# Patient Record
Sex: FEMALE | Race: BLACK OR AFRICAN AMERICAN | NOT HISPANIC OR LATINO | Employment: OTHER | URBAN - METROPOLITAN AREA
[De-identification: names, ages, dates, MRNs, and addresses within clinical notes are randomized per-mention and may not be internally consistent; named-entity substitution may affect disease eponyms.]

---

## 2017-01-11 ENCOUNTER — TELEPHONE (OUTPATIENT)
Dept: FAMILY MEDICINE | Facility: CLINIC | Age: 67
End: 2017-01-11

## 2017-01-11 NOTE — TELEPHONE ENCOUNTER
----- Message from Mya Cochran sent at 1/6/2017  4:04 PM CST -----  Contact: [phone] or [phone] /self   Patient called in returning your call. Please advise.

## 2017-01-19 ENCOUNTER — TELEPHONE (OUTPATIENT)
Dept: FAMILY MEDICINE | Facility: CLINIC | Age: 67
End: 2017-01-19

## 2017-01-19 NOTE — TELEPHONE ENCOUNTER
----- Message from Celine Osorio sent at 1/16/2017  9:10 AM CST -----  Patient no. [phone]    Patient wants to cancel the orthopedic appointment with [facility].  She would like to go to [facility] main campus.   Please call.

## 2017-08-03 ENCOUNTER — TELEPHONE (OUTPATIENT)
Dept: FAMILY MEDICINE | Facility: CLINIC | Age: 67
End: 2017-08-03

## 2017-08-03 NOTE — TELEPHONE ENCOUNTER
Attempted to return call.  I don't see documentation as to anyone trying to contact patient.  Phone rang multiple times then asked for voicemail code.  Could not leave message.

## 2017-08-03 NOTE — TELEPHONE ENCOUNTER
----- Message from Quita Parker sent at 8/3/2017  9:39 AM CDT -----  Contact: [phone]/ self   Patient called in returning your call. Please advise

## 2017-08-04 ENCOUNTER — OFFICE VISIT (OUTPATIENT)
Dept: FAMILY MEDICINE | Facility: CLINIC | Age: 67
End: 2017-08-04
Payer: COMMERCIAL

## 2017-08-04 VITALS
HEIGHT: 66 IN | OXYGEN SATURATION: 96 % | DIASTOLIC BLOOD PRESSURE: 76 MMHG | BODY MASS INDEX: 28.34 KG/M2 | HEART RATE: 76 BPM | WEIGHT: 176.38 LBS | SYSTOLIC BLOOD PRESSURE: 114 MMHG

## 2017-08-04 DIAGNOSIS — S86.912A STRAIN OF LEFT KNEE AND LEG, INITIAL ENCOUNTER: ICD-10-CM

## 2017-08-04 DIAGNOSIS — F41.9 ANXIETY: ICD-10-CM

## 2017-08-04 DIAGNOSIS — R41.3 MEMORY PROBLEM: ICD-10-CM

## 2017-08-04 DIAGNOSIS — M25.462 KNEE EFFUSION, LEFT: Primary | ICD-10-CM

## 2017-08-04 DIAGNOSIS — G89.29 CHRONIC PAIN OF LEFT KNEE: ICD-10-CM

## 2017-08-04 DIAGNOSIS — N64.4 BREAST PAIN, LEFT: ICD-10-CM

## 2017-08-04 DIAGNOSIS — M25.562 CHRONIC PAIN OF LEFT KNEE: ICD-10-CM

## 2017-08-04 PROCEDURE — 20610 DRAIN/INJ JOINT/BURSA W/O US: CPT | Mod: S$GLB,,, | Performed by: FAMILY MEDICINE

## 2017-08-04 PROCEDURE — 3008F BODY MASS INDEX DOCD: CPT | Mod: S$GLB,,, | Performed by: FAMILY MEDICINE

## 2017-08-04 PROCEDURE — 1126F AMNT PAIN NOTED NONE PRSNT: CPT | Mod: S$GLB,,, | Performed by: FAMILY MEDICINE

## 2017-08-04 PROCEDURE — 1159F MED LIST DOCD IN RCRD: CPT | Mod: S$GLB,,, | Performed by: FAMILY MEDICINE

## 2017-08-04 PROCEDURE — 99999 PR PBB SHADOW E&M-EST. PATIENT-LVL III: CPT | Mod: PBBFAC,,, | Performed by: FAMILY MEDICINE

## 2017-08-04 PROCEDURE — 99215 OFFICE O/P EST HI 40 MIN: CPT | Mod: 25,S$GLB,, | Performed by: FAMILY MEDICINE

## 2017-08-04 RX ORDER — MELOXICAM 7.5 MG/1
7.5 TABLET ORAL DAILY PRN
Qty: 30 TABLET | Refills: 2 | Status: SHIPPED | OUTPATIENT
Start: 2017-08-04 | End: 2018-12-11 | Stop reason: SDUPTHER

## 2017-08-04 RX ORDER — TRIAMCINOLONE ACETONIDE 40 MG/ML
40 INJECTION, SUSPENSION INTRA-ARTICULAR; INTRAMUSCULAR
Status: COMPLETED | OUTPATIENT
Start: 2017-08-04 | End: 2017-08-04

## 2017-08-04 RX ORDER — ALPRAZOLAM 0.5 MG/1
0.5 TABLET ORAL
Qty: 30 TABLET | Refills: 2 | Status: SHIPPED | OUTPATIENT
Start: 2017-08-04 | End: 2018-12-11 | Stop reason: SDUPTHER

## 2017-08-04 RX ADMIN — TRIAMCINOLONE ACETONIDE 40 MG: 40 INJECTION, SUSPENSION INTRA-ARTICULAR; INTRAMUSCULAR at 10:08

## 2017-08-04 NOTE — PROGRESS NOTES
(Portions of this note were dictated using voice recognition software and may contain dictation related errors in spelling/grammar/syntax not found on text review)    CC:   Chief Complaint   Patient presents with    Follow-up     leg swelling    Medication Refill     xanax and meloxicam       HPI: 67 y.o. female     Last visit in December for annual exam.  Was also seen at that time for left knee pain, was given meloxicam to take as needed.  MRI of the knee was done after continued problems showing full-thickness cartilage loss of patellar patella and medial compartment with some mild associated osseous edema.  Additionally there was a  tear of the mid to the left posterior medial meniscus along with a large joint effusion.  At that visit, aspiration was attempted and was unsuccessful despite several attempts.  She did have corticosteroid infiltrated intra-articularly at that time.  Had called back with continued swelling and no improvement.  Was referred to orthopedics in case surgical management is needed.    She currently states that after the above injection, the swelling did actually improve along with pain.  She is doing well until just a couple months ago.  Pain is not really a problem so much as the swelling has gotten worse.  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  Standing up for long period of time usually worsens the issue.  She does want a letter for her school stating that she may need to take sitting breaks every so often for 30 minutes or so when she standing up for long period of time if her leg starts bothering her.    She had visit with orthopedics who recommended surgery.  She is hesitant to do this right now because of her work schedule.  She would like to try to see if she can get another intra-articular injection since it did seem to help a bit last time.  She also states that meloxicam has helped when she is taking this in the past but only takes as  needed.  Would like a refill of this    Also states that periodically she gets left-sided breast pain.  Has been addressed in the past and had a diagnostic mammogram and ultrasound 2013, normal.  She is up-to-date on her screening mammograms.  She denies any lump or mass in the breast.  States that the pain is similar to the pain she had prior and its only once in a while but seems like maybe a gotten a little bit more frequent    Furthermore concerned about memory problems and forgetfulness.  She had some labs done in November showing normal thyroid and vitamin D status.  She has had prior vitamin B12 normality on labs.  She does endorse a lot of stresses and also lack of sleep probably from stresses as well    Also takes as needed alprazolam for anxiety, once a refill      Past Medical History:   Diagnosis Date    Anxiety     Chronic constipation     DDD (degenerative disc disease), lumbar     Fibroid tumor     Hyperplastic colon polyp     Osteopenia     Pes planus     Vitamin D imbalance        Past Surgical History:   Procedure Laterality Date    BREAST BIOPSY      left breast    Breast reduction  2004    TUBAL LIGATION         Family History   Problem Relation Age of Onset    Breast cancer Maternal Aunt     Colon cancer Cousin     Diabetes Sister      x2    Diabetes Mother     No Known Problems Father     No Known Problems Brother     No Known Problems Maternal Uncle     No Known Problems Paternal Aunt     No Known Problems Paternal Uncle     No Known Problems Maternal Grandmother     No Known Problems Maternal Grandfather     No Known Problems Paternal Grandmother     No Known Problems Paternal Grandfather     Amblyopia Neg Hx     Blindness Neg Hx     Cancer Neg Hx     Cataracts Neg Hx     Glaucoma Neg Hx     Hypertension Neg Hx     Macular degeneration Neg Hx     Retinal detachment Neg Hx     Strabismus Neg Hx     Stroke Neg Hx     Thyroid disease Neg Hx        Social  History     Social History    Marital status:      Spouse name: N/A    Number of children: N/A    Years of education: N/A     Occupational History    Not on file.     Social History Main Topics    Smoking status: Never Smoker    Smokeless tobacco: Not on file    Alcohol use No    Drug use: No    Sexual activity: Yes     Partners: Male     Birth control/ protection: Post-menopausal     Other Topics Concern    Not on file     Social History Narrative    No narrative on file     SCREENINGS     Immunizations:  tdap 2015  Flu: Up-to-date    pvx.  She had a pneumococcal vaccine at an outside pharmacy, not sure if this was Pneumovax or Prevnar.  She will have to check her records and get back to us.  Zoster 10/3/13     Age/Gender Appropriate screenings:  mmg 11/22/16  dexa 10/19/15 nml  Colonoscopy 7/31/13 1 polyp rt 5 yrs.    Lab Results   Component Value Date    WBC 6.22 11/08/2016    HGB 14.1 11/08/2016    HCT 41.6 11/08/2016     11/08/2016    CHOL 196 11/08/2016    TRIG 91 11/08/2016    HDL 69 11/08/2016    ALT 16 11/08/2016    AST 20 11/08/2016     11/08/2016    K 4.3 11/08/2016     11/08/2016    CREATININE 0.8 11/08/2016    BUN 14 11/08/2016    CO2 30 (H) 11/08/2016    TSH 0.808 11/08/2016    HGBA1C 5.8 07/26/2013    LDLCALC 108.8 11/08/2016    GLU 91 11/08/2016       ROS:  GENERAL: No fever, chills, fatigability or weight loss.  SKIN: No rashes, no itching.  HEAD: No headaches.  EYES: No visual changes  EARS: No ear pain or changes in hearing.  NOSE: No congestion or rhinorrhea.  MOUTH & THROAT: No hoarseness, change in voice, or sore throat.  NODES: Denies swollen glands.  CHEST: Denies HARPER, cyanosis, wheezing, cough and sputum production.  CARDIOVASCULAR: Denies chest pain, PND, orthopnea.  ABDOMEN: No nausea, vomiting, or changes in bowel function.  URINARY: No flank pain, dysuria or hematuria.  PERIPHERAL VASCULAR: No claudication or cyanosis.  MUSCULOSKELETAL:  Above  NEUROLOGIC: Above.  THE Above    Vital signs reviewed  PE:   APPEARANCE: Well nourished, well developed, in no acute distress.    HEAD: Normocephalic, atraumatic.  EYES  Conjunctivae noninjected.  BREASTS: Left breast exam: no mass or tenderness to palpation.  No axillary lymphadenopathy.  No architectural distortion  MSK: Left knee exam shows moderate effusion.  No joint line tenderness.  Mild patellar tendon tenderness.  No quadriceps tendon tenderness.  Positive patellar grind test.  Negative Lachman's and Marquita's testing.  No varus valgus stress pain or laxity    IMPRESSION  1. Knee effusion, left    2. Strain of left knee and leg, initial encounter    3. Chronic pain of left knee    4. Anxiety    5. Memory problem    6. Breast pain, left            PLAN  Reviewed prior office note and imaging as above.  Discussed repeat trial of corticosteroid intra-articularly since she found that this helps in the past.  Patient agrees.  PROCEDURE NOTE:After adequate cleansing of the overlying skin with alcohol, 5 cc total solution apprised of 4 cc of 2 percent plain lidocaine combined with 1 cc of 40 mg Kenalog injected into the left knee, lateral to the patellar tendon, without difficulty.  If the symptoms do not improve or continue to worsen, I would recommend following back up with orthopedics discuss interventional treatment    Refill meloxicam 7.5 mg once a day as needed for pain    Refill alprazolam as needed for anxiety    Left breast pain: No signs of concern on exam.  Her recent mammogram was normal as above.  She had a diagnostic mammogram and ultrasound 2013 for similar pain.  She states that the pain is intermittent.  We discussed continued conservative monitoring and keeping up on her regular mammograms versus repeat diagnostic mammography.  Patient states that she will continue to surveilled her symptoms and notify if she feels that symptoms are worsening to the point that a diagnostic mammogram would  be needed    This is a 40 minute visit, greater than 50% of the time involved with counseling

## 2017-08-04 NOTE — LETTER
August 4, 2017    Huber Pena  [address] [facility]  [place] [address]             [address]  [place] [address]  Phone: [phone]  Fax: [phone] To Whom It May Concern:    Huber Pena is under my medical care and being evaluated for left knee pain and swelling. I ask that she be allowed to sit for 30 minutes periodically through the day if needed for pain or swelling of the knee that worsens during standing.     If you have any questions or concerns, please don't hesitate to call.    Sincerely,        Ney Clayton MD

## 2017-10-17 ENCOUNTER — TELEPHONE (OUTPATIENT)
Dept: FAMILY MEDICINE | Facility: CLINIC | Age: 67
End: 2017-10-17

## 2017-10-17 DIAGNOSIS — Z00.00 ROUTINE GENERAL MEDICAL EXAMINATION AT A HEALTH CARE FACILITY: Primary | ICD-10-CM

## 2017-10-17 DIAGNOSIS — E55.9 VITAMIN D INSUFFICIENCY: ICD-10-CM

## 2017-10-17 DIAGNOSIS — Z12.31 ENCOUNTER FOR SCREENING MAMMOGRAM FOR BREAST CANCER: ICD-10-CM

## 2017-10-17 DIAGNOSIS — M85.80 OSTEOPENIA, UNSPECIFIED LOCATION: ICD-10-CM

## 2017-10-19 ENCOUNTER — PATIENT MESSAGE (OUTPATIENT)
Dept: FAMILY MEDICINE | Facility: CLINIC | Age: 67
End: 2017-10-19

## 2017-10-19 NOTE — TELEPHONE ENCOUNTER
Orders Placed This Encounter   Procedures    Mammo Digital Screening Bilat with CAD    CBC auto differential    Comprehensive metabolic panel    TSH    Vitamin D    Lipid panel

## 2017-10-23 ENCOUNTER — TELEPHONE (OUTPATIENT)
Dept: FAMILY MEDICINE | Facility: CLINIC | Age: 67
End: 2017-10-23

## 2017-10-23 NOTE — TELEPHONE ENCOUNTER
----- Message from Myra Spring sent at 10/21/2017  9:24 AM CDT -----  Contact: self, [phone]  Patient called in returning your call. Patient requests that you let the phone ring more than once so that she get to her phone before you hang up. Please advise.

## 2017-10-25 ENCOUNTER — TELEPHONE (OUTPATIENT)
Dept: FAMILY MEDICINE | Facility: CLINIC | Age: 67
End: 2017-10-25

## 2017-10-25 NOTE — TELEPHONE ENCOUNTER
----- Message from Na Clark sent at 10/24/2017  4:10 PM CDT -----  Contact: [phone]  Patient is returning  Your call regarding her appt

## 2017-10-25 NOTE — TELEPHONE ENCOUNTER
----- Message from Alessandra Gale sent at 10/25/2017  3:47 PM CDT -----  Contact: Self/ [phone]  Patient is returning your call.  Please advise.

## 2017-11-01 ENCOUNTER — TELEPHONE (OUTPATIENT)
Dept: FAMILY MEDICINE | Facility: CLINIC | Age: 67
End: 2017-11-01

## 2017-11-01 NOTE — TELEPHONE ENCOUNTER
Patient called inquiring about her colonoscopy. Bone density, gyn and labs.  Advised her that her colonoscopy is every ten years.  Patient states that she was told every 5 by her GI.  Advised to verify with GI but did see where her last colonoscopy was done in July of 2013.  If five years, patient not due until next year for colonoscopy.  Bone density is not due till next year also.  Patient could not remember the obgyn she had seen previously.  Advised that the last obgyn she saw was Dr Maury Sparks and her last visit was in December on 2016.  Did scheduled labs for 11/29 at 7:00, patient advised that she needs to fast.  Patient verbalized understanding.

## 2017-11-01 NOTE — TELEPHONE ENCOUNTER
----- Message from Alessandra Gale sent at 11/1/2017  8:29 AM CDT -----  Contact: Self/ [phone] or [phone]  Patient would like to schedule several appointments with you. 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 Please advise.

## 2017-11-14 ENCOUNTER — HOSPITAL ENCOUNTER (EMERGENCY)
Facility: HOSPITAL | Age: 67
Discharge: HOME | End: 2017-11-14
Attending: EMERGENCY MEDICINE
Payer: COMMERCIAL

## 2017-11-14 VITALS
BODY MASS INDEX: 28.28 KG/M2 | OXYGEN SATURATION: 100 % | HEIGHT: 66 IN | RESPIRATION RATE: 20 BRPM | TEMPERATURE: 98 F | WEIGHT: 176 LBS | SYSTOLIC BLOOD PRESSURE: 142 MMHG | HEART RATE: 78 BPM | DIASTOLIC BLOOD PRESSURE: 78 MMHG

## 2017-11-14 DIAGNOSIS — R51.9 HEADACHE: Primary | ICD-10-CM

## 2017-11-14 DIAGNOSIS — R51.9 ACUTE NONINTRACTABLE HEADACHE, UNSPECIFIED HEADACHE TYPE: ICD-10-CM

## 2017-11-14 LAB
ALBUMIN SERPL BCP-MCNC: 3.5 G/DL
ALP SERPL-CCNC: 79 U/L
ALT SERPL W/O P-5'-P-CCNC: 18 U/L
ANION GAP SERPL CALC-SCNC: 5 MMOL/L
AST SERPL-CCNC: 22 U/L
BASOPHILS # BLD AUTO: 0.03 K/UL
BASOPHILS NFR BLD: 0.6 %
BILIRUB SERPL-MCNC: 0.5 MG/DL
BUN SERPL-MCNC: 14 MG/DL
CALCIUM SERPL-MCNC: 9.9 MG/DL
CHLORIDE SERPL-SCNC: 108 MMOL/L
CO2 SERPL-SCNC: 24 MMOL/L
CREAT SERPL-MCNC: 0.8 MG/DL
DIFFERENTIAL METHOD: NORMAL
EOSINOPHIL # BLD AUTO: 0.2 K/UL
EOSINOPHIL NFR BLD: 3.1 %
ERYTHROCYTE [DISTWIDTH] IN BLOOD BY AUTOMATED COUNT: 13.7 %
ERYTHROCYTE [SEDIMENTATION RATE] IN BLOOD BY WESTERGREN METHOD: 18 MM/HR
EST. GFR  (AFRICAN AMERICAN): >60 ML/MIN/1.73 M^2
EST. GFR  (NON AFRICAN AMERICAN): >60 ML/MIN/1.73 M^2
GLUCOSE SERPL-MCNC: 87 MG/DL
HCT VFR BLD AUTO: 42.5 %
HGB BLD-MCNC: 14.3 G/DL
LYMPHOCYTES # BLD AUTO: 1.5 K/UL
LYMPHOCYTES NFR BLD: 31 %
MCH RBC QN AUTO: 31 PG
MCHC RBC AUTO-ENTMCNC: 33.6 G/DL
MCV RBC AUTO: 92 FL
MONOCYTES # BLD AUTO: 0.4 K/UL
MONOCYTES NFR BLD: 7.8 %
NEUTROPHILS # BLD AUTO: 2.7 K/UL
NEUTROPHILS NFR BLD: 57.5 %
PLATELET # BLD AUTO: NORMAL K/UL
PLATELET BLD QL SMEAR: NORMAL
PMV BLD AUTO: 10.9 FL
POTASSIUM SERPL-SCNC: 4.8 MMOL/L
PROT SERPL-MCNC: 7.5 G/DL
RBC # BLD AUTO: 4.61 M/UL
SODIUM SERPL-SCNC: 137 MMOL/L
WBC # BLD AUTO: 4.77 K/UL

## 2017-11-14 PROCEDURE — 85652 RBC SED RATE AUTOMATED: CPT

## 2017-11-14 PROCEDURE — 99284 EMERGENCY DEPT VISIT MOD MDM: CPT | Mod: 25

## 2017-11-14 PROCEDURE — 85025 COMPLETE CBC W/AUTO DIFF WBC: CPT

## 2017-11-14 PROCEDURE — 80053 COMPREHEN METABOLIC PANEL: CPT

## 2017-11-14 PROCEDURE — 63600175 PHARM REV CODE 636 W HCPCS: Performed by: EMERGENCY MEDICINE

## 2017-11-14 PROCEDURE — 96374 THER/PROPH/DIAG INJ IV PUSH: CPT

## 2017-11-14 PROCEDURE — 93005 ELECTROCARDIOGRAM TRACING: CPT

## 2017-11-14 PROCEDURE — 93010 ELECTROCARDIOGRAM REPORT: CPT | Mod: ,,, | Performed by: INTERNAL MEDICINE

## 2017-11-14 RX ORDER — BUTALBITAL, ACETAMINOPHEN AND CAFFEINE 50; 325; 40 MG/1; MG/1; MG/1
1 TABLET ORAL EVERY 6 HOURS PRN
Qty: 30 TABLET | Refills: 0 | Status: SHIPPED | OUTPATIENT
Start: 2017-11-14 | End: 2017-12-14

## 2017-11-14 RX ORDER — PROCHLORPERAZINE EDISYLATE 5 MG/ML
10 INJECTION INTRAMUSCULAR; INTRAVENOUS
Status: COMPLETED | OUTPATIENT
Start: 2017-11-14 | End: 2017-11-14

## 2017-11-14 RX ADMIN — PROCHLORPERAZINE EDISYLATE 10 MG: 5 INJECTION INTRAMUSCULAR; INTRAVENOUS at 08:11

## 2017-11-14 NOTE — ED NOTES
"Attempted to obtain IV x 2, both unsuccessful. Right ac site infiltrated with blood draw, site d/c with cath tip intact. While attempting to obtain iv site right hand, upon insertion pt yelled out jerking hand away from RN. Causing RN to lose  of iv cath. Pt states "that's too much excruciating pain." Cath tip intact, pressure dressing applied.  at bedside states "this is too painful for her." MD notified. Joy Bliss RN in to attempt access.  "

## 2017-11-14 NOTE — ED PROVIDER NOTES
Encounter Date: 11/14/2017       History     Chief Complaint   Patient presents with    Headache     Pt c/o headache to top of head that began around 5am, pain is now to right side of head and face. Pt states that she took 2 Tylenol at 5am with little relief.      CHIEF COMPLAINT: Patient presents with: headache    HISTORY OF PRESENT ILLNESS: Huber Pena who is a 67 y.o. presents to the emergency department today with complaint of headache. She started to have headache around 5 am. It was a throbbing pain at the top of her head, it has moved around to the font of the forehead and temple on the right. She has no increased pain with biting down or clenching her teeth. She's had no aura, no fever, no vision changes. No neck pain, no neck stiffness. No gait change, no unilateral sensation change, no unilateral weakness.    REVIEW OF SYSTEMS:  Constitutional: No fever, no chills.  Eyes: No discharge. No pain.  HENT: No nasal drainage. No ear ache. No sore throat.   Cardiovascular: No chest pain, no palpitations.  Respiratory: No cough, no shortness of breath.  Gastrointestinal: No abdominal pain, no vomiting. No diarrhea  Genitourinary: No hematuria, dysuria, urgency.  Musculoskeletal: No back pain.  Skin: No rashes, no lesions.  Neurological: No focal weakness.    Otherwise remaining ROS negative     ALLERGIES REVIEWED  MEDICATIONS REVIEWED  PMH/PSH/SOC/FH REVIEWED     The history is provided by the patient.    Nursing/Ancillary staff note reviewed.                  Review of patient's allergies indicates:  No Known Allergies  Past Medical History:   Diagnosis Date    Anxiety     Chronic constipation     DDD (degenerative disc disease), lumbar     Fibroid tumor     Hyperplastic colon polyp     Osteopenia     Pes planus     Vitamin D imbalance      Past Surgical History:   Procedure Laterality Date    BREAST BIOPSY      left breast    Breast reduction  2004    TUBAL LIGATION       Family History   Problem  Relation Age of Onset    Breast cancer Maternal Aunt     Colon cancer Cousin     Diabetes Sister      x2    Diabetes Mother     No Known Problems Father     No Known Problems Brother     No Known Problems Maternal Uncle     No Known Problems Paternal Aunt     No Known Problems Paternal Uncle     No Known Problems Maternal Grandmother     No Known Problems Maternal Grandfather     No Known Problems Paternal Grandmother     No Known Problems Paternal Grandfather     Amblyopia Neg Hx     Blindness Neg Hx     Cancer Neg Hx     Cataracts Neg Hx     Glaucoma Neg Hx     Hypertension Neg Hx     Macular degeneration Neg Hx     Retinal detachment Neg Hx     Strabismus Neg Hx     Stroke Neg Hx     Thyroid disease Neg Hx      Social History   Substance Use Topics    Smoking status: Never Smoker    Smokeless tobacco: Never Used    Alcohol use No     Review of Systems   All other systems reviewed and are negative.      Physical Exam     Initial Vitals [11/14/17 0641]   BP Pulse Resp Temp SpO2   136/68 (!) 55 20 97.8 °F (36.6 °C) 97 %      MAP       90.67         Physical Exam    Nursing note and vitals reviewed.  Constitutional: She appears well-developed and well-nourished.   HENT:   Head: Normocephalic and atraumatic.   Nose: Nose normal.   Mouth/Throat: Oropharynx is clear and moist.   Eyes: Conjunctivae and EOM are normal. Pupils are equal, round, and reactive to light. No scleral icterus.   Neck: Normal range of motion. Neck supple. No JVD present.   Cardiovascular: Normal rate, regular rhythm, normal heart sounds and intact distal pulses. Exam reveals no gallop and no friction rub.    No murmur heard.  Pulmonary/Chest: Breath sounds normal. No stridor. No respiratory distress. She has no wheezes. She exhibits no tenderness.   Abdominal: Soft. Bowel sounds are normal. She exhibits no distension and no mass. There is no tenderness. There is no rebound and no guarding.   Musculoskeletal: Normal range  of motion. She exhibits no edema or tenderness.   Neurological: She is alert and oriented to person, place, and time. She has normal strength. No cranial nerve deficit.   Alert and oriented x 4. CN II-XII grossly intact.  Sensation intact to light touch.  No focal weakness. Strength intact 5/5 bilaterally in upper and lower extremities. Normal gait. No nystagmus, normal head impulse testing, normal test of skew. Normal finger to nose. Normal rapid hand movements.  Normal Romberg.  No upper or lower extremity drift.     Skin: Skin is warm and dry. No rash noted. No pallor.   Psychiatric: She has a normal mood and affect. Thought content normal.         ED Course   Procedures  Labs Reviewed   CBC W/ AUTO DIFFERENTIAL   COMPREHENSIVE METABOLIC PANEL          X-Rays: Other Radiology Reports: Reviewed by myself, read by radiology.        Imaging Results          CT Head Without Contrast (Final result)  Result time 11/14/17 07:30:16    Final result by Flaquito Lheman MD (11/14/17 07:30:16)                 Impression:        No CT evidence of acute intracranial abnormality. If the patient's headaches are sufficiently clinically suspicious, or associated with signs of elevated ICP or focal neurologic deficits, further evaluation with MRI could be performed.      Electronically signed by: FLAQUITO LEHMAN  Date:     11/14/17  Time:    07:30              Narrative:    Procedure comments: CT examination of the head was performed from the skull base through the vertex without the use of intravenous contrast using 5-mm axial images.    Comparison: CT head 12/11/2003    Findings:    There is no evidence of acute intracranial hemorrhage, hydrocephalus, midline shift, or mass effect.Gray-white matter differentiation is maintained. There is a stable appearing focal region of hypoattenuation within the right thalamus likely representing a prominent perivascular space, unchanged from prior examination. The basal cisterns are patent.  No extra-axial collections are appreciated.The visualized paranasal sinuses and mastoid air cells are clear.The calvarium is intact.                              Medical Decision Making:   Initial Assessment:   Pt presents with HA, will evalate with CT scan and labs including ESR, treat and reassess.   Differential Diagnosis:   Migraine headache, cluster headache, tension headache eye strain, and infectious causes such as meningitis, pharyngitis and sinusitis, other dangerous causes such as subarachnoid hemorrhage, tumor      Clinical Tests:   Lab Tests: Ordered and Reviewed  Radiological Study: Ordered and Reviewed                   ED Course    0728 Pt does not wish to have her labs drawn at this time, has questions concerning her medicare coverage she would like answered prior to obtaining labs.    0900 patient is feeling much improved.  Headache resolved.  I discussed with her the results of the CT scan, CBC and CMP.  We are awaiting ESR.     1000 Pt feeling much improved. Discussed workup with pt. Her ESR is normal this is unlikely to be temporal arteritis, CT scan neg for any acute abnormality, no fevers  Unlikely to be infectious meningitis or encephalitis.  Will treat as vascular headache and have follow up with PCP, pt is comfortable with this plan.     After taking into careful account the historical factors and physical exam findings of the patient's presentation today, in conjunction with the empirical and objective data obtained on ED workup, no acute emergent medical condition has been identified. The patient appears to be low risk for an emergent medical condition and I feel it is safe and appropriate at this time for the patient to be discharged to follow-up as detailed in their discharge instructions for reevaluation and possible continued outpatient workup and management. I have discussed the specifics of the workup with the patient and the patient has verbalized understanding of the details of the  workup, the diagnosis, the treatment plan, and the need for outpatient follow-up.  Although the patient has no emergent etiology today this does not preclude the development of an emergent condition so in addition, I have advised the patient that they can return to the ED and/or activate EMS at any time with worsening of their symptoms, change of their symptoms, or with any other medical complaint.  The patient remained comfortable and stable during their visit in the ED.  Discharge and follow-up instructions discussed with the patient who expressed understanding and willingness to comply with my recommendations.     This medical record was prepared using voice dictation software. There may be phonetic errors.          Clinical Impression:   The primary encounter diagnosis was Headache. A diagnosis of Acute nonintractable headache, unspecified headache type was also pertinent to this visit.                           Gato Soto MD  12/12/17 0126

## 2017-11-14 NOTE — ED NOTES
"In room to educate pt on care plan, pt request to speak with someone from registration to see what her insurance would cover, because "someone has to pay for this." Registration called and notified would be in with pt as soon as possible. Pt states does not want anything done until she knows her visit is covered by insurance. MD notified.  "

## 2017-11-14 NOTE — ED NOTES
"Pt returned from CT scan, when RN asked if she wanted blood drawn and IV med and EKG per MD order pt states " I need my insurance questions answered first" charge nurse and MD notified   "

## 2017-11-27 ENCOUNTER — HOSPITAL ENCOUNTER (OUTPATIENT)
Dept: RADIOLOGY | Facility: HOSPITAL | Age: 67
Discharge: HOME | End: 2017-11-27
Attending: FAMILY MEDICINE
Payer: COMMERCIAL

## 2017-11-27 DIAGNOSIS — Z12.31 ENCOUNTER FOR SCREENING MAMMOGRAM FOR BREAST CANCER: ICD-10-CM

## 2017-11-27 PROCEDURE — 77067 SCR MAMMO BI INCL CAD: CPT | Mod: 26,,, | Performed by: RADIOLOGY

## 2017-11-27 PROCEDURE — 77067 SCR MAMMO BI INCL CAD: CPT | Mod: TC

## 2017-11-27 PROCEDURE — 77063 BREAST TOMOSYNTHESIS BI: CPT | Mod: 26,,, | Performed by: RADIOLOGY

## 2017-11-29 ENCOUNTER — LAB VISIT (OUTPATIENT)
Dept: LAB | Facility: HOSPITAL | Age: 67
End: 2017-11-29
Attending: FAMILY MEDICINE
Payer: COMMERCIAL

## 2017-11-29 DIAGNOSIS — M85.80 OSTEOPENIA, UNSPECIFIED LOCATION: ICD-10-CM

## 2017-11-29 DIAGNOSIS — Z00.00 ROUTINE GENERAL MEDICAL EXAMINATION AT A HEALTH CARE FACILITY: ICD-10-CM

## 2017-11-29 DIAGNOSIS — E55.9 VITAMIN D INSUFFICIENCY: ICD-10-CM

## 2017-11-29 LAB
25(OH)D3+25(OH)D2 SERPL-MCNC: 31 NG/ML
ALBUMIN SERPL BCP-MCNC: 3.5 G/DL
ALP SERPL-CCNC: 76 U/L
ALT SERPL W/O P-5'-P-CCNC: 15 U/L
ANION GAP SERPL CALC-SCNC: 7 MMOL/L
AST SERPL-CCNC: 18 U/L
BASOPHILS # BLD AUTO: 0.02 K/UL
BASOPHILS NFR BLD: 0.4 %
BILIRUB SERPL-MCNC: 0.7 MG/DL
BUN SERPL-MCNC: 14 MG/DL
CALCIUM SERPL-MCNC: 9.2 MG/DL
CHLORIDE SERPL-SCNC: 108 MMOL/L
CHOLEST SERPL-MCNC: 202 MG/DL
CHOLEST/HDLC SERPL: 2.9 {RATIO}
CO2 SERPL-SCNC: 26 MMOL/L
CREAT SERPL-MCNC: 0.8 MG/DL
DIFFERENTIAL METHOD: ABNORMAL
EOSINOPHIL # BLD AUTO: 0.1 K/UL
EOSINOPHIL NFR BLD: 2.2 %
ERYTHROCYTE [DISTWIDTH] IN BLOOD BY AUTOMATED COUNT: 13.7 %
EST. GFR  (AFRICAN AMERICAN): >60 ML/MIN/1.73 M^2
EST. GFR  (NON AFRICAN AMERICAN): >60 ML/MIN/1.73 M^2
GLUCOSE SERPL-MCNC: 100 MG/DL
HCT VFR BLD AUTO: 40.6 %
HDLC SERPL-MCNC: 70 MG/DL
HDLC SERPL: 34.7 %
HGB BLD-MCNC: 13.7 G/DL
LDLC SERPL CALC-MCNC: 122 MG/DL
LYMPHOCYTES # BLD AUTO: 1.6 K/UL
LYMPHOCYTES NFR BLD: 31.8 %
MCH RBC QN AUTO: 31.1 PG
MCHC RBC AUTO-ENTMCNC: 33.7 G/DL
MCV RBC AUTO: 92 FL
MONOCYTES # BLD AUTO: 0.5 K/UL
MONOCYTES NFR BLD: 9.9 %
NEUTROPHILS # BLD AUTO: 2.8 K/UL
NEUTROPHILS NFR BLD: 55.5 %
NONHDLC SERPL-MCNC: 132 MG/DL
PLATELET # BLD AUTO: 222 K/UL
PMV BLD AUTO: 10.6 FL
POTASSIUM SERPL-SCNC: 4 MMOL/L
PROT SERPL-MCNC: 7.5 G/DL
RBC # BLD AUTO: 4.41 M/UL
SODIUM SERPL-SCNC: 141 MMOL/L
TRIGL SERPL-MCNC: 50 MG/DL
TSH SERPL DL<=0.005 MIU/L-ACNC: 0.71 UIU/ML
WBC # BLD AUTO: 5.06 K/UL

## 2017-11-29 PROCEDURE — 84443 ASSAY THYROID STIM HORMONE: CPT

## 2017-11-29 PROCEDURE — 85025 COMPLETE CBC W/AUTO DIFF WBC: CPT

## 2017-11-29 PROCEDURE — 36415 COLL VENOUS BLD VENIPUNCTURE: CPT

## 2017-11-29 PROCEDURE — 80061 LIPID PANEL: CPT

## 2017-11-29 PROCEDURE — 80053 COMPREHEN METABOLIC PANEL: CPT

## 2017-11-29 PROCEDURE — 82306 VITAMIN D 25 HYDROXY: CPT

## 2017-12-06 ENCOUNTER — OFFICE VISIT (OUTPATIENT)
Dept: FAMILY MEDICINE | Facility: CLINIC | Age: 67
End: 2017-12-06
Payer: COMMERCIAL

## 2017-12-06 VITALS
SYSTOLIC BLOOD PRESSURE: 124 MMHG | BODY MASS INDEX: 27.99 KG/M2 | HEIGHT: 66 IN | DIASTOLIC BLOOD PRESSURE: 72 MMHG | OXYGEN SATURATION: 97 % | HEART RATE: 78 BPM | WEIGHT: 174.19 LBS

## 2017-12-06 DIAGNOSIS — R41.3 MEMORY PROBLEM: ICD-10-CM

## 2017-12-06 DIAGNOSIS — Z00.00 ROUTINE GENERAL MEDICAL EXAMINATION AT A HEALTH CARE FACILITY: Primary | ICD-10-CM

## 2017-12-06 DIAGNOSIS — M85.80 OSTEOPENIA, UNSPECIFIED LOCATION: ICD-10-CM

## 2017-12-06 DIAGNOSIS — M89.9 BONE DISORDER: ICD-10-CM

## 2017-12-06 DIAGNOSIS — M25.562 CHRONIC PAIN OF LEFT KNEE: ICD-10-CM

## 2017-12-06 DIAGNOSIS — S83.242A OTHER TEAR OF MEDIAL MENISCUS OF LEFT KNEE AS CURRENT INJURY, INITIAL ENCOUNTER: ICD-10-CM

## 2017-12-06 DIAGNOSIS — F41.9 ANXIETY: ICD-10-CM

## 2017-12-06 DIAGNOSIS — G89.29 CHRONIC PAIN OF LEFT KNEE: ICD-10-CM

## 2017-12-06 PROCEDURE — 99397 PER PM REEVAL EST PAT 65+ YR: CPT | Mod: S$GLB,,, | Performed by: FAMILY MEDICINE

## 2017-12-06 PROCEDURE — 99999 PR PBB SHADOW E&M-EST. PATIENT-LVL III: CPT | Mod: PBBFAC,,, | Performed by: FAMILY MEDICINE

## 2017-12-06 RX ORDER — LATANOPROST 50 UG/ML
SOLUTION/ DROPS OPHTHALMIC
Refills: 4 | COMMUNITY
Start: 2017-11-09 | End: 2018-08-15 | Stop reason: CLARIF

## 2017-12-06 RX ORDER — DORZOLAMIDE HYDROCHLORIDE AND TIMOLOL MALEATE 20; 5 MG/ML; MG/ML
SOLUTION/ DROPS OPHTHALMIC
Refills: 5 | COMMUNITY
Start: 2017-11-11 | End: 2018-12-11

## 2017-12-06 NOTE — PROGRESS NOTES
(Portions of this note were dictated using voice recognition software and may contain dictation related errors in spelling/grammar/syntax not found on text review)    CC:   Chief Complaint   Patient presents with    Annual Exam    Results     Labs    Memory Loss    Knee Pain     Left Knee       HPI: 67 y.o. female here for annual exam.  Medical history below    Xanax occasionally if needed for anxiety    Osteopenia history although last bone density in 2015 was normal    Recent labs below, overall within normal limits    At last visit in August was having issues with knee pains.  Knee effusion.  MRI had shown full-thickness cartilage loss of patellar and medial compartment along with posterior medial meniscus tear.  Had visit with orthopedics recommended surgery but she was hesitant proceed at that time.  I had given her intra-articular steroid shot at that visit and recommended follow-up with orthopedist if this does not improve her symptoms.  She was provided meloxicam as needed for pain.  Rarely takes this.  She feels like her knee pain is getting worse however.  She has not visited back with orthopedics but plans to do so after this appointment    Also had reported some memory concerns and forgetfulness.  Normal labs have been done including vitamin D and B12 in the past.  Had endorsed a lot of stresses and lack of sleep probably for stresses well and we discussed that this might have a significant impact on overall memory and cognitive functioning.  She does still admit this is likely the case.  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  Denies significant memory lapses with very important anxiety but does feel that perhaps she is getting overwhelmed.  Her daughter and recommended that she keep a calendar and keep her tasks in this calendar to remind her what to do.        Past  Medical History:   Diagnosis Date    Anxiety     Chronic constipation     DDD (degenerative disc disease), lumbar     Fibroid tumor     Hyperplastic colon polyp     Osteopenia     Pes planus     Vitamin D imbalance        Past Surgical History:   Procedure Laterality Date    BREAST BIOPSY      left breast    BREAST RECONSTRUCTION      Breast reduction  2004    TUBAL LIGATION         Family History   Problem Relation Age of Onset    Breast cancer Maternal Aunt     Colon cancer Cousin     Diabetes Sister      x2    Diabetes Mother     No Known Problems Father     No Known Problems Brother     No Known Problems Maternal Uncle     No Known Problems Paternal Aunt     No Known Problems Paternal Uncle     No Known Problems Maternal Grandmother     No Known Problems Maternal Grandfather     No Known Problems Paternal Grandmother     No Known Problems Paternal Grandfather     Amblyopia Neg Hx     Blindness Neg Hx     Cancer Neg Hx     Cataracts Neg Hx     Glaucoma Neg Hx     Hypertension Neg Hx     Macular degeneration Neg Hx     Retinal detachment Neg Hx     Strabismus Neg Hx     Stroke Neg Hx     Thyroid disease Neg Hx        Social History     Social History    Marital status:      Spouse name: N/A    Number of children: N/A    Years of education: N/A     Occupational History    Not on file.     Social History Main Topics    Smoking status: Never Smoker    Smokeless tobacco: Never Used    Alcohol use No    Drug use: No    Sexual activity: Yes     Partners: Male     Birth control/ protection: Post-menopausal     Other Topics Concern    Not on file     Social History Narrative    No narrative on file       SCREENINGS     Immunizations:  tdap 2015  Flu:  utd   pvx.  She had a pneumococcal vaccine at an outside pharmacy, not sure if this was Pneumovax or Prevnar.  She will have to check her records and get back to us.  Zoster 10/3/13     Age/Gender Appropriate  screenings:  mmg 11/27/17  dexa 10/19/15 nml  Colonoscopy 7/31/13 1 polyp rt 5 yrs.       Lab Results   Component Value Date    WBC 5.06 11/29/2017    HGB 13.7 11/29/2017    HCT 40.6 11/29/2017     11/29/2017    CHOL 202 (H) 11/29/2017    TRIG 50 11/29/2017    HDL 70 11/29/2017    ALT 15 11/29/2017    AST 18 11/29/2017     11/29/2017    K 4.0 11/29/2017     11/29/2017    CREATININE 0.8 11/29/2017    CALCIUM 9.2 11/29/2017    BUN 14 11/29/2017    CO2 26 11/29/2017    TSH 0.708 11/29/2017    HGBA1C 5.8 07/26/2013    LDLCALC 122.0 11/29/2017     11/29/2017                   Vital signs reviewed  PE:   APPEARANCE: Well nourished, well developed, in no acute distress.    HEAD: Normocephalic, atraumatic.  EYES: PERRL. EOMI.   Conjunctivae noninjected.  EARS: TM's intact. Light reflex normal. No retraction or perforation.    NOSE: Mucosa pink. Airway clear.  MOUTH & THROAT: No tonsillar enlargement. No pharyngeal erythema or exudate.   NECK: Supple with no cervical lymphadenopathy.  No carotid bruits.  No thyromegaly  CHEST: Good inspiratory effort. Lungs clear to auscultation with no wheezes or crackles.  CARDIOVASCULAR: Normal S1, S2. No rubs, murmurs, or gallops.  ABDOMEN: Bowel sounds normal. Not distended. Soft. No tenderness or masses. No organomegaly.  EXTREMITIES: No edema, cyanosis, or clubbing.  NEURO: mini cog normal recall and clock face.     IMPRESSION  1. Routine general medical examination at a health care facility    2. Osteopenia, unspecified location    3. Bone disorder    4. Other tear of medial meniscus of left knee as current injury, initial encounter    5. Chronic pain of left knee    6. Memory problem    7. Anxiety        PLAN  Orders Placed This Encounter   Procedures    DXA Bone Density Spine And Hip     Reviewed recent labs as above, within normal range    Recommend follow-up with orthopedics given continued pain in the knee and pathology as noted above.  Was recommended  that she will likely need knee surgery.    Memory problem: Seems to be related to her acute stressors.  She is certainly advised to keep a calendar like her daughter mentioned and to keep her tasks written down.  She does admit that up until now, she is tried to just remember everything that she needs to do without having to write it down.  However, she admits being overwhelmed with multiple tasks and other family stressors as documented above.  No bizarre behavior or getting lost or wandering.  Mini cog test normal.  We will continue to monitor.  If symptoms worsen, can readdress    DEXA to be up dated

## 2017-12-11 NOTE — TELEPHONE ENCOUNTER
I ran into the patient yesterday while Be shopping.  She and I had a long talk.  I know she had tried to call and left a message some time ago, but she really just wanted to know how I was doing.

## 2017-12-18 ENCOUNTER — PATIENT MESSAGE (OUTPATIENT)
Dept: ADMINISTRATIVE | Facility: OTHER | Age: 67
End: 2017-12-18

## 2017-12-19 ENCOUNTER — OFFICE VISIT (OUTPATIENT)
Dept: INTERNAL MEDICINE | Facility: CLINIC | Age: 67
End: 2017-12-19
Payer: MEDICARE

## 2017-12-19 VITALS
HEIGHT: 66 IN | OXYGEN SATURATION: 98 % | DIASTOLIC BLOOD PRESSURE: 80 MMHG | WEIGHT: 174 LBS | BODY MASS INDEX: 27.97 KG/M2 | SYSTOLIC BLOOD PRESSURE: 126 MMHG | HEART RATE: 70 BPM | TEMPERATURE: 98 F

## 2017-12-19 DIAGNOSIS — Z20.828 EXPOSURE TO THE FLU: ICD-10-CM

## 2017-12-19 DIAGNOSIS — J06.9 UPPER RESPIRATORY TRACT INFECTION, UNSPECIFIED TYPE: Primary | ICD-10-CM

## 2017-12-19 PROCEDURE — 99213 OFFICE O/P EST LOW 20 MIN: CPT | Mod: S$GLB,,, | Performed by: INTERNAL MEDICINE

## 2017-12-19 PROCEDURE — 99999 PR PBB SHADOW E&M-EST. PATIENT-LVL III: CPT | Mod: PBBFAC,,, | Performed by: INTERNAL MEDICINE

## 2017-12-19 RX ORDER — METHYLPREDNISOLONE 4 MG/1
TABLET ORAL
Qty: 1 PACKAGE | Refills: 0 | Status: SHIPPED | OUTPATIENT
Start: 2017-12-19 | End: 2018-08-15

## 2017-12-19 RX ORDER — CODEINE PHOSPHATE AND GUAIFENESIN 10; 100 MG/5ML; MG/5ML
5 SOLUTION ORAL 3 TIMES DAILY PRN
Qty: 180 ML | Refills: 0 | Status: SHIPPED | OUTPATIENT
Start: 2017-12-19 | End: 2017-12-29

## 2017-12-19 RX ORDER — OSELTAMIVIR PHOSPHATE 75 MG/1
75 CAPSULE ORAL 2 TIMES DAILY
Qty: 10 CAPSULE | Refills: 0 | Status: SHIPPED | OUTPATIENT
Start: 2017-12-19 | End: 2017-12-24

## 2017-12-19 NOTE — PROGRESS NOTES
Subjective:       Patient ID: Huber Pena is a 67 y.o. female.    Chief Complaint: Sinusitis ( tested + influ A today); Sore Throat; and Cough    HPI  Pt with 1 day of HA, nonprod cough, coryza, but no F/C, SOB.   has documented influenza A.  Review of Systems    Objective:      Physical Exam   Constitutional: She appears well-developed. No distress.   HENT:   Head: Normocephalic.   Mouth/Throat: Oropharynx is clear and moist.   Sinuses nontender   Eyes: EOM are normal.   Neck: Normal range of motion. No tracheal deviation present.   Cardiovascular: Normal rate, regular rhythm and intact distal pulses.    Pulmonary/Chest: Effort normal and breath sounds normal. No respiratory distress.   Abdominal: She exhibits no distension.   Musculoskeletal: Normal range of motion. She exhibits no edema.   Lymphadenopathy:     She has no cervical adenopathy.   Neurological: She is alert. No cranial nerve deficit. She exhibits normal muscle tone. Coordination normal.   Skin: Skin is warm and dry. No rash noted. She is not diaphoretic. No erythema.   Psychiatric: She has a normal mood and affect. Her behavior is normal.   Vitals reviewed.      Assessment:       1. Upper respiratory tract infection, unspecified type    2. Exposure to the flu        Plan:       Huber was seen today for sinusitis, sore throat and cough.    Diagnoses and all orders for this visit:    Upper respiratory tract infection, unspecified type  -     methylPREDNISolone (MEDROL DOSEPACK) 4 mg tablet; use as directed  -     guaifenesin-codeine 100-10 mg/5 ml (CHERATUSSIN AC)  mg/5 mL syrup; Take 5 mLs by mouth 3 (three) times daily as needed for Cough.    Exposure to the flu  -     oseltamivir (TAMIFLU) 75 MG capsule; Take 1 capsule (75 mg total) by mouth 2 (two) times daily.      Return if symptoms worsen or fail to improve.

## 2017-12-29 ENCOUNTER — OFFICE VISIT (OUTPATIENT)
Dept: OBSTETRICS AND GYNECOLOGY | Facility: CLINIC | Age: 67
End: 2017-12-29
Payer: COMMERCIAL

## 2017-12-29 VITALS
SYSTOLIC BLOOD PRESSURE: 134 MMHG | DIASTOLIC BLOOD PRESSURE: 78 MMHG | WEIGHT: 173.06 LBS | BODY MASS INDEX: 27.93 KG/M2

## 2017-12-29 DIAGNOSIS — Z01.419 ENCOUNTER FOR GYNECOLOGICAL EXAMINATION WITHOUT ABNORMAL FINDING: Primary | ICD-10-CM

## 2017-12-29 PROCEDURE — 99999 PR PBB SHADOW E&M-EST. PATIENT-LVL III: CPT | Mod: PBBFAC,,, | Performed by: OBSTETRICS & GYNECOLOGY

## 2017-12-29 PROCEDURE — 99397 PER PM REEVAL EST PAT 65+ YR: CPT | Mod: S$GLB,,, | Performed by: OBSTETRICS & GYNECOLOGY

## 2017-12-30 NOTE — PROGRESS NOTES
GYNECOLOGY  :  Huber Pena is a 67 y.o.  Here today for routine gynecological examination  No complaints  At this time    Last PAP 1 year ago  Last mammogram  2016  normal       ROS  GENERAL: Denies significant weight gain or weight loss. Feeling well overall.  SKIN: Denies rash or lesions.  Normal skin turgor  HEAD: Denies head injury or headache.   NODES: Denies enlarged lymph nodes.   CHEST: Denies chest pain or shortness of breath.   CARDIOVASCULAR: Denies palpitations or left sided chest pain.   ABDOMEN: No abdominal pain,no  diarrhea,constipation  nausea, vomiting or rectal bleeding.   URINARY: normal  Frequency,no  Dysuria or burning on urination.   REPRODUCTIVE: No abnormal bleeding.No vaginal discharge.   BREASTS: The patient sometimes performs breast self-examination and denies pain, lumps, or nipple discharge.   HEMATOLOGIC: No easy bruisability or excessive bleeding.   MUSCULOSKELETAL: Denies joint pain or swelling.   NEUROLOGIC: Denies syncope or weakness.   PSYCHIATRIC: Denies depression, anxiety or mood swings.    Physical Exam   Constitutional: She is oriented to person, place, and time. She appears well-developed and well-nourished. No distress.   HENT:   Head: Normocephalic.   NECK: Neck symmetric without masses or thyromegaly.  Cardiovascular: Normal rate and regular rhythm.   Pulmonary/Chest: Effort normal and breath sounds normal. No respiratory distress. She has no wheezes.   Breasts: Symmetrical, no skin changes or visible lesions. No palpable masses, nipple discharge or adenopathy bilaterally.  Abdominal: Soft not distended. Bowel sounds are normal. She exhibits   no masses . No tenderness to palpation.   Genitourinary: Pelvic exam was performed with patient supine.   External genitalia normal no lesions.Normal hair distribution   Adequate perineal body,normal urethral meatus   Vagina moist and well rugated without lesions, no vaginal  Discharge.   Cervix pink and without  lesions. No bleeding. No significant cystocele or rectocele.  Bimanual exam difficult due to body habitus    Urethra and bladder normal  Extremities normal no cyanosis ,edema.     Procedures:  Pap smear    A/P Hbuer Pena 67 y.o.     Dx=  1-Routine gynecological examination   2-Menopause  3-Osteopenia    Plan:  Routine gyn   -s/p normal breast exam   -s/p normal pelvic exam:    Patient was counseled today on A.C.S. Pap guidelines and recommendations for yearly pelvic exams, mammograms and monthly self breast exams; to see her PCP for other health maintenance, nutrition and weight gain counseling, discussed lab values.  Discussed colonoscopy recommendations every 10 years starting at age 50   Calcium and vit D daily intake     F/u in 1 yr or JESSICAN    Dane Sparks M.D.   OB/GYN

## 2018-01-24 ENCOUNTER — TELEPHONE (OUTPATIENT)
Dept: FAMILY MEDICINE | Facility: CLINIC | Age: 68
End: 2018-01-24

## 2018-01-24 RX ORDER — OSELTAMIVIR PHOSPHATE 75 MG/1
75 CAPSULE ORAL 2 TIMES DAILY
Qty: 10 CAPSULE | Refills: 0 | Status: SHIPPED | OUTPATIENT
Start: 2018-01-24 | End: 2018-01-29

## 2018-01-24 NOTE — TELEPHONE ENCOUNTER
----- Message from Myra Spring sent at 1/23/2018  5:05 PM CST -----  Contact: self, [phone]  Patient states she has flu symptoms, 99.7, does not feel well, has headaches, chills. Requests medication prescribed.

## 2018-05-12 ENCOUNTER — OFFICE VISIT (OUTPATIENT)
Dept: FAMILY MEDICINE | Facility: CLINIC | Age: 68
End: 2018-05-12
Payer: COMMERCIAL

## 2018-05-12 VITALS
DIASTOLIC BLOOD PRESSURE: 60 MMHG | SYSTOLIC BLOOD PRESSURE: 112 MMHG | HEART RATE: 68 BPM | TEMPERATURE: 98 F | OXYGEN SATURATION: 98 % | HEIGHT: 66 IN

## 2018-05-12 DIAGNOSIS — R42 VERTIGO: ICD-10-CM

## 2018-05-12 DIAGNOSIS — J30.89 NON-SEASONAL ALLERGIC RHINITIS, UNSPECIFIED TRIGGER: ICD-10-CM

## 2018-05-12 DIAGNOSIS — H61.23 BILATERAL IMPACTED CERUMEN: Primary | ICD-10-CM

## 2018-05-12 PROCEDURE — 99214 OFFICE O/P EST MOD 30 MIN: CPT | Mod: S$GLB,,, | Performed by: FAMILY MEDICINE

## 2018-05-12 PROCEDURE — 99999 PR PBB SHADOW E&M-EST. PATIENT-LVL III: CPT | Mod: PBBFAC,,, | Performed by: FAMILY MEDICINE

## 2018-05-12 RX ORDER — MECLIZINE HYDROCHLORIDE 25 MG/1
25 TABLET ORAL 3 TIMES DAILY PRN
Qty: 30 TABLET | Refills: 2 | Status: SHIPPED | OUTPATIENT
Start: 2018-05-12 | End: 2022-08-09

## 2018-05-12 NOTE — PATIENT INSTRUCTIONS
Continue Zyrtec and Flonase every day with Meclezine as needed for vertigo. Continue bilateral home ear wax remove-- DEBROX wax removal with drops and bulb syring. Follow up if worsening.

## 2018-05-12 NOTE — PROGRESS NOTES
Office Visit    Patient Name: Huber Pena    : 1950  MRN: [id number]    Subjective:  Huber is a 67 y.o. female who presents today for:    Sinusitis    68 yo patient of Dr Clayton's here for evaluation of sinus symptoms since . No pertinent PMH.  Was seen by Dr Guillen in 2017 for URI symptoms and prescribed Medrol Dosepack, but has been feeling well until about 1 week ago when she started experiencing clogging and pressure in both ears with some whooshing  noises bilaterally. Feels like her equilibrium is off but room is not spinning. She has a history of vertigo and allergic rhinitis that have presented similarly to this and she has been taking some zyrtec and flonase off and on recently. +Post nasal drip but no severe sore throat, no cough, and no fevers, chills.  +eye redness and irritation.          Past Medical History  Past Medical History:   Diagnosis Date    Anxiety     Chronic constipation     DDD (degenerative disc disease), lumbar     Fibroid tumor     Hyperplastic colon polyp     Osteopenia     Pes planus        Past Surgical History  Past Surgical History:   Procedure Laterality Date    BREAST BIOPSY      left breast    BREAST RECONSTRUCTION      Breast reduction  2004    TUBAL LIGATION         Family History  Family History   Problem Relation Age of Onset    Breast cancer Maternal Aunt     Colon cancer Cousin     Diabetes Sister         x2    Diabetes Mother     No Known Problems Father     No Known Problems Brother     No Known Problems Maternal Uncle     No Known Problems Paternal Aunt     No Known Problems Paternal Uncle     No Known Problems Maternal Grandmother     No Known Problems Maternal Grandfather     No Known Problems Paternal Grandmother     No Known Problems Paternal Grandfather     Amblyopia Neg Hx     Blindness Neg Hx     Cancer Neg Hx     Cataracts Neg Hx     Glaucoma Neg Hx     Hypertension Neg Hx     Macular degeneration Neg Hx     Retinal  "detachment Neg Hx     Strabismus Neg Hx     Stroke Neg Hx     Thyroid disease Neg Hx        Social History  Social History     Social History    Marital status:      Spouse name: N/A    Number of children: N/A    Years of education: N/A     Occupational History    Not on file.     Social History Main Topics    Smoking status: Never Smoker    Smokeless tobacco: Never Used    Alcohol use No    Drug use: No    Sexual activity: Yes     Partners: Male     Birth control/ protection: Post-menopausal     Other Topics Concern    Not on file     Social History Narrative    No narrative on file       Current Medications  Medications reviewed and updated.     Allergies   Review of patient's allergies indicates:  No Known Allergies    Review of Systems (Pertinent positives)  Review of Systems   Constitutional: Negative for chills and fever.   HENT: Positive for ear pain and hearing loss.    Gastrointestinal: Negative for nausea and vomiting.   Neurological: Positive for dizziness.       /60 (BP Location: Right arm, Patient Position: Sitting, BP Method: Medium (Manual))   Pulse 68   Temp 97.8 °F (36.6 °C) (Oral)   Ht 5' 6" (1.676 m)   LMP  (LMP Unknown)   SpO2 98%     Physical Exam   Constitutional: She is oriented to person, place, and time. She appears well-developed and well-nourished. No distress.   HENT:   Head: Normocephalic and atraumatic.   Nose: Rhinorrhea present.   Mouth/Throat: No oropharyngeal exudate or posterior oropharyngeal erythema.   Bilateral cerumen impaction at least partially relieved bilaterally with curette.  Patient reports being able to hear out of both ears now.    Eyes: Conjunctivae are normal.   Cardiovascular: Normal rate and regular rhythm.    Pulmonary/Chest: Effort normal and breath sounds normal.   Musculoskeletal: She exhibits no edema.   Neurological: She is alert and oriented to person, place, and time.   Skin: Skin is warm and dry.   Psychiatric: She has a " normal mood and affect.   Vitals reviewed.        Assessment/Plan:  Huber Pena is a 67 y.o. female who presents today for :    Huber was seen today for sinusitis.    Diagnoses and all orders for this visit:    Bilateral impacted cerumen  Comments:  partially relieved with curette and advise to continue home ear wax removal kit. patient can now hear out of both ears    Non-seasonal allergic rhinitis, unspecified trigger  -     meclizine (ANTIVERT) 25 mg tablet; Take 1 tablet (25 mg total) by mouth 3 (three) times daily as needed for Dizziness or Nausea.    Vertigo  Comments:  no new concerning neurologic symptoms, and patient reports this presentation similar to previous. responds well to meclezine prn & allergy control  Orders:  -     meclizine (ANTIVERT) 25 mg tablet; Take 1 tablet (25 mg total) by mouth 3 (three) times daily as needed for Dizziness or Nausea.            ICD-10-CM ICD-9-CM    1. Bilateral impacted cerumen H61.23 380.4     partially relieved with curette and advise to continue home ear wax removal kit. patient can now hear out of both ears   2. Non-seasonal allergic rhinitis, unspecified trigger J30.89 477.8 meclizine (ANTIVERT) 25 mg tablet   3. Vertigo R42 780.4 meclizine (ANTIVERT) 25 mg tablet    no new concerning neurologic symptoms, and patient reports this presentation similar to previous. responds well to meclezine prn & allergy control       Patient Instructions   Continue Zyrtec and Flonase every day with Meclezine as needed for vertigo. Continue bilateral home ear wax remove-- DEBROX wax removal with drops and bulb syring. Follow up if worsening.         Follow-up for return as needed for new concerns.

## 2018-06-08 DIAGNOSIS — Z11.59 NEED FOR HEPATITIS C SCREENING TEST: ICD-10-CM

## 2018-06-26 ENCOUNTER — OFFICE VISIT (OUTPATIENT)
Dept: PODIATRY | Facility: CLINIC | Age: 68
End: 2018-06-26
Payer: COMMERCIAL

## 2018-06-26 ENCOUNTER — TELEPHONE (OUTPATIENT)
Dept: PODIATRY | Facility: CLINIC | Age: 68
End: 2018-06-26

## 2018-06-26 VITALS
HEART RATE: 67 BPM | SYSTOLIC BLOOD PRESSURE: 126 MMHG | WEIGHT: 178 LBS | DIASTOLIC BLOOD PRESSURE: 82 MMHG | BODY MASS INDEX: 28.61 KG/M2 | HEIGHT: 66 IN

## 2018-06-26 DIAGNOSIS — M25.50 ARTHRALGIA, UNSPECIFIED JOINT: ICD-10-CM

## 2018-06-26 DIAGNOSIS — M21.41 FLAT FOOT, ACQUIRED, RIGHT: ICD-10-CM

## 2018-06-26 DIAGNOSIS — M21.611 BUNION OF GREAT TOE OF RIGHT FOOT: Primary | ICD-10-CM

## 2018-06-26 PROCEDURE — 99203 OFFICE O/P NEW LOW 30 MIN: CPT | Mod: S$GLB,,, | Performed by: PODIATRIST

## 2018-06-26 PROCEDURE — 99999 PR PBB SHADOW E&M-EST. PATIENT-LVL III: CPT | Mod: PBBFAC,,, | Performed by: PODIATRIST

## 2018-06-26 NOTE — PROGRESS NOTES
Subjective:      Patient ID: Huber Pena is a 68 y.o. female.    Chief Complaint: Foot Problem (right ft./ PCP Dr. Clayton 12/6/17) and Foot Pain    Huber is a 68 y.o. female who presents to the podiatry clinic  with complaint of  right foot pain. Onset of the symptoms was several months ago. Precipitating event: none known. Current symptoms include: ability to bear weight, but with some pain, stiffness, swelling and worsening symptoms after a period of activity. Aggravating factors: any weight bearing. Symptoms have gradually worsened. Patient has had prior foot problems. Evaluation to date: none. Treatment to date: avoidance of offending activity.         Patient Active Problem List   Diagnosis    Osteopenia    Vitamin D insufficiency    Chronic constipation    Pes planus    Fibrocystic breast disease    Family history of diabetes mellitus    Family history of breast cancer    Disc disease, degenerative, lumbar or lumbosacral    Family history of colon cancer    Mastalgia in female    Anxiety       Current Outpatient Prescriptions on File Prior to Visit   Medication Sig Dispense Refill    alprazolam (XANAX) 0.5 MG tablet Take 1 tablet (0.5 mg total) by mouth as needed. 30 tablet 2    dorzolamide-timolol 2-0.5% (COSOPT) 22.3-6.8 mg/mL ophthalmic solution USE 1 DROP INTO BOTH EYES TWICE A DAY  5    fluticasone (FLONASE) 50 mcg/actuation nasal spray SPRAY 2 SPRAYS IN EACH NOSTRIL ONCE A DAY 1 Bottle 6    latanoprost 0.005 % ophthalmic solution INSTILL 1 DROP INTO BOTH EYES EVERY EVENING  4    meclizine (ANTIVERT) 25 mg tablet Take 1 tablet (25 mg total) by mouth 3 (three) times daily as needed for Dizziness or Nausea. 30 tablet 2    meloxicam (MOBIC) 7.5 MG tablet Take 1 tablet (7.5 mg total) by mouth daily as needed. 30 tablet 2    methylPREDNISolone (MEDROL DOSEPACK) 4 mg tablet use as directed 1 Package 0     No current facility-administered medications on file prior to visit.        Review of  patient's allergies indicates:  No Known Allergies    Past Surgical History:   Procedure Laterality Date    BREAST BIOPSY      left breast    BREAST RECONSTRUCTION      Breast reduction  2004    TUBAL LIGATION         Family History   Problem Relation Age of Onset    Breast cancer Maternal Aunt     Colon cancer Cousin     Diabetes Sister         x2    Diabetes Mother     No Known Problems Father     No Known Problems Brother     No Known Problems Maternal Uncle     No Known Problems Paternal Aunt     No Known Problems Paternal Uncle     No Known Problems Maternal Grandmother     No Known Problems Maternal Grandfather     No Known Problems Paternal Grandmother     No Known Problems Paternal Grandfather     Amblyopia Neg Hx     Blindness Neg Hx     Cancer Neg Hx     Cataracts Neg Hx     Glaucoma Neg Hx     Hypertension Neg Hx     Macular degeneration Neg Hx     Retinal detachment Neg Hx     Strabismus Neg Hx     Stroke Neg Hx     Thyroid disease Neg Hx        Social History     Social History    Marital status:      Spouse name: N/A    Number of children: N/A    Years of education: N/A     Occupational History    Not on file.     Social History Main Topics    Smoking status: Never Smoker    Smokeless tobacco: Never Used    Alcohol use No    Drug use: No    Sexual activity: Yes     Partners: Male     Birth control/ protection: Post-menopausal     Other Topics Concern    Not on file     Social History Narrative    No narrative on file               Review of Systems   Constitution: Negative for chills, decreased appetite and fever.   Cardiovascular: Negative for leg swelling.   Musculoskeletal: Positive for joint pain (r foot, l knee) and myalgias. Negative for arthritis and joint swelling.   Gastrointestinal: Negative for nausea and vomiting.   Neurological: Negative for loss of balance, numbness and paresthesias.           Objective:       Vitals:    06/26/18 0811   BP:  "126/82   Pulse: 67   Weight: 80.7 kg (178 lb)   Height: 5' 6" (1.676 m)   PainSc:   8   PainLoc: Foot        Physical Exam   Constitutional: She is oriented to person, place, and time. She appears well-developed and well-nourished.   Cardiovascular:   Pulses:       Dorsalis pedis pulses are 2+ on the right side, and 2+ on the left side.        Posterior tibial pulses are 2+ on the right side, and 2+ on the left side.   Musculoskeletal: She exhibits no edema or tenderness.        Right ankle: Normal.        Left ankle: Normal.        Right foot: There is no swelling, no crepitus and no deformity.        Left foot: There is no swelling, no crepitus and no deformity.   Adequate joint range of motion without pain, limitation, nor crepitation Bilateral feet and ankle joints. Muscle strength is 5/5 in all groups bilaterally.      Flexible pes planus foot type w/ medial arch collapse and mild gastroc equinus     1st MPJ exostosis w/ lateral deviation of hallux, non trackbound. Mild  pain w/ ROM to r hallux. No crepitus    TTP midtarsal jt R w/ mild associated swelling. No cystic lesions noted. No erythema observed   Lymphadenopathy:   No palpable lymph nodes   Neurological: She is alert and oriented to person, place, and time. She has normal strength.   Skin: Skin is warm, dry and intact. No rash noted. No erythema. Nails show no clubbing.   Psychiatric: She has a normal mood and affect. Her behavior is normal.             Assessment:       Encounter Diagnoses   Name Primary?    Bunion of great toe of right foot Yes    Flat foot, acquired, right     Arthralgia, unspecified joint          Plan:       Huber was seen today for foot problem and foot pain.    Diagnoses and all orders for this visit:    Bunion of great toe of right foot    Flat foot, acquired, right    Arthralgia, unspecified joint      I counseled the patient on her conditions, their implications and medical management.      Pain c/with bunion w/ midtarsal jt " pain   Patient instructed on adequate icing techniques. Patient should ice the affected area at least once per day x 10 minutes for 10 days . I advised the  patient that extra icing would also be beneficial to ensure adequate anti inflammatory effect   Patient already has rx mobic, will take prn pain   Discussed surgical and conservative management of bunion deformity. Conservatively we did discuss padding, and shoe modifications such as softer shoes with wide toe boxes. Surgically we briefly discussed pre and post operative expectations. The patient elects for sx management at this time     Plans to have bunion sx repaired following pending L knee replacement in August

## 2018-06-26 NOTE — TELEPHONE ENCOUNTER
Spoke to Mrs. Pena and she just wanted to check on the status of her paperwork that was left this morning, advise patient that it was fax to [facility] as she requested. Patient had a few questions about Q# 3 and Q#6 and also the top of the first page wasn't fill out completely that please give it back to Dr. LOVE for corrections and then fax it again no later than tomorrow morning..

## 2018-06-26 NOTE — TELEPHONE ENCOUNTER
----- Message from Brett Ricketts sent at 6/26/2018 11:43 AM CDT -----  Contact: pt   Pt would like a call back regarding paperwork she left at her appointment need to make sure that it has all the information before sending it to her employer pt insist on message being sent   Pt can be reached at  [phone]

## 2018-07-30 ENCOUNTER — HOSPITAL ENCOUNTER (OUTPATIENT)
Dept: RADIOLOGY | Facility: HOSPITAL | Age: 68
Discharge: HOME | End: 2018-07-30
Attending: ORTHOPAEDIC SURGERY
Payer: COMMERCIAL

## 2018-07-30 ENCOUNTER — CLINICAL SUPPORT (OUTPATIENT)
Dept: LAB | Facility: HOSPITAL | Age: 68
End: 2018-07-30
Attending: ORTHOPAEDIC SURGERY
Payer: COMMERCIAL

## 2018-07-30 DIAGNOSIS — Z01.818 PREOP EXAMINATION: ICD-10-CM

## 2018-07-30 DIAGNOSIS — Z01.818 PREOP EXAMINATION: Primary | ICD-10-CM

## 2018-07-30 PROCEDURE — 71046 X-RAY EXAM CHEST 2 VIEWS: CPT | Mod: 26,,, | Performed by: RADIOLOGY

## 2018-07-30 PROCEDURE — 93005 ELECTROCARDIOGRAM TRACING: CPT

## 2018-07-30 PROCEDURE — 93010 ELECTROCARDIOGRAM REPORT: CPT | Mod: ,,, | Performed by: INTERNAL MEDICINE

## 2018-07-30 PROCEDURE — 71046 X-RAY EXAM CHEST 2 VIEWS: CPT | Mod: TC,FY

## 2018-07-30 PROCEDURE — 93010 EKG 12-LEAD: ICD-10-PCS | Mod: ,,, | Performed by: INTERNAL MEDICINE

## 2018-08-08 ENCOUNTER — TELEPHONE (OUTPATIENT)
Dept: PAIN MEDICINE | Facility: CLINIC | Age: 68
End: 2018-08-08

## 2018-08-08 NOTE — TELEPHONE ENCOUNTER
----- Message from Mya Cochran sent at 8/8/2018  9:45 AM CDT -----  Contact: [phone]/ self   Patient requesting to speak with you regarding a letter she received about having a procedure with Tan Fofana but she has never seen him in clinic. Please advise.

## 2018-08-08 NOTE — TELEPHONE ENCOUNTER
Spoke with pt regarding a letter she received in the mail. Pt stated she received a letter from her insurance company stating she was scheduled to have a procedure with Tan. I explained to pt that Tan does the Iovera procedure for Dr. Lomax prior to having her knee replacement. Pt verbalized understanding.

## 2018-08-10 ENCOUNTER — TELEPHONE (OUTPATIENT)
Dept: FAMILY MEDICINE | Facility: CLINIC | Age: 68
End: 2018-08-10

## 2018-08-10 NOTE — TELEPHONE ENCOUNTER
----- Message from Amita Dimas sent at 8/10/2018  8:21 AM CDT -----  Contact: Self [phone]  Patient is requesting to talk to nurse in regards to some test results. Please advice

## 2018-08-10 NOTE — TELEPHONE ENCOUNTER
Patient called wanting results of labs.  Advised that these were ordered by Dr Lomax.  Patient would need to contact that office.  Patient also wanted to know when her next colonoscopy would be.  Per health maintenance, her next colonoscopy would be in 2023.  Patient stated that she was told by gastro at her last colonoscopy that she should come back in 5 years.  I could not find in the chart where that was documented.  Recommended she contact gastro to verify that this is the case.  Patient verbalized understanding.

## 2018-08-15 ENCOUNTER — HOSPITAL ENCOUNTER (OUTPATIENT)
Dept: PREADMISSION TESTING | Facility: HOSPITAL | Age: 68
Discharge: HOME | End: 2018-08-15
Attending: ORTHOPAEDIC SURGERY
Payer: COMMERCIAL

## 2018-08-15 ENCOUNTER — OFFICE VISIT (OUTPATIENT)
Dept: FAMILY MEDICINE | Facility: HOSPITAL | Age: 68
End: 2018-08-15
Attending: FAMILY MEDICINE
Payer: COMMERCIAL

## 2018-08-15 ENCOUNTER — TELEPHONE (OUTPATIENT)
Dept: GASTROENTEROLOGY | Facility: CLINIC | Age: 68
End: 2018-08-15

## 2018-08-15 ENCOUNTER — ANESTHESIA EVENT (OUTPATIENT)
Dept: SURGERY | Facility: HOSPITAL | Age: 68
DRG: 470 | End: 2018-08-15
Payer: COMMERCIAL

## 2018-08-15 VITALS
HEIGHT: 67 IN | DIASTOLIC BLOOD PRESSURE: 72 MMHG | SYSTOLIC BLOOD PRESSURE: 123 MMHG | BODY MASS INDEX: 28.13 KG/M2 | HEART RATE: 61 BPM | WEIGHT: 179.25 LBS

## 2018-08-15 DIAGNOSIS — Z01.818 PRE-OP EVALUATION: Primary | ICD-10-CM

## 2018-08-15 DIAGNOSIS — M85.80 OSTEOPENIA, UNSPECIFIED LOCATION: Primary | ICD-10-CM

## 2018-08-15 PROCEDURE — 99213 OFFICE O/P EST LOW 20 MIN: CPT | Performed by: FAMILY MEDICINE

## 2018-08-15 RX ORDER — CEFAZOLIN SODIUM 2 G/50ML
2 SOLUTION INTRAVENOUS ONCE
Status: CANCELLED | OUTPATIENT
Start: 2018-08-27

## 2018-08-15 RX ORDER — CELECOXIB 100 MG/1
400 CAPSULE ORAL
Status: CANCELLED | OUTPATIENT
Start: 2018-08-27 | End: 2018-08-27

## 2018-08-15 RX ORDER — PREGABALIN 75 MG/1
150 CAPSULE ORAL
Status: CANCELLED | OUTPATIENT
Start: 2018-08-27 | End: 2018-08-27

## 2018-08-15 RX ORDER — TRANEXAMIC ACID 650 MG/1
1950 TABLET ORAL ONCE
Status: CANCELLED | OUTPATIENT
Start: 2018-08-27

## 2018-08-15 RX ORDER — ACETAMINOPHEN 500 MG
1000 TABLET ORAL
Status: CANCELLED | OUTPATIENT
Start: 2018-08-27 | End: 2018-08-27

## 2018-08-15 NOTE — H&P (VIEW-ONLY)
Subjective:       Patient ID: Huber Pena is a 68 y.o. female.    Chief Complaint: Pre-op Exam    HPI Pt is 69 yo F with PMHx of Anxiety who presents for pre-op evaluation. She is scheduled for left knee replacement 2/2 Osteoarthritis with Dr. Lomax on 8/27/18. States knee injections helped for awhile but would like more long term solution to her chronic knee pain. Patient is a . Reports no recent sickness including PNA, cold. Also denies hx of CVA, MI, DVT. She had breast reduction in 2004 and tolerated anesthesia with no adverse effects. Denies family hx of adverse anesthesia effects. Reports that she lives active lifestyle and walks 4 miles each day with no SOB or chest pain. Did report concern over elevated procalcitonin (1.27) on pre-op labs.       Review of Systems   Constitutional: Negative for chills and fever.   HENT: Negative for congestion and sore throat.    Eyes: Negative for visual disturbance.   Respiratory: Negative for cough, shortness of breath and wheezing.    Cardiovascular: Negative for chest pain.   Gastrointestinal: Negative for abdominal pain, diarrhea, nausea and vomiting.   Endocrine: Negative for polydipsia and polyuria.   Musculoskeletal: Negative for arthralgias.   Skin: Negative for rash.   Neurological: Negative for dizziness and headaches.   Psychiatric/Behavioral: Negative for sleep disturbance. The patient is not nervous/anxious.        Objective:      Vitals:    08/15/18 0936   BP: 123/72   Pulse: 61     Physical Exam   Constitutional: She is oriented to person, place, and time. She appears well-developed and well-nourished. No distress.   HENT:   Head: Normocephalic and atraumatic.   Nose: Rhinorrhea present.   Mouth/Throat: No oropharyngeal exudate or posterior oropharyngeal erythema.   Eyes: Conjunctivae are normal.   Neck: Normal range of motion. Neck supple.   Cardiovascular: Normal rate, regular rhythm, normal heart sounds and intact distal pulses.    Pulmonary/Chest: Effort normal and breath sounds normal.   Musculoskeletal: She exhibits no edema.   Right foot bunion. Left knee with mild edema, crepitus appreciated   Neurological: She is alert and oriented to person, place, and time.   Skin: Skin is warm and dry.   Psychiatric: She has a normal mood and affect.   Vitals reviewed.      Assessment:       1. Pre-op evaluation        Plan:       Pre-op evaluation  -Has Left knee replacement 2/2 Osteoarthritis with Dr. Lomax on 8/27/18  -Pre op labs reviewed. Labs unremarkable except for elevated procal (1.27).   -Pt with no signs of infection which decreases sensitivity and accuracy of procalcitonin. Pt with normal CBC, CRP and ESR which lowers suspicion of any infectious etiology  -Pt has no hx of MI, CVA, HTN and has never smoked  -METS score is 5.78 indicative of high functional capacity  -She is low risk for intermediate risk surgery    Follow-up if symptoms worsen or fail to improve.     Donna Mahoney MD  8/15/2018  [facility] HO[facility]

## 2018-08-15 NOTE — PRE-PROCEDURE INSTRUCTIONS
, Terrence Srivastava,[phone]    Allergies, medical, surgical, family and psychosocial histories reviewed with patient. Periop plan of care reviewed. Preop instructions given, including medications to take and to hold. Time allotted for questions to be addressed.  Patient verbalized understanding.

## 2018-08-15 NOTE — PROGRESS NOTES
I assume primary medical responsibility for this patient, I have reviewed the case history, findings, diagnosis and treatment plan with the resident and agree that the care is reasonable and necessary. This service has been performed by a resident without the presence of a teaching physician under the primary care exception  Darshana Melara  8/15/2018

## 2018-08-15 NOTE — TELEPHONE ENCOUNTER
Patient would like to schedule her repeat 5 year colonoscopy in November. Pt has been placed on a recall.

## 2018-08-15 NOTE — H&P (VIEW-ONLY)
Subjective:       Patient ID: Huber Pena is a 68 y.o. female.    Chief Complaint: Pre-op Exam    HPI Pt is 67 yo F with PMHx of Anxiety who presents for pre-op evaluation. She is scheduled for left knee replacement 2/2 Osteoarthritis with Dr. Lomax on 8/27/18. States knee injections helped for awhile but would like more long term solution to her chronic knee pain. Patient is a . Reports no recent sickness including PNA, cold. Also denies hx of CVA, MI, DVT. She had breast reduction in 2004 and tolerated anesthesia with no adverse effects. Denies family hx of adverse anesthesia effects. Reports that she lives active lifestyle and walks 4 miles each day with no SOB or chest pain. Did report concern over elevated procalcitonin (1.27) on pre-op labs.       Review of Systems   Constitutional: Negative for chills and fever.   HENT: Negative for congestion and sore throat.    Eyes: Negative for visual disturbance.   Respiratory: Negative for cough, shortness of breath and wheezing.    Cardiovascular: Negative for chest pain.   Gastrointestinal: Negative for abdominal pain, diarrhea, nausea and vomiting.   Endocrine: Negative for polydipsia and polyuria.   Musculoskeletal: Negative for arthralgias.   Skin: Negative for rash.   Neurological: Negative for dizziness and headaches.   Psychiatric/Behavioral: Negative for sleep disturbance. The patient is not nervous/anxious.        Objective:      Vitals:    08/15/18 0936   BP: 123/72   Pulse: 61     Physical Exam   Constitutional: She is oriented to person, place, and time. She appears well-developed and well-nourished. No distress.   HENT:   Head: Normocephalic and atraumatic.   Nose: Rhinorrhea present.   Mouth/Throat: No oropharyngeal exudate or posterior oropharyngeal erythema.   Eyes: Conjunctivae are normal.   Neck: Normal range of motion. Neck supple.   Cardiovascular: Normal rate, regular rhythm, normal heart sounds and intact distal pulses.    Pulmonary/Chest: Effort normal and breath sounds normal.   Musculoskeletal: She exhibits no edema.   Right foot bunion. Left knee with mild edema, crepitus appreciated   Neurological: She is alert and oriented to person, place, and time.   Skin: Skin is warm and dry.   Psychiatric: She has a normal mood and affect.   Vitals reviewed.      Assessment:       1. Pre-op evaluation        Plan:       Pre-op evaluation  -Has Left knee replacement 2/2 Osteoarthritis with Dr. Lomax on 8/27/18  -Pre op labs reviewed. Labs unremarkable except for elevated procal (1.27).   -Pt with no signs of infection which decreases sensitivity and accuracy of procalcitonin. Pt with normal CBC, CRP and ESR which lowers suspicion of any infectious etiology  -Pt has no hx of MI, CVA, HTN and has never smoked  -METS score is 5.78 indicative of high functional capacity  -She is low risk for intermediate risk surgery    Follow-up if symptoms worsen or fail to improve.     Donna Mahoney MD  8/15/2018  [facility] HO-[facility]

## 2018-08-15 NOTE — ANESTHESIA PREPROCEDURE EVALUATION
08/15/2018  Pre-operative evaluation for Procedure(s) (LRB):  ARTHROPLASTY, KNEE, SIGHT ASSISTED (Left)    Huber Pena is a 68 y.o. female       Patient Active Problem List   Diagnosis    Osteopenia    Vitamin D insufficiency    Chronic constipation    Pes planus    Fibrocystic breast disease    Family history of diabetes mellitus    Family history of breast cancer    Disc disease, degenerative, lumbar or lumbosacral    Family history of colon cancer    Mastalgia in female    Anxiety       Review of patient's allergies indicates:  No Known Allergies     Current Outpatient Medications on File Prior to Encounter   Medication Sig Dispense Refill    alprazolam (XANAX) 0.5 MG tablet Take 1 tablet (0.5 mg total) by mouth as needed. 30 tablet 2    dorzolamide-timolol 2-0.5% (COSOPT) 22.3-6.8 mg/mL ophthalmic solution USE 1 DROP INTO BOTH EYES TWICE A DAY  5    fluticasone (FLONASE) 50 mcg/actuation nasal spray SPRAY 2 SPRAYS IN EACH NOSTRIL ONCE A DAY 1 Bottle 6    latanoprost 0.005 % ophthalmic solution INSTILL 1 DROP INTO BOTH EYES EVERY EVENING  4    meclizine (ANTIVERT) 25 mg tablet Take 1 tablet (25 mg total) by mouth 3 (three) times daily as needed for Dizziness or Nausea. 30 tablet 2    meloxicam (MOBIC) 7.5 MG tablet Take 1 tablet (7.5 mg total) by mouth daily as needed. 30 tablet 2     No current facility-administered medications on file prior to encounter.        Past Surgical History:   Procedure Laterality Date    BREAST BIOPSY      left breast    BREAST RECONSTRUCTION      Breast reduction  2004    TUBAL LIGATION         Social History     Socioeconomic History    Marital status:      Spouse name: Not on file    Number of children: Not on file    Years of education: Not on file    Highest education level: Not on file   Social Needs    Financial resource strain: Not  on file    Food insecurity - worry: Not on file    Food insecurity - inability: Not on file    Transportation needs - medical: Not on file    Transportation needs - non-medical: Not on file   Occupational History    Not on file   Tobacco Use    Smoking status: Never Smoker    Smokeless tobacco: Never Used   Substance and Sexual Activity    Alcohol use: No     Alcohol/week: 0.0 oz     Frequency: Never     Binge frequency: Never    Drug use: No    Sexual activity: Yes     Partners: Male     Birth control/protection: Post-menopausal   Other Topics Concern    Are you pregnant or think you may be? Not Asked    Breast-feeding Not Asked   Social History Narrative    Not on file         Vital Signs Range (Last 24H):  Wt Readings from Last 1 Encounters:   08/15/18 81.3 kg (179 lb 3.7 oz)     Temp Readings from Last 1 Encounters:   18 36.6 °C (97.8 °F) (Oral)     BP Readings from Last 1 Encounters:   08/15/18 123/72     Pulse Readings from Last 1 Encounters:   08/15/18 61     SpO2 Readings from Last 1 Encounters:   18 98%           CBC:   Lab Results   Component Value Date    WBC 6.10 2018    HGB 14.0 2018    HCT 41.9 2018    MCV 93 2018     2018       CMP:     Chemistry        Component Value Date/Time     2018 1009    K 3.8 2018 1009     2018 1009    CO2 26 2018 1009    BUN 11 2018 1009    CREATININE 0.8 2018 1009    GLU 90 2018 1009        Component Value Date/Time    CALCIUM 9.9 2018 1009    ALKPHOS 93 2018 1009    AST 22 2018 1009    ALT 18 2018 1009    BILITOT 0.6 2018 1009    ESTGFRAFRICA >60 2018 1009    EGFRNONAA >60 2018 1009          INR  Lab Results   Component Value Date    INR 1.0 2018       Diagnostic Studies:      EK18  Sinus bradycardia  Early repolarization  Otherwise normal ECG  When compared with ECG of 2017 07:41,  No  significant change was found  Confirmed by David HICKMAN    2D Echo: none on file          Anesthesia Evaluation    I have reviewed the Patient Summary Reports.     I have reviewed the Medications.     Review of Systems  Anesthesia Hx:  No problems with previous Anesthesia  History of prior surgery of interest to airway management or planning:   Social:  Non-Smoker    Hematology/Oncology:         -- Denies Anemia:   Cardiovascular:  Cardiovascular Normal Exercise tolerance: good  METS>4   Pulmonary:  Pulmonary Normal    Renal/:  Renal/ Normal     Hepatic/GI:  Hepatic/GI Normal    Neurological:  Neurology Normal Hx of vertigo   Endocrine:  Endocrine Normal        Physical Exam  General:  Well nourished    Airway/Jaw/Neck:  Airway Findings: Mouth Opening: Normal Tongue: Normal  General Airway Assessment: Adult  Mallampati: II  Improves to I with phonation.  TM Distance: 4 - 6 cm      Dental:  Dental Findings: In tact   Chest/Lungs:  Chest/Lungs Findings: Clear to auscultation     Heart/Vascular:  Heart Findings: Rate: Normal  Rhythm: Regular Rhythm  Heart murmur: negative       Mental Status:  Mental Status Findings:  Cooperative, Alert and Oriented         Anesthesia Plan  Type of Anesthesia, risks & benefits discussed:  Anesthesia Type:  general, MAC, regional, spinal  Patient's Preference:   Intra-op Monitoring Plan: standard ASA monitors  Intra-op Monitoring Plan Comments:   Post Op Pain Control Plan:   Post Op Pain Control Plan Comments:   Induction:   IV  Beta Blocker:         Informed Consent: Patient understands risks and agrees with Anesthesia plan.  Questions answered. Anesthesia consent signed with patient.  ASA Score: 2     Day of Surgery Review of History & Physical:  There are no significant changes.          Ready For Surgery From Anesthesia Perspective.

## 2018-08-15 NOTE — PROGRESS NOTES
Subjective:       Patient ID: Huber Pena is a 68 y.o. female.    Chief Complaint: Pre-op Exam    HPI Pt is 67 yo F with PMHx of Anxiety who presents for pre-op evaluation. She is scheduled for left knee replacement 2/2 Osteoarthritis with Dr. Lomax on 8/27/18. States knee injections helped for awhile but would like more long term solution to her chronic knee pain. Patient is a . Reports no recent sickness including PNA, cold. Also denies hx of CVA, MI, DVT. She had breast reduction in 2004 and tolerated anesthesia with no adverse effects. Denies family hx of adverse anesthesia effects. Reports that she lives active lifestyle and walks 4 miles each day with no SOB or chest pain. Did report concern over elevated procalcitonin (1.27) on pre-op labs.       Review of Systems   Constitutional: Negative for chills and fever.   HENT: Negative for congestion and sore throat.    Eyes: Negative for visual disturbance.   Respiratory: Negative for cough, shortness of breath and wheezing.    Cardiovascular: Negative for chest pain.   Gastrointestinal: Negative for abdominal pain, diarrhea, nausea and vomiting.   Endocrine: Negative for polydipsia and polyuria.   Musculoskeletal: Negative for arthralgias.   Skin: Negative for rash.   Neurological: Negative for dizziness and headaches.   Psychiatric/Behavioral: Negative for sleep disturbance. The patient is not nervous/anxious.        Objective:      Vitals:    08/15/18 0936   BP: 123/72   Pulse: 61     Physical Exam   Constitutional: She is oriented to person, place, and time. She appears well-developed and well-nourished. No distress.   HENT:   Head: Normocephalic and atraumatic.   Nose: Rhinorrhea present.   Mouth/Throat: No oropharyngeal exudate or posterior oropharyngeal erythema.   Eyes: Conjunctivae are normal.   Neck: Normal range of motion. Neck supple.   Cardiovascular: Normal rate, regular rhythm, normal heart sounds and intact distal pulses.    Pulmonary/Chest: Effort normal and breath sounds normal.   Musculoskeletal: She exhibits no edema.   Right foot bunion. Left knee with mild edema, crepitus appreciated   Neurological: She is alert and oriented to person, place, and time.   Skin: Skin is warm and dry.   Psychiatric: She has a normal mood and affect.   Vitals reviewed.      Assessment:       1. Pre-op evaluation        Plan:       Pre-op evaluation  -Has Left knee replacement 2/2 Osteoarthritis with Dr. Lomax on 8/27/18  -Pre op labs reviewed. Labs unremarkable except for elevated procal (1.27).   -Pt with no signs of infection which decreases sensitivity and accuracy of procalcitonin. Pt with normal CBC, CRP and ESR which lowers suspicion of any infectious etiology  -Pt has no hx of MI, CVA, HTN and has never smoked  -METS score is 5.78 indicative of high functional capacity  -She is low risk for intermediate risk surgery    Follow-up if symptoms worsen or fail to improve.     Donna Mahoney MD  8/15/2018  [facility] HO-[facility]

## 2018-08-15 NOTE — DISCHARGE INSTRUCTIONS
Your surgery is scheduled for 8/27    Please report to Outpatient Surgery Intake Office on the 2nd FLOOR at 0600 a.m.          INSTRUCTIONS IMPORTANT!!!  ¨ Do not eat or drink after 12 midnight-including water. OK to brush teeth, no   gum, candy or mints!                ____  Do not wear makeup, including mascara.  ____  No powder, lotions or creams to surgical area.  ____  Please remove all jewelry, including piercings and leave at home.  ____  No money or valuables needed. Please leave at home.  ____  Please bring any documents given by your doctor.  ____  If going home the same day, arrange for a ride home. You will not be able to             drive if Anesthesia was used.  ____  Wear loose fitting clothing. Allow for dressings, bandages.  ____  Stop Aspirin, Ibuprofen, Motrin and Aleve at least 3-5 days before surgery, unless otherwise instructed by your doctor, or the nurse.   You MAY use Tylenol/acetaminophen until day of surgery.  ____  Wash the surgical area with Hibiclens the night before surgery, and again the             morning of surgery.  Be sure to rinse hibiclens off completely (if instructed by   nurse).  ____  If you take diabetic medication, do not take am of surgery unless instructed by Doctor.  ____  Call MD for temperature above 101 degrees.  ____ Stop taking any Fish Oil supplement or any Vitamins that contain Vitamin E at least 5 days prior to surgery.  ____ Do Not wear your contact lenses the day of your procedure.  You may wear your glasses.        I have read or had read and explained to me, and understand the above information.  Additional comments or instructions:  For additional questions call [phone]      Pre-Op Bathing Instructions    Before surgery, you can play an important role in your own health.    Because skin is not sterile, we need to be sure that your skin is as free of germs as possible. By following the instructions below, you can reduce the number of germs on your skin  before surgery.    IMPORTANT: You will need to shower with a special soap called Hibiclens*, available at any pharmacy.  If you are allergic to Chlorhexidine (the antiseptic in Hibiclens), use an antibacterial soap such as Dial Soap for your preoperative shower.  You will shower with Hibiclens both the night before your surgery and the morning of your surgery.  Do not use Hibiclens on the head, face or genitals to avoid injury to those areas.    STEP #1: THE NIGHT BEFORE YOUR SURGERY     1. Do not shave the area of your body where your surgery will be performed.  2. Shower and wash your hair and body as usual with your normal soap and shampoo.  3. Rinse your hair and body thoroughly after you shower to remove all soap residue.  4. With your hand, apply one packet of Hibiclens soap to the surgical site.   5. Wash the site gently for five (5) minutes. Do not scrub your skin too hard.   6. Do not wash with your regular soap after Hibiclens is used.  7. Rinse your body thoroughly.  8. Pat yourself dry with a clean, soft towel.  9. Do not use lotion, cream, or powder.  10. Wear clean clothes.    STEP #2: THE MORNING OF YOUR SURGERY     1. Repeat Step #1.    * Not to be used by people allergic to Chlorhexidine.      Total Knee Replacement  During total knee replacement surgery, your damaged knee joint is replaced with an artificial joint, called a prosthesis. This surgery almost always reduces joint pain and improves your quality of life.     The parts of the prosthesis are secured to the bones of the knee. Together they form the new joint.   Before your surgery  You will most likely arrive at the hospital on the morning of the surgery. Be sure to follow all of your doctors instructions on preparing for surgery:  · Follow any directions you are given for taking medicines or for not eating or drinking before surgery.  · At the hospital, your temperature, pulse, breathing, and blood pressure will be checked.  · An IV  (intravenous) line will be started to provide fluids and medicines needed during surgery.  The surgical procedure  When the surgical team is ready, youольга be taken to the operating room. There youll be given anesthesia to help you sleep through surgery, or to make you numb from the waist down. Then an incision is made on the front or side of your knee. Any damaged bone is cleaned away, and the new joint components are put into place. The incision is closed with surgical staples or stitches.  After your surgery  After surgery, youll be sent to the recovery room. When you are fully awake, youll be moved to your room. The nurses will give you medicines to ease your pain. You may have a catheter (small tube) in your bladder. A continuous passive motion machine may be used on your knee to keep it from getting stiff. A sequential compression machine may be used to prevent blood clots by gently squeezing then releasing your leg. You may be given medicine to prevent blood clots. Soon, healthcare providers will help you get up and moving.  When to call your doctor  Once at home, call your doctor if you have any of the symptoms below:  · An increase in knee pain  · Pain or swelling in the calf or leg  · Unusual redness, heat, or drainage at the incision site  · Fever of 100.4°F (38°C) or higher  · Shaking chills  · Trouble breathing or chest pain (call 911)   Date Last Reviewed: 9/20/2015  © 8767-0958 [employer]. [address] [place] [address], [place] [id number]. All rights reserved. This information is not intended as a substitute for professional medical care. Always follow your healthcare professional's instructions.      Types of Anesthesia  Your anesthesiologist is a key member of your surgical team. He or she gives you anesthetics (medications to keep you comfortable and decrease your awareness of surgery) and monitors your condition to keep you safe during surgery. You will have 1 of 3 kinds of anesthesia  during your surgery.  Monitored anesthesia care (MAC)  · Often used for surgery that is short or not too invasive.  · Sedatives (medicines to relax you) are given through an IV (intravenous) line.  · The area around the surgical site is usually numbed with a local anesthetic.  · You may choose to remain awake or sleep lightly.  Regional anesthesia (sometimes called spinal epidural or tanya block)  · Often used for surgery on the arms, legs, and abdomen. It is also used during childbirth.  · A specific region of your body is numbed by injecting anesthetic near nerves, near your spine, or near the operative site.  · You may also be given sedatives through an IV line to relax you.  · With regional anesthesia, you may choose to remain awake or sleep lightly.  General anesthesia  · Often used for extensive surgery, such as on the heart, brain, or abdominal operation.  · Also used when the patient wants to be totally asleep.  · May be given as a gas that you breathe and as medicines that are injected through an IV line.  · Because you are asleep, you feel no pain and remember nothing of the surgery.  The risks and complications of anesthesia depend on your overall health. If you are healthy, the risks are low. The risks are higher for patients with heart or lung problems. Your anesthesiologist or nurse anesthetist will discuss the risks with you.   Date Last Reviewed: 12/1/2016 © 2000-2017 [employer]. [address] [place] [id number]. All rights reserved. This information is not intended as a substitute for professional medical care. Always follow your healthcare professional's instructions.

## 2018-08-16 DIAGNOSIS — Z01.818 PRE-OP TESTING: ICD-10-CM

## 2018-08-16 DIAGNOSIS — M25.562 LEFT KNEE PAIN: Primary | ICD-10-CM

## 2018-08-20 ENCOUNTER — TELEPHONE (OUTPATIENT)
Dept: FAMILY MEDICINE | Facility: CLINIC | Age: 68
End: 2018-08-20

## 2018-08-20 ENCOUNTER — OFFICE VISIT (OUTPATIENT)
Dept: FAMILY MEDICINE | Facility: CLINIC | Age: 68
End: 2018-08-20
Payer: COMMERCIAL

## 2018-08-20 VITALS
HEIGHT: 67 IN | SYSTOLIC BLOOD PRESSURE: 122 MMHG | BODY MASS INDEX: 28.13 KG/M2 | TEMPERATURE: 98 F | DIASTOLIC BLOOD PRESSURE: 78 MMHG | WEIGHT: 179.25 LBS | HEART RATE: 64 BPM | OXYGEN SATURATION: 98 %

## 2018-08-20 DIAGNOSIS — B35.6 TINEA INGUINALIS: Primary | ICD-10-CM

## 2018-08-20 DIAGNOSIS — R21 RASH: ICD-10-CM

## 2018-08-20 PROCEDURE — 99999 PR PBB SHADOW E&M-EST. PATIENT-LVL III: CPT | Mod: PBBFAC,,, | Performed by: FAMILY MEDICINE

## 2018-08-20 PROCEDURE — 99214 OFFICE O/P EST MOD 30 MIN: CPT | Mod: S$GLB,,, | Performed by: FAMILY MEDICINE

## 2018-08-20 RX ORDER — NYSTATIN AND TRIAMCINOLONE ACETONIDE 100000; 1 [USP'U]/G; MG/G
CREAM TOPICAL 4 TIMES DAILY
Qty: 60 G | Refills: 0 | Status: SHIPPED | OUTPATIENT
Start: 2018-08-20 | End: 2018-08-20 | Stop reason: ALTCHOICE

## 2018-08-20 RX ORDER — NYSTATIN 100000 U/G
CREAM TOPICAL 2 TIMES DAILY
Qty: 30 G | Refills: 0 | Status: SHIPPED | OUTPATIENT
Start: 2018-08-20 | End: 2018-12-11

## 2018-08-20 NOTE — TELEPHONE ENCOUNTER
----- Message from Jakub Herzog sent at 8/20/2018  3:59 PM CDT -----  Contact: [phone]  Patient advised the nystatin-triamcinolone (MYCOLOG II) cream is expensive and would like an alternate medication and would like the new Rx sent to [facility]. Please call.

## 2018-08-20 NOTE — TELEPHONE ENCOUNTER
----- Message from Jakub Herzog sent at 8/20/2018  8:59 AM CDT -----  Contact: [phone]  Patient would like to be seen sooner than the next available appointment for a rash by her vagina and advised she needs to be seen before her surgery on 08/27. Please advise.

## 2018-08-20 NOTE — TELEPHONE ENCOUNTER
I spoke with patient informed her that Rx was called into [facility].Patient voiced understanding.

## 2018-08-20 NOTE — PROGRESS NOTES
"Subjective:       Patient ID: Huber Pena is a 68 y.o. female.    Chief Complaint: Prakash Ngo is a 68 y.o. female who presents today for an urgent care appointment for a rash. She has a rash for 2-3 weeks and this is improving. She is having surgery on Monday. The rash is present on her right inguinal region. The rash appears to be intermittently itchy. She shaved the inguinal area today but does not regularly shave. No recent travel or camping. No new sexual partners. No new medications. She did change her soap around this time. She hasn't tried anything for this rash. She has never had a rash like this before. When she states rash, it is mostly just 'itchy" but no visible bumps or lesions currently.     She does endorse walking more and sweating more then usual.       Rash   This is a new problem. The current episode started 1 to 4 weeks ago. The problem has been gradually improving since onset. The affected locations include the genitalia. The rash is characterized by itchiness. She was exposed to a new detergent/soap. Pertinent negatives include no congestion, cough, diarrhea, eye pain, facial edema, fever, joint pain, nail changes, rhinorrhea, shortness of breath, sore throat or vomiting. Past treatments include nothing. The treatment provided no relief.     Review of Systems   Constitutional: Negative for chills and fever.   HENT: Negative for congestion, rhinorrhea and sore throat.    Eyes: Negative for pain.   Respiratory: Negative for cough and shortness of breath.    Gastrointestinal: Negative for constipation, diarrhea, nausea and vomiting.   Genitourinary: Negative for difficulty urinating, dyspareunia, hematuria, pelvic pain, urgency, vaginal discharge and vaginal pain.   Musculoskeletal: Negative for joint pain.   Skin: Positive for rash. Negative for color change, nail changes and wound.           Objective:     Vitals:    08/20/18 1318   BP: 122/78   Pulse: 64   Temp: 98 °F (36.7 °C)      "   Physical Exam   Constitutional: She is oriented to person, place, and time. She appears well-developed and well-nourished. No distress.   HENT:   Head: Normocephalic and atraumatic.   Nose: Nose normal.   Mouth/Throat: Oropharynx is clear and moist.   Eyes: Conjunctivae are normal.   Neck: Normal range of motion. Neck supple.   Cardiovascular: Normal rate and regular rhythm.   Pulmonary/Chest: Effort normal and breath sounds normal.   Musculoskeletal: She exhibits no edema.   Neurological: She is alert and oriented to person, place, and time.   Skin: Rash noted. She is not diaphoretic.        Skin appears to be thickened with surrounding erythema and a scaly appearance. There appear to be excoriations in the inguinal area (R).     There are no other visible lesions on legs, groin, back, abdomen,chest, or face. The oropharynx is clear.    Psychiatric: She has a normal mood and affect. Her behavior is normal. Judgment and thought content normal.   Nursing note and vitals reviewed.      Assessment:       1. Tinea inguinalis    2. Rash        Plan:       This appears to be tinea inguinalis. Due to the level of irritation and lichenification, I will start a combination Mycolog cream to the affected area. If this persists, she is to follow up with her PCP or message me to set up follow up with dermatology.     Apply cream to affected area for 10 days. Be careful to avoid the labia and other sensitive skin.     Tinea inguinalis  -     nystatin-triamcinolone (MYCOLOG II) cream; Apply topically 4 (four) times daily.  Dispense: 60 g; Refill: 0    Rash  -     nystatin-triamcinolone (MYCOLOG II) cream; Apply topically 4 (four) times daily.  Dispense: 60 g; Refill: 0      Warning signs discussed, patient to call with any further issues or worsening of symptoms.

## 2018-08-20 NOTE — TELEPHONE ENCOUNTER
----- Message from Jessica Mahoney MA sent at 8/20/2018  4:13 PM CDT -----  Contact: [phone]      ----- Message -----  From: Jakub Herzog  Sent: 8/20/2018   3:59 PM  To: Tomi Georges Staff    Patient advised the nystatin-triamcinolone (MYCOLOG II) cream is expensive and would like an alternate medication and would like the new Rx sent to [facility]. Please call.

## 2018-08-20 NOTE — PATIENT INSTRUCTIONS
Apply cream to affected area for 10 days. Be careful to avoid the labia and other sensitive skin.       Jock Itch (Tinea Cruris, General)  Jock itch (tinea cruris) is a red, itchy rash in the groin caused by a fungal infection. It occurs in skin folds where it is warm and moist. It commonly starts as a small, red, itchy patch that grows larger. The patch is usually in the shape of a round ring, 1 to 2 inches wide. It may cause the skin to flake. It may also spread to the scrotum or the skin that covers your testicles. This infection is treated with skin creams or oral medicine.  Home care  · If you were prescribed a cream, use it exactly as directed. You can buy some antifungal creams without a prescription.  · It may take a week before the fungus starts to go away. It can take about 2 to 3 weeks to completely clear. To stop the rash from coming back, keep using the medicine until the rash is all gone.  · Wash the area at least once a day with soap and water. Pat dry and apply medicine.   · Wear loose-fitting underwear to let your skin breathe. Change your underwear daily.  · Once the rash is gone, keep the area clean and dry to prevent reinfection. If recurrence is a problem, use a medicated antifungal powder daily. This is available over the counter.  Prevention  The following tips may help prevent jock itch:  · Don't share clothes, towels, or sports gear with others unless they have been washed.  · Change your underwear daily.  · Keep skin clean and dry, especially after showering or swimming.  · Lose weight.  · Do not wear tight underwear.  · Treat athlete's foot if it occurs.  Follow-up care  Follow up with your healthcare provider, or as advised. Call your provider if the rash is not starting to improve after 10 days of treatment, or if the rash continues to spread.  When to seek medical advice  Call your healthcare provider right away if any of these occur:  · Increasing pain in the rash area  · Redness that  spreads around the rash  · Fluid draining from the rash  · Rash returns soon after treatment  · Fever of 100.4°F (38°C) or higher, or as directed by your provider  Date Last Reviewed: 8/1/2016  © 0128-3698 [employer]. [address] [place] [address], [place] [address]. All rights reserved. This information is not intended as a substitute for professional medical care. Always follow your healthcare professional's instructions.

## 2018-08-23 ENCOUNTER — HOSPITAL ENCOUNTER (OUTPATIENT)
Facility: HOSPITAL | Age: 68
Discharge: HOME | End: 2018-08-23
Attending: PAIN MEDICINE | Admitting: PAIN MEDICINE
Payer: COMMERCIAL

## 2018-08-23 VITALS
HEART RATE: 80 BPM | DIASTOLIC BLOOD PRESSURE: 77 MMHG | RESPIRATION RATE: 18 BRPM | HEIGHT: 67 IN | WEIGHT: 179.25 LBS | OXYGEN SATURATION: 97 % | TEMPERATURE: 98 F | SYSTOLIC BLOOD PRESSURE: 144 MMHG | BODY MASS INDEX: 28.13 KG/M2

## 2018-08-23 DIAGNOSIS — Z01.818 PRE-OP TESTING: Primary | ICD-10-CM

## 2018-08-23 DIAGNOSIS — M17.12 PRIMARY OSTEOARTHRITIS OF LEFT KNEE: ICD-10-CM

## 2018-08-23 PROCEDURE — 64640 INJECTION TREATMENT OF NERVE: CPT | Mod: LT,,, | Performed by: NURSE PRACTITIONER

## 2018-08-23 PROCEDURE — 64640 INJECTION TREATMENT OF NERVE: CPT | Performed by: NURSE PRACTITIONER

## 2018-08-23 PROCEDURE — 27201689 HC IOVERA SMART TIP/CARTRIDGE: Performed by: NURSE PRACTITIONER

## 2018-08-23 RX ORDER — LIDOCAINE HYDROCHLORIDE 10 MG/ML
10 INJECTION INFILTRATION; PERINEURAL ONCE
Status: DISCONTINUED | OUTPATIENT
Start: 2018-08-23 | End: 2018-08-23 | Stop reason: HOSPADM

## 2018-08-23 NOTE — PLAN OF CARE
Tan Fofana, NP at bedside and spoke to pt, consent signed.   0910 - Time out done  0616 -  Local lidocaine injection done  0920 - Iovera injection begun  0945 - Iovera injections complete and pt tolerated well and bandaids applied per Mr. Fofana  0953 - Discharge instructions given and pt ambulated off unit independently, steady gait.

## 2018-08-23 NOTE — PLAN OF CARE
POC board updated and reviewed with pt. Pt verbalizes understanding. Safety precautions maintained. Call bell in reach.  Bed locked and in lowest position. Instructed pt to call for assistance. Pt verbalizes understanding.

## 2018-08-23 NOTE — INTERVAL H&P NOTE
The patient has been examined and the H&P has been reviewed:    I concur with the findings and no changes have occurred since H&P was written.    Anesthesia/Surgery risks, benefits and alternative options discussed and understood by patient/family.          Active Hospital Problems    Diagnosis  POA    Primary osteoarthritis of left knee [M17.12]  Yes      Resolved Hospital Problems   No resolved problems to display.

## 2018-08-23 NOTE — DISCHARGE INSTRUCTIONS
Iovera After Care at Home     Keep the treatment area clean and dry during healing.     For soreness relief, you may use ice or over-the-counter pain medication (at your  doctors discretion).     Return slowly to normal activities per your doctors discretion. Seek guidance around  safe levels of exertion or stress.     Unless specifically suggested by your doctor, avoid blood-thinning drugs, including  ibuprofen, aspirin or any blood thinners for 48 hours. Acetaminophen, however, may  be an option...ask your doctor!    Long-Term Recommendations:   Utilize opportunities for joint rehabilitation and strengthening.     Exercise appropriately.  Share your experience!  [email or web address]  For more information, please visit [email or web address]

## 2018-08-23 NOTE — OP NOTE
Procedure Note Iovera:    DATE OF PROCEDURE:  8/23/2018    PREOPERATIVE DIAGNOSIS: left knee osteoarthritis.     POSTOPERATIVE DIAGNOSIS: left knee osteoarthritis.     PROCEDURE: Iovera treatment of anterior femoral cutaneous nerve, and both branches of infrapatellar saphenous nerve using at least 3 different punctures to treat all 3 nerves. (cpt 64640 x3)    ATTENDING SURGEON: ZARINA Obrien  Interventional Pain Management    ASSISTANT: none    COMPLICATIONS: None.     IMPLANTS:  None    ESTIMATED BLOOD LOSS:  < 5cc    SPECIMENS REMOVED:  None    ANESTHESIA: Local lidocaine    INDICATIONS FOR PROCEDURE: This is a 68 y.o. female with longstanding knee pain. They have failed non operative management including injections.I discussed a new treatment therapy called Iovera, which is cryotherapy, to provide symptomatic relief along the sensory distribution of the infrapatellar tendon branch of the saphenous nerve  and AFCN. The patient elected to move forward with this  We did discuss the fact that this is a fairly novel procedure and there is very limited scientific data around this.  However, it FDA approved.  The patient was given patient information and literature to review prior to the procedure as well.  Based on this, the patient agreed to move forward with doing the procedure.      PROCEDURE:    The patient was placed supine on the exam table and the proximal medial aspect of the left tibia and anterior aspect of distal femur was prepped with sterile Betadine and alcohol.  A line was drawn extending approximately 5 cm medial to inferior pole of the patella distally to a point approximately 5 cm medial to the tibial tubercle.  A second line was drawn in a medial to lateral direction the width of the patella approximately 7 cm proximal to the patella. We then infiltrated the skin with lidocaine along both lines using a 25g needle. We then introduced the Iovera device along these lines and this device penetrated  the skin, creating cryotherapy to both branches of the infrapatellar saphenous nerve and a third treatment to the anterior femoral cutaneous nerve. 7 punctures of the skin were made to treat the 2 branches of the ISN and another 7 punctures were made to treat the AFCN. There were a total of 3 nerves treated with iovera. The patient tolerated the procedure well with no problems.

## 2018-08-23 NOTE — DISCHARGE SUMMARY
[facility]  Discharge Note  Short Stay    Admit Date: 8/23/2018    Discharge Date and Time: No discharge date for patient encounter.     Attending Physician: Edward George Jr., MD     Discharge Provider: ZARINA Obrien  Interventional Pain Management    Diagnoses:  Active Hospital Problems    Diagnosis  POA    Primary osteoarthritis of left knee [M17.12]  Yes      Resolved Hospital Problems   No resolved problems to display.       Discharged Condition: good    Hospital Course: Patient was admitted for an outpatient procedure and tolerated the procedure well with no complications.    Final Diagnoses: Same as principal problem.    Disposition: Home or Self Care    Follow up/Patient Instructions:    Medications:  Reconciled Home Medications:      Medication List      ASK your doctor about these medications    ALPRAZolam 0.5 MG tablet  Commonly known as:  XANAX  Take 1 tablet (0.5 mg total) by mouth as needed.     dorzolamide-timolol 2-0.5% 22.3-6.8 mg/mL ophthalmic solution  Commonly known as:  COSOPT  USE 1 DROP INTO BOTH EYES TWICE A DAY     fluticasone 50 mcg/actuation nasal spray  Commonly known as:  FLONASE  SPRAY 2 SPRAYS IN EACH NOSTRIL ONCE A DAY     meclizine 25 mg tablet  Commonly known as:  ANTIVERT  Take 1 tablet (25 mg total) by mouth 3 (three) times daily as needed for Dizziness or Nausea.     meloxicam 7.5 MG tablet  Commonly known as:  MOBIC  Take 1 tablet (7.5 mg total) by mouth daily as needed.     nystatin cream  Commonly known as:  MYCOSTATIN  Apply topically to affected area 2 (two) times daily.          Discharge Procedure Orders   TYPE AND SCREEN PREOP   Standing Status: Future Number of Occurrences: 1 Standing Exp. Date: 10/19/19         Discharge Procedure Orders (must include Diet, Follow-up, Activity):   Discharge Procedure Orders (must include Diet, Follow-up, Activity)   TYPE AND SCREEN PREOP   Standing Status: Future Number of Occurrences: 1 Standing Exp. Date: 10/19/19         Discharge Diagnosis: Same as Pre and Post Procedure  Condition on Discharge: Stable.  Diet on Discharge: Same as before.  Activity: as per instruction sheet.  Discharge to: Home with a responsible adult.  Follow up: as per Discharge instructions

## 2018-08-27 ENCOUNTER — HOSPITAL ENCOUNTER (INPATIENT)
Facility: HOSPITAL | Age: 68
LOS: 1 days | Discharge: HOME | DRG: 470 | End: 2018-08-27
Attending: ORTHOPAEDIC SURGERY | Admitting: ORTHOPAEDIC SURGERY
Payer: COMMERCIAL

## 2018-08-27 ENCOUNTER — ANESTHESIA (OUTPATIENT)
Dept: SURGERY | Facility: HOSPITAL | Age: 68
DRG: 470 | End: 2018-08-27
Payer: COMMERCIAL

## 2018-08-27 ENCOUNTER — TELEPHONE (OUTPATIENT)
Dept: GASTROENTEROLOGY | Facility: CLINIC | Age: 68
End: 2018-08-27

## 2018-08-27 VITALS
SYSTOLIC BLOOD PRESSURE: 128 MMHG | BODY MASS INDEX: 27.62 KG/M2 | HEART RATE: 68 BPM | HEIGHT: 67 IN | RESPIRATION RATE: 16 BRPM | OXYGEN SATURATION: 99 % | WEIGHT: 176 LBS | TEMPERATURE: 98 F | DIASTOLIC BLOOD PRESSURE: 72 MMHG

## 2018-08-27 DIAGNOSIS — M17.10 ARTHRITIS OF KNEE: ICD-10-CM

## 2018-08-27 DIAGNOSIS — M17.12 PRIMARY OSTEOARTHRITIS OF LEFT KNEE: Primary | ICD-10-CM

## 2018-08-27 DIAGNOSIS — M85.80 OSTEOPENIA, UNSPECIFIED LOCATION: ICD-10-CM

## 2018-08-27 LAB
APPEARANCE FLD: NORMAL
BODY FLD TYPE: NORMAL
COLOR FLD: YELLOW
LYMPHOCYTES NFR FLD MANUAL: 62 %
MESOTHL CELL NFR FLD MANUAL: 4 %
MONOS+MACROS NFR FLD MANUAL: 32 %
NEUTROPHILS NFR FLD MANUAL: 2 %
WBC # FLD: 153 /CU MM

## 2018-08-27 PROCEDURE — 0SRD0J9 REPLACEMENT OF LEFT KNEE JOINT WITH SYNTHETIC SUBSTITUTE, CEMENTED, OPEN APPROACH: ICD-10-PCS | Performed by: ORTHOPAEDIC SURGERY

## 2018-08-27 PROCEDURE — 63600175 PHARM REV CODE 636 W HCPCS: Performed by: STUDENT IN AN ORGANIZED HEALTH CARE EDUCATION/TRAINING PROGRAM

## 2018-08-27 PROCEDURE — 12000002 HC ACUTE/MED SURGE SEMI-PRIVATE ROOM

## 2018-08-27 PROCEDURE — 36000711: Performed by: ORTHOPAEDIC SURGERY

## 2018-08-27 PROCEDURE — 97116 GAIT TRAINING THERAPY: CPT

## 2018-08-27 PROCEDURE — 63600175 PHARM REV CODE 636 W HCPCS: Performed by: ANESTHESIOLOGY

## 2018-08-27 PROCEDURE — 97165 OT EVAL LOW COMPLEX 30 MIN: CPT

## 2018-08-27 PROCEDURE — 36000710: Performed by: ORTHOPAEDIC SURGERY

## 2018-08-27 PROCEDURE — 64447 NJX AA&/STRD FEMORAL NRV IMG: CPT | Performed by: ANESTHESIOLOGY

## 2018-08-27 PROCEDURE — 25000003 PHARM REV CODE 250: Performed by: STUDENT IN AN ORGANIZED HEALTH CARE EDUCATION/TRAINING PROGRAM

## 2018-08-27 PROCEDURE — G8978 MOBILITY CURRENT STATUS: HCPCS | Mod: CK

## 2018-08-27 PROCEDURE — C1776 JOINT DEVICE (IMPLANTABLE): HCPCS | Performed by: ORTHOPAEDIC SURGERY

## 2018-08-27 PROCEDURE — 25000003 PHARM REV CODE 250: Performed by: ORTHOPAEDIC SURGERY

## 2018-08-27 PROCEDURE — C1713 ANCHOR/SCREW BN/BN,TIS/BN: HCPCS | Performed by: ORTHOPAEDIC SURGERY

## 2018-08-27 PROCEDURE — 37000008 HC ANESTHESIA 1ST 15 MINUTES: Performed by: ORTHOPAEDIC SURGERY

## 2018-08-27 PROCEDURE — 71000016 HC POSTOP RECOV ADDL HR: Performed by: ORTHOPAEDIC SURGERY

## 2018-08-27 PROCEDURE — 37000009 HC ANESTHESIA EA ADD 15 MINS: Performed by: ORTHOPAEDIC SURGERY

## 2018-08-27 PROCEDURE — 97535 SELF CARE MNGMENT TRAINING: CPT

## 2018-08-27 PROCEDURE — 97161 PT EVAL LOW COMPLEX 20 MIN: CPT

## 2018-08-27 PROCEDURE — S0020 INJECTION, BUPIVICAINE HYDRO: HCPCS | Performed by: ORTHOPAEDIC SURGERY

## 2018-08-27 PROCEDURE — 89051 BODY FLUID CELL COUNT: CPT

## 2018-08-27 PROCEDURE — 27201423 OPTIME MED/SURG SUP & DEVICES STERILE SUPPLY: Performed by: ORTHOPAEDIC SURGERY

## 2018-08-27 PROCEDURE — 71000039 HC RECOVERY, EACH ADD'L HOUR: Performed by: ORTHOPAEDIC SURGERY

## 2018-08-27 PROCEDURE — 71000033 HC RECOVERY, INTIAL HOUR: Performed by: ORTHOPAEDIC SURGERY

## 2018-08-27 PROCEDURE — 8E0YXBZ COMPUTER ASSISTED PROCEDURE OF LOWER EXTREMITY: ICD-10-PCS | Performed by: ORTHOPAEDIC SURGERY

## 2018-08-27 PROCEDURE — 63600175 PHARM REV CODE 636 W HCPCS: Performed by: ORTHOPAEDIC SURGERY

## 2018-08-27 PROCEDURE — G8979 MOBILITY GOAL STATUS: HCPCS | Mod: CJ

## 2018-08-27 PROCEDURE — 71000015 HC POSTOP RECOV 1ST HR: Performed by: ORTHOPAEDIC SURGERY

## 2018-08-27 DEVICE — COMPONENT FEMORAL PS #3 LT: Type: IMPLANTABLE DEVICE | Site: KNEE | Status: FUNCTIONAL

## 2018-08-27 DEVICE — PATELLA XPE MEDIUM 32MM: Type: IMPLANTABLE DEVICE | Site: KNEE | Status: FUNCTIONAL

## 2018-08-27 DEVICE — CEMENT BONE LV G PALACOS 1X40: Type: IMPLANTABLE DEVICE | Site: KNEE | Status: FUNCTIONAL

## 2018-08-27 DEVICE — INSERT TIBIAL PS #3 XPE 9MM: Type: IMPLANTABLE DEVICE | Site: KNEE | Status: FUNCTIONAL

## 2018-08-27 DEVICE — TIBIAL BASEPLATE CEMENTED #3: Type: IMPLANTABLE DEVICE | Site: KNEE | Status: FUNCTIONAL

## 2018-08-27 RX ORDER — BISACODYL 10 MG
10 SUPPOSITORY, RECTAL RECTAL 2 TIMES DAILY PRN
Status: DISCONTINUED | OUTPATIENT
Start: 2018-08-27 | End: 2018-08-27 | Stop reason: HOSPADM

## 2018-08-27 RX ORDER — CEFAZOLIN SODIUM 2 G/50ML
2 SOLUTION INTRAVENOUS
Status: DISCONTINUED | OUTPATIENT
Start: 2018-08-27 | End: 2018-08-27 | Stop reason: HOSPADM

## 2018-08-27 RX ORDER — TRANEXAMIC ACID 650 MG/1
1950 TABLET ORAL ONCE
Status: COMPLETED | OUTPATIENT
Start: 2018-08-27 | End: 2018-08-27

## 2018-08-27 RX ORDER — PHENYLEPHRINE HYDROCHLORIDE 10 MG/ML
INJECTION INTRAVENOUS
Status: DISCONTINUED | OUTPATIENT
Start: 2018-08-27 | End: 2018-08-27

## 2018-08-27 RX ORDER — CELECOXIB 100 MG/1
200 CAPSULE ORAL 2 TIMES DAILY
Status: DISCONTINUED | OUTPATIENT
Start: 2018-08-27 | End: 2018-08-27 | Stop reason: HOSPADM

## 2018-08-27 RX ORDER — FAMOTIDINE 20 MG/1
20 TABLET, FILM COATED ORAL 2 TIMES DAILY
Qty: 84 TABLET | Refills: 0 | Status: SHIPPED | OUTPATIENT
Start: 2018-08-27 | End: 2018-12-11

## 2018-08-27 RX ORDER — BUPIVACAINE HYDROCHLORIDE 7.5 MG/ML
INJECTION, SOLUTION EPIDURAL; RETROBULBAR
Status: COMPLETED | OUTPATIENT
Start: 2018-08-27 | End: 2018-08-27

## 2018-08-27 RX ORDER — LIDOCAINE HCL/PF 100 MG/5ML
SYRINGE (ML) INTRAVENOUS
Status: DISCONTINUED | OUTPATIENT
Start: 2018-08-27 | End: 2018-08-27

## 2018-08-27 RX ORDER — ROPIVACAINE HYDROCHLORIDE 5 MG/ML
INJECTION, SOLUTION EPIDURAL; INFILTRATION; PERINEURAL
Status: DISCONTINUED | OUTPATIENT
Start: 2018-08-27 | End: 2018-08-27

## 2018-08-27 RX ORDER — HYDROCODONE BITARTRATE AND ACETAMINOPHEN 10; 325 MG/1; MG/1
1 TABLET ORAL EVERY 4 HOURS PRN
Status: DISCONTINUED | OUTPATIENT
Start: 2018-08-27 | End: 2018-08-27 | Stop reason: HOSPADM

## 2018-08-27 RX ORDER — ONDANSETRON 2 MG/ML
4 INJECTION INTRAMUSCULAR; INTRAVENOUS EVERY 12 HOURS PRN
Status: DISCONTINUED | OUTPATIENT
Start: 2018-08-27 | End: 2018-08-27 | Stop reason: HOSPADM

## 2018-08-27 RX ORDER — MIDAZOLAM HYDROCHLORIDE 1 MG/ML
INJECTION, SOLUTION INTRAMUSCULAR; INTRAVENOUS
Status: DISCONTINUED | OUTPATIENT
Start: 2018-08-27 | End: 2018-08-27

## 2018-08-27 RX ORDER — PREGABALIN 75 MG/1
150 CAPSULE ORAL
Status: COMPLETED | OUTPATIENT
Start: 2018-08-27 | End: 2018-08-27

## 2018-08-27 RX ORDER — FAMOTIDINE 20 MG/1
20 TABLET, FILM COATED ORAL 2 TIMES DAILY
Status: DISCONTINUED | OUTPATIENT
Start: 2018-08-27 | End: 2018-08-27 | Stop reason: HOSPADM

## 2018-08-27 RX ORDER — TRANEXAMIC ACID 100 MG/ML
1000 INJECTION, SOLUTION INTRAVENOUS
Status: DISCONTINUED | OUTPATIENT
Start: 2018-08-27 | End: 2018-08-27 | Stop reason: HOSPADM

## 2018-08-27 RX ORDER — HYDROMORPHONE HYDROCHLORIDE 2 MG/ML
INJECTION, SOLUTION INTRAMUSCULAR; INTRAVENOUS; SUBCUTANEOUS
Status: DISCONTINUED
Start: 2018-08-27 | End: 2018-08-27 | Stop reason: WASHOUT

## 2018-08-27 RX ORDER — OXYCODONE HYDROCHLORIDE 5 MG/1
5 TABLET ORAL
Status: DISCONTINUED | OUTPATIENT
Start: 2018-08-27 | End: 2018-08-27 | Stop reason: HOSPADM

## 2018-08-27 RX ORDER — PROPOFOL 10 MG/ML
INJECTION, EMULSION INTRAVENOUS CONTINUOUS PRN
Status: DISCONTINUED | OUTPATIENT
Start: 2018-08-27 | End: 2018-08-27

## 2018-08-27 RX ORDER — ONDANSETRON 2 MG/ML
4 INJECTION INTRAMUSCULAR; INTRAVENOUS DAILY PRN
Status: DISCONTINUED | OUTPATIENT
Start: 2018-08-27 | End: 2018-08-27 | Stop reason: HOSPADM

## 2018-08-27 RX ORDER — CEPHALEXIN 500 MG/1
500 CAPSULE ORAL EVERY 6 HOURS
Qty: 4 CAPSULE | Refills: 0 | Status: SHIPPED | OUTPATIENT
Start: 2018-08-27 | End: 2018-08-28

## 2018-08-27 RX ORDER — CELECOXIB 100 MG/1
400 CAPSULE ORAL
Status: COMPLETED | OUTPATIENT
Start: 2018-08-27 | End: 2018-08-27

## 2018-08-27 RX ORDER — BUPIVACAINE HYDROCHLORIDE 2.5 MG/ML
INJECTION, SOLUTION INFILTRATION; PERINEURAL
Status: DISCONTINUED | OUTPATIENT
Start: 2018-08-27 | End: 2018-08-27 | Stop reason: HOSPADM

## 2018-08-27 RX ORDER — HYDROCODONE BITARTRATE AND ACETAMINOPHEN 5; 325 MG/1; MG/1
1 TABLET ORAL EVERY 6 HOURS PRN
Qty: 28 TABLET | Refills: 0 | Status: SHIPPED | OUTPATIENT
Start: 2018-08-27 | End: 2018-12-11

## 2018-08-27 RX ORDER — TRANEXAMIC ACID 650 MG/1
1950 TABLET ORAL ONCE
Status: DISCONTINUED | OUTPATIENT
Start: 2018-08-27 | End: 2018-08-27

## 2018-08-27 RX ORDER — ASPIRIN 81 MG/1
81 TABLET ORAL 2 TIMES DAILY
Qty: 84 TABLET | Refills: 0 | Status: SHIPPED | OUTPATIENT
Start: 2018-08-27 | End: 2018-12-11

## 2018-08-27 RX ORDER — SODIUM CHLORIDE 0.9 % (FLUSH) 0.9 %
3 SYRINGE (ML) INJECTION
Status: DISCONTINUED | OUTPATIENT
Start: 2018-08-27 | End: 2018-08-27 | Stop reason: HOSPADM

## 2018-08-27 RX ORDER — CEFAZOLIN SODIUM 2 G/50ML
2 SOLUTION INTRAVENOUS ONCE
Status: DISCONTINUED | OUTPATIENT
Start: 2018-08-27 | End: 2018-08-27 | Stop reason: HOSPADM

## 2018-08-27 RX ORDER — HYDROMORPHONE HYDROCHLORIDE 2 MG/ML
0.2 INJECTION, SOLUTION INTRAMUSCULAR; INTRAVENOUS; SUBCUTANEOUS EVERY 5 MIN PRN
Status: DISCONTINUED | OUTPATIENT
Start: 2018-08-27 | End: 2018-08-27

## 2018-08-27 RX ORDER — TRANEXAMIC ACID 100 MG/ML
INJECTION, SOLUTION INTRAVENOUS
Status: DISCONTINUED | OUTPATIENT
Start: 2018-08-27 | End: 2018-08-27 | Stop reason: HOSPADM

## 2018-08-27 RX ORDER — DEXAMETHASONE SODIUM PHOSPHATE 4 MG/ML
10 INJECTION, SOLUTION INTRA-ARTICULAR; INTRALESIONAL; INTRAMUSCULAR; INTRAVENOUS; SOFT TISSUE ONCE
Status: DISCONTINUED | OUTPATIENT
Start: 2018-08-27 | End: 2018-08-27 | Stop reason: HOSPADM

## 2018-08-27 RX ORDER — HYDROMORPHONE HYDROCHLORIDE 2 MG/ML
0.5 INJECTION, SOLUTION INTRAMUSCULAR; INTRAVENOUS; SUBCUTANEOUS EVERY 5 MIN PRN
Status: DISCONTINUED | OUTPATIENT
Start: 2018-08-27 | End: 2018-08-27 | Stop reason: HOSPADM

## 2018-08-27 RX ORDER — CEFAZOLIN SODIUM 2 G/50ML
2 SOLUTION INTRAVENOUS ONCE
Status: COMPLETED | OUTPATIENT
Start: 2018-08-27 | End: 2018-08-27

## 2018-08-27 RX ORDER — PROPOFOL 10 MG/ML
INJECTION, EMULSION INTRAVENOUS
Status: DISCONTINUED | OUTPATIENT
Start: 2018-08-27 | End: 2018-08-27

## 2018-08-27 RX ORDER — ACETAMINOPHEN 500 MG
1000 TABLET ORAL
Status: COMPLETED | OUTPATIENT
Start: 2018-08-27 | End: 2018-08-27

## 2018-08-27 RX ORDER — PREGABALIN 75 MG/1
75 CAPSULE ORAL 2 TIMES DAILY
Status: DISCONTINUED | OUTPATIENT
Start: 2018-08-27 | End: 2018-08-27 | Stop reason: HOSPADM

## 2018-08-27 RX ORDER — AMOXICILLIN 250 MG
1 CAPSULE ORAL 2 TIMES DAILY
Status: DISCONTINUED | OUTPATIENT
Start: 2018-08-27 | End: 2018-08-27 | Stop reason: HOSPADM

## 2018-08-27 RX ORDER — SODIUM CHLORIDE, SODIUM LACTATE, POTASSIUM CHLORIDE, CALCIUM CHLORIDE 600; 310; 30; 20 MG/100ML; MG/100ML; MG/100ML; MG/100ML
INJECTION, SOLUTION INTRAVENOUS CONTINUOUS PRN
Status: DISCONTINUED | OUTPATIENT
Start: 2018-08-27 | End: 2018-08-27

## 2018-08-27 RX ORDER — TRANEXAMIC ACID 100 MG/ML
1000 INJECTION, SOLUTION INTRAVENOUS ONCE
Status: DISCONTINUED | OUTPATIENT
Start: 2018-08-27 | End: 2018-08-27 | Stop reason: HOSPADM

## 2018-08-27 RX ADMIN — SODIUM CHLORIDE, SODIUM LACTATE, POTASSIUM CHLORIDE, AND CALCIUM CHLORIDE: .6; .31; .03; .02 INJECTION, SOLUTION INTRAVENOUS at 09:08

## 2018-08-27 RX ADMIN — ROPIVACAINE HYDROCHLORIDE 20 ML: 5 INJECTION, SOLUTION EPIDURAL; INFILTRATION; PERINEURAL at 11:08

## 2018-08-27 RX ADMIN — CELECOXIB 400 MG: 100 CAPSULE ORAL at 07:08

## 2018-08-27 RX ADMIN — TRANEXAMIC ACID 1950 MG: 650 TABLET, FILM COATED ORAL at 07:08

## 2018-08-27 RX ADMIN — PHENYLEPHRINE HYDROCHLORIDE 100 MCG: 10 INJECTION INTRAVENOUS at 08:08

## 2018-08-27 RX ADMIN — MIDAZOLAM 2 MG: 1 INJECTION INTRAMUSCULAR; INTRAVENOUS at 07:08

## 2018-08-27 RX ADMIN — CEFAZOLIN SODIUM 2 G: 2 SOLUTION INTRAVENOUS at 08:08

## 2018-08-27 RX ADMIN — SODIUM CHLORIDE, SODIUM LACTATE, POTASSIUM CHLORIDE, AND CALCIUM CHLORIDE: .6; .31; .03; .02 INJECTION, SOLUTION INTRAVENOUS at 07:08

## 2018-08-27 RX ADMIN — ACETAMINOPHEN 1000 MG: 500 TABLET ORAL at 07:08

## 2018-08-27 RX ADMIN — PROPOFOL 50 MG: 10 INJECTION, EMULSION INTRAVENOUS at 08:08

## 2018-08-27 RX ADMIN — PHENYLEPHRINE HYDROCHLORIDE 100 MCG: 10 INJECTION INTRAVENOUS at 09:08

## 2018-08-27 RX ADMIN — ONDANSETRON 4 MG: 2 INJECTION INTRAMUSCULAR; INTRAVENOUS at 02:08

## 2018-08-27 RX ADMIN — PROMETHAZINE HYDROCHLORIDE 6.25 MG: 25 INJECTION INTRAMUSCULAR; INTRAVENOUS at 03:08

## 2018-08-27 RX ADMIN — LIDOCAINE HYDROCHLORIDE 80 MG: 20 INJECTION, SOLUTION INTRAVENOUS at 07:08

## 2018-08-27 RX ADMIN — BUPIVACAINE HYDROCHLORIDE 1.6 ML: 7.5 INJECTION, SOLUTION EPIDURAL; RETROBULBAR at 07:08

## 2018-08-27 RX ADMIN — PROPOFOL 100 MCG/KG/MIN: 10 INJECTION, EMULSION INTRAVENOUS at 07:08

## 2018-08-27 RX ADMIN — OXYCODONE HYDROCHLORIDE 5 MG: 5 TABLET ORAL at 11:08

## 2018-08-27 RX ADMIN — PREGABALIN 150 MG: 75 CAPSULE ORAL at 07:08

## 2018-08-27 NOTE — DISCHARGE SUMMARY
[facility] Discharge Summary   Patient ID:  Huber Pena  [id number]  68 y.o.  1950    Admit date: 8/27/2018    Discharge date and time: 8/27/2018     Admitting Physician: Santiago Lomax MD     Discharge Physician: Santiago Lomax MD    Admission Diagnoses: Osteoarthritis of left knee [M17.12]  Arthritis of knee [M17.10]     Final Diagnoses:    Principal Problem: Osteoarthritis of left knee [M17.12]  Arthritis of knee [M17.10]    Secondary Diagnoses: knee pain    Admission Condition: good    Discharged Condition: good    Consults: None    Operative Procedure: L TKA    Indications for procedure: This is a 68 y.o. female with Osteoarthritis of left knee [M17.12]  Arthritis of knee [M17.10] that was refractory to non-surgical management. After discussion of risks benefits and alternatives, they elected to proceed with the above procedure.     Hospital Course: Pt was admitted to same day surgery on 8/27/2018 for the above procedure. Pt underwent procedure, tolerated it well and without complication. Pt was brought to PACU and subsequently stepped down back to same day surgery. In same day, pt's pain was adequately controlled with PO pain medicine and pt met all criteria and was deemed stable for discharge. Pt was discharged to home with PO pain medicine, thorough wound care instructions, and appropriate L-ortho clinic follow up.     Physical Exam:  General: NAD, A&Ox3  Cardiac: RRR by PP  Pulm: Non-labored WOB  Abd: soft, non-tender non-distented  LLE: Sterile surgical dressings C/D/I  NVID    Disposition: Home or Self Care     Huber Pena   Home Medication Instructions NICOLASA:[id number]    Printed on:08/27/18 9112   Medication Information                      alprazolam (XANAX) 0.5 MG tablet  Take 1 tablet (0.5 mg total) by mouth as needed.             aspirin (ECOTRIN) 81 MG EC tablet  Take 1 tablet (81 mg total) by mouth 2 (two) times daily.             cephALEXin (KEFLEX) 500 MG capsule  Take 1  capsule (500 mg total) by mouth every 6 (six) hours. for 4 doses             docusate sodium (COLACE) 50 MG capsule  Take 1 capsule (50 mg total) by mouth every 6 (six) hours.             dorzolamide-timolol 2-0.5% (COSOPT) 22.3-6.8 mg/mL ophthalmic solution  USE 1 DROP INTO BOTH EYES TWICE A DAY             famotidine (PEPCID) 20 MG tablet  Take 1 tablet (20 mg total) by mouth 2 (two) times daily.             fluticasone (FLONASE) 50 mcg/actuation nasal spray  SPRAY 2 SPRAYS IN EACH NOSTRIL ONCE A DAY             HYDROcodone-acetaminophen (NORCO) 5-325 mg per tablet  Take 1 tablet by mouth every 6 (six) hours as needed for Pain.             meclizine (ANTIVERT) 25 mg tablet  Take 1 tablet (25 mg total) by mouth 3 (three) times daily as needed for Dizziness or Nausea.             meloxicam (MOBIC) 7.5 MG tablet  Take 1 tablet (7.5 mg total) by mouth daily as needed.             nystatin (MYCOSTATIN) cream  Apply topically to affected area 2 (two) times daily.                  Activity: as directed  Diet: regular diet  Wound Care: as directed  Follow-up with surgery in two weeks    Signed:  Ramiro Frost MD  [facility]  8/27/2018   6:17 PM

## 2018-08-27 NOTE — PLAN OF CARE
Problem: Occupational Therapy Goal  Goal: Occupational Therapy Goal  Outcome: Outcome(s) achieved Date Met: 08/27/18  OT angélica performed, report to follow    OP PT

## 2018-08-27 NOTE — PLAN OF CARE
Pt states name and . Verified to armband. Pt verifies procedure to be done. Verified to consent x2 and EPIC chart. Placed on cardiac monitor x3, pulse ox, and O2.     1018: nerve block started. Cont to monitor.     1030 nerve block complete.

## 2018-08-27 NOTE — PLAN OF CARE
Problem: Physical Therapy Goal  Goal: Physical Therapy Goal  Outcome: Outcome(s) achieved Date Met: 08/27/18  PT evaluation completed, note to follow. Pt and pt's family educated on gait training with RW, step negotiation, car transfers, LLE elevation, use of ice for pain management. During 1st attempt, pt with dizziness and nausea with hypotension. Pt with improved BP and activity tolerance during 2nd attempt. Plan to d/c home with OP PT to begin tomorrow.

## 2018-08-27 NOTE — PT/OT/SLP EVAL
Physical Therapy Evaluation, Treatment, and Discharge    Patient Name:  Huber Pena   MRN:  [id number]    Recommendations:     Discharge Recommendations:  outpatient PT   Discharge Equipment Recommendations: none   Barriers to discharge: None    Assessment:     Huber Pena is a 68 y.o. female admitted with a medical diagnosis of The primary encounter diagnosis was Primary osteoarthritis of left knee. Diagnoses of Arthritis of knee and Osteopenia, unspecified location were also pertinent to this visit.  She presents with the following impairments/functional limitations:  weakness, pain, edema, decreased ROM. Pt and pt's family educated on gait training with RW, step negotiation, car transfers, LLE elevation, use of ice for pain management. During 1st attempt, pt with dizziness and nausea with hypotension. Pt with improved BP and activity tolerance during 2nd attempt. Plan to d/c home with OP PT to begin tomorrow.    Rehab Prognosis: Excellent; patient would benefit from acute skilled PT services to address these deficits and reach maximum level of function.      Recent Surgery: Procedure(s) (LRB):  ARTHROPLASTY, KNEE, SIGHT ASSISTED (Left) Day of Surgery    Plan:     During this hospitalization, patient to be seen (d/c today with OP PT tomorrow) to address the above listed problems via    · Plan of Care Expires:  08/27/18   Plan of Care Reviewed with: patient, spouse, daughter    Subjective     Communicated with SKYLER Tony prior to session.  Patient found supine with HOB elevated upon PT entry to room, agreeable to evaluation.      Chief Complaint: reports dizziness/nause during 1st session and nausea at end of 2nd session  Patient comments/goals: pt reporting wanting to go home today  Pain/Comfort:  · Pain Rating 1: 2/10  · Location - Side 1: Left  · Location - Orientation 1: generalized  · Location 1: knee  · Pain Addressed 1: Pre-medicate for activity, Reposition, Distraction    Patients cultural, spiritual,  "Rastafari conflicts given the current situation: none reported    Living Environment:  Pt lives with her  in a H with 6 BLAINE and B rails but can only reach one at a time. Has a tub/shower combo and purchased a TTB.  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  Patient has the following equipment: none(ordered and delivered: RW, BSC, TTB).  DME owned (not currently used): rolling walker, bedside commode and transfer tub bench.  Upon discharge, patient will have assistance from her family, reporting her  will be primarily working form home but may have to leave at times. Pt's daughter also present and very involved in pt's care.    Objective:     Patient found with: peripheral IV, blood pressure cuff     General Precautions: Standard, fall   Orthopedic Precautions:LLE weight bearing as tolerated   Braces: N/A     Exams:  · Sensation:    · -       Intact  · Skin Integrity/Edema:      · -       Skin integrity: surgical incision with dressing to L knee  · RLE ROM: WFL  · RLE Strength: WFL  · LLE ROM: WFL except knee AROM ~20-80 deg  · LLE Strength: anlke DF 5/5, knee ext 3-/5, hip flexion 3-/5 (resistance not applied at knee or hip)    Functional Mobility:  · Bed Mobility:     · Scooting: supervision  · Supine to Sit: supervision  · Sit to Supine: minimum assistance  · Transfers:     · Sit to Stand:  contact guard assistance with rolling walker  · Toilet Transfer: stand by assistance with  rolling walker  using  Step Transfer  · Gait: 60 ft with RW and CGa initially with cues for 3-pint gait pattern then progressed to SBA   · Stairs: 6" step with bed rail as single handrail and CGA    AM-PAC 6 CLICK MOBILITY  Total Score:18       Therapeutic Activities and Exercises:  Educated on use of ice for pain/edema management no more than 15 mins each hour, gait training, step " training, car transfers, LLE elevation while maintaining L knee extension, OT educated on LB dressing.   Answered pt's and pt's family's questions and they had no further questions or concerns at end of session. Pt's  and daughter present and are very active in the care of the patient.    Patient left HOB elevated with all lines intact, call button in reach, RN notified and pt's family present.    GOALS:   Multidisciplinary Problems     Physical Therapy Goals     Not on file          Multidisciplinary Problems (Resolved)        Problem: Physical Therapy Goal    Goal Priority Disciplines Outcome Goal Variances Interventions   Physical Therapy Goal   (Resolved)     PT, PT/OT Outcome(s) achieved                     History:     Past Medical History:   Diagnosis Date    Anxiety     Chronic constipation     DDD (degenerative disc disease), lumbar     Fibroid tumor     Hyperplastic colon polyp     Osteopenia     Pes planus        Past Surgical History:   Procedure Laterality Date    BREAST BIOPSY      left breast    BREAST RECONSTRUCTION      Breast reduction  2004    TUBAL LIGATION         Clinical Decision Making:     History  Co-morbidities and personal factors that may impact the plan of care Examination  Body Structures and Functions, activity limitations and participation restrictions that may impact the plan of care Clinical Presentation   Decision Making/ Complexity Score   Co-morbidities:   [] Time since onset of injury / illness / exacerbation  [] Status of current condition  []Patient's cognitive status and safety concerns    [] Multiple Medical Problems (see med hx)  Personal Factors:   [] Patient's age  [] Prior Level of function   [] Patient's home situation (environment and family support)  [] Patient's level of motivation  [] Expected progression of patient      HISTORY:(criteria)    [x] 73564 - no personal factors/history    [] 25157 - has 1-2 personal factor/comorbidity     [] 86080 -  has >3 personal factor/comorbidity     Body Regions:  [] Objective examination findings  [] Head     []  Neck  [] Trunk   [] Upper Extremity  [x] Lower Extremity    Body Systems:  [] For communication ability, affect, cognition, language, and learning style: the assessment of the ability to make needs known, consciousness, orientation (person, place, and time), expected emotional /behavioral responses, and learning preferences (eg, learning barriers, education  needs)  [] For the neuromuscular system: a general assessment of gross coordinated movement (eg, balance, gait, locomotion, transfers, and transitions) and motor function  (motor control and motor learning)  [x] For the musculoskeletal system: the assessment of gross symmetry, gross range of motion, gross strength, height, and weight  [] For the integumentary system: the assessment of pliability(texture), presence of scar formation, skin color, and skin integrity  [] For cardiovascular/pulmonary system: the assessment of heart rate, respiratory rate, blood pressure, and edema     Activity limitations:    [] Patient's cognitive status and saf ety concerns          [] Status of current condition      [] Weight bearing restriction  [] Cardiopulmunary Restriction    Participation Restrictions:   [] Goals and goal agreement with the patient     [] Rehab potential (prognosis) and probable outcome      Examination of Body System: (criteria)    [x] 63004 - addressing 1-2 elements    [] 07408 - addressing a total of 3 or more elements     [] 06354 -  Addressing a total of 4 or more elements         Clinical Presentation: (criteria)  Stable - 28841     On examination of body system using standardized tests and measures patient presents with 1-2 elements from any of the following: body structures and functions, activity limitations, and/or participation restrictions.  Leading to a clinical presentation that is considered stable and/or  uncomplicated                              Clinical Decision Making  (Eval Complexity):  Low- 26002     Time Tracking:     PT Received On: 08/27/18  PT Start Time: 1327     PT Stop Time: 1353         2nd visit: 7609-1929  PT Total Time (min): 59 min with OT    Billable Minutes: Evaluation 10 and Gait Training 15      Carmelina Ruby, PT  08/27/2018

## 2018-08-27 NOTE — BRIEF OP NOTE
[facility] Brief Operative Note      Patient information:  Huber Pena   [id number]     Date of Surgery:  8/27/2018     Pre-op Diagnosis:   1. Left knee osteoarthritis       Post-op Diagnosis:   1. Left knee osteoarthritis    Procedure(s):   1. Left TKA    Anesthesia: General    Staff Attending/Surgeon: Santiago Lomax MD, present and scrubbed during all critical portions of the case.    Assistant Surgeon(s):   1. Ceasar Carlos MD   2. Ramiro Frost MD    Estimated Blood Loss: 100cc           Drain: None            Total IV Fluids: 1100 mL crystalloid           Specimens: Synovial fluid, synovium, bone    Implants:  See op report    Complications: None    Findings: consistent with diagnosis    Tourniquet time: 12 minutes           Disposition: awakened from anesthesia, extubated and taken to the recovery room in a stable condition, having suffered no apparent untoward event.           Condition: doing well without problems      Technique: See op report

## 2018-08-27 NOTE — OP NOTE
Procedure Note [employer] TKA:      DATE OF PROCEDURE:8/27/2018     PREOPERATIVE DIAGNOSIS: left knee bone-on-bone osteoarthritis.     POSTOPERATIVE DIAGNOSIS: left knee bone-on-bone osteoarthritis.     PROCEDURE: Computer-assisted left total knee arthroplasty with resurfaced patella.     ATTENDING SURGEON: Santiago Lomax M.D.   ASSISTANT: Dr. Frost, dr hirsch     Risk Adjustment: Please see media tab for any additional diagnoses faxed from my office related to theTKA.    COMPLICATIONS: None.     IMPLANTS:   1. [employer] U2 tibial tray, size } 3  2. [employer] U2 Femoral component, size 3 left  3. [employer] U2 all-poly patella, size 32    4. [employer] U2 posterior stabilized highly crosslinked  polyethylene, 9 mm height.   5. antibiotic bone cement x2     INDICATIONS FOR PROCEDURE: This is a 68 y.o. female with longstanding knee pain. They have failed nonoperative management including injections. Risks and benefits of total knee arthroplasty were explained to the patient. The patient agreed to move forward with total knee arthroplasty.     FINDINGS: The patient had a complete articular cartilage loss down to subchondral bone throughout the knee.     PROCEDURE IN DETAIL: The patient had adductor nerve block prior to entry to the OR. The patient was then brought to the Operating Room and spinal anesthesia started without any difficulties. The patient was placed supine on the operative table. Marmolejo catheter was then placed and removed at the end of the case. Right knee was then prepped and draped in sterile fashion. Prior to incision, proper site and procedure as well as antibiotic administration was verified. AFCN and ISN nerves were then injected with marcaine. Anterior midline incision was created overlying center patella proximally to the medial border of the tibial tubercle distally. Skin, subcutaneous fat and deep fascial layer were sharply incised until we came to extensor mechanism retinaculum. Flap was then elevated medially to  VMO and laterally to lateral border of patella. Knee was then aspirated and synovial fluid sent for cell count. Medial parapatellar arthrotomy was injected with Marcaine with epinephrine and a medial parapatellar arthrotomy was then created. Synovium overlying the anterolateral distal femur was then excised as well as the infrapatellar tendon fat pad. Sleeve of soft tissue was elevated off the proximal medial tibia. Periphery of the patella was denervated using bovie cautery, Hohmann retractors then placed medially and laterally along the distal femur to protect the collateral ligaments. ACL and anterior horn of lateral meniscus were then transected. We then pinned our navigation tracker on to distal femur and then performed our anatomy survey for the femur. We placed distal femoral cutting guide such that we were perpendicular to mechanical axis, aiming for about 1 degree of flexion and about 9 mm of bony resection. Distal femur was then resected. Next, we placed AP sizing guide on distal femur and measured for a size 3 femoral component. We then osiris out Whitesides line and placed our AP cutting block such that we were perpendicular to Whitesides line and created 2 pin holes in 5 degrees of external rotation relative to the posterior condylar axis.being parallel to transepicondylar axis and perpidicualar to yuliyas line. Resected posterior femoral bone measured approx. 5 mm in difference with the lateral piece thinner than the medial piece. Next, we subluxed the tibia forward and resected medial and lateral menisci. We then pinned our navigation tracker on to the proximal tibia and then performed our anatomy survey for tibia. We placed our proximal tibial cutting guide such that we were perpendicular to mechanical axis, aiming for about 4 to 5 mm of bony resection laterally and about 3 degrees posterior slope. Proximal tibial bone was then resected and detached from posterior soft tissues using Bovie cautery.  Next, with knee at 90 degrees, we placed a laminar  in lateral compartment and resected the ACL and the PCL as well as small osteophytes posteriorly along the femur. We then injected the posterior capsule with marcaine. We then assessed our gaps using a 9mm spacer block. With a  9 mm spacer block, the knee came out to full extension with nice stability with varus and valgus stress, and nice symmetry between flexion and extension. We assessed our tibial cut and our alignment charleen fell within the center of the ankle. Once we were happy with soft tissue sleeves and bony cuts, we then placed tibial protector on the proximal tibia and our chamfer cutting guide for the femur. We then created our chamfer cuts. We then placed our box cutting guide as lateral as possible while maintaining a symmetric condylar resection and reamed for our box cut. Next, we subluxed the tibia forward, and sized our proximal tibia for a size 3 baseplate, the center of which was rotated such that it was in line with medial third tibial tubercle. This was then pinned in place. We then trialed using a 9 trial polyethylene. With 9 mm polyethylene, the knee came out to full extension. We had nice stability with varus and valgus stress and nice symmetry between flexion and extension. Patella tracked centrally within trochlear groove. We then everted our patella and measured our patellar  thickness for 21 mm. We then resected down to approximately 12 mm of remaining bone. We then sized patella for a 32 patellar button. Three peg holes were then drilled for the patellar button and a patellar trial was then placed. We then assessed our tracking. Patella tracked centrally within trochlear groove. Once we were happy with all our trial components, we then removed all our trial components, except the tibial baseplate. We then reamed and broached for the tibial baseplate. We then drilled the sclerotic bone along the tibia using a short (4mm) drill bit  as well. We then placed the knee in extension and examined the posterior aspect of knee for bleeding. Once we achieve hemostasis, we then packed the knee and inflated the tourniquet. We then prepared our bony surfaces for cementation using pulse lavage antibiotic saline. Femoral component cement was then placed using a pressurized cement gun and then the femoral component impacted and all excess bony cement removed from the periphery and box. Cement gun was then used to cement the tibial bone and then the tibial component was impacted using direct impaction and all excess bony cement was removed from the periphery.We then placed a 9 mm final polyethylene, reduced the knee, everted the patella and cemented the patellar component on last. We then paused to allow for cement to harden. Once cement was hardened, we then let the tourniquet down and reexamined our gaps, stability and tracking; all of which remained unchanged. We then copiously irrigated the knee with pulse lavage betadine saline. We then closed our medial parapatellar arthrotomy with a running #2 Quill suture, subcutaneous deep fascial layer was closed with simple interrupted # 1 vicryl suture, subcutaneous skin with 2-0 Vicryl and incision with Dermabond and Steri-Strips, was then dressed with silver water proof dressing. The patient was then transferred to Recovery Room bed in stable condition.

## 2018-08-27 NOTE — DISCHARGE INSTRUCTIONS
ANESTHESIA  -For the first 24 hours after surgery:  Do not drive, use heavy equipment, make important decisions, or drink alcohol  -It is normal to feel sleepy for several hours.  Rest until you are more awake.  -Have someone stay with you, if needed.  They can watch for problems and help keep you safe.  -Some possible post anesthesia side effects include: nausea and vomiting, sore throat and hoarseness, sleepiness, and dizziness.    PAIN  -If you have pain after surgery, pain medicine will help you feel better.  Take it as directed, before pain becomes severe.  Most pain relievers taken by mouth need at least 20-30 minutes to start working.  -Do not drive or drink alcohol while taking pain medicine.  -Pain medication can upset your stomach.  Taking them with a little food may help.  -Other ways to help control pain: elevation, ice, and relaxation  -Call your surgeon if still having unmanageable pain an hour after taking pain medicine.  -Pain medicine can cause constipation.  Taking an over-the counter stool softener while on prescription pain medicine and drinking plenty of fluids can prevent this side effect.  -Call your surgeon if you have severe side effects like: breathing problems, trouble waking up, dizziness, confusion, or severe constipation.    NAUSEA  -Some people have nausea after surgery.  This is often because of anesthesia, pain, pain medicine, or the stress of surgery.  -Do not push yourself to eat.  Start off with clear liquids and soup.  Slowly move to solid foods.  Don't eat fatty, rich, spicy foods at first.  Eat smaller amounts.  -If you develop persistent nausea and vomiting please notify your surgeon immediately.    BLEEDING  -Different types of surgery require different types of care and dressing changes.  It is important to follow all instructions and advice from your surgeon.  Change dressing as directed.  Call your surgeon for any concerns regarding postop bleeding.    SIGNS OF  INFECTION  -Signs of infection include: fever, swelling, drainage, and redness  -Notify your surgeon if you have a fever of 100.4 F (38.0 C) or higher.  -Notify your surgeon if you notice redness, swelling, increased pain, pus, or a foul smell at the incision site.

## 2018-08-27 NOTE — PT/OT/SLP EVAL
Occupational Therapy   Evaluation and Discharge Note    Name: Huber Pena  MRN: [id number]  Admitting Diagnosis:  <principal problem not specified> Day of Surgery    Recommendations:     Discharge Recommendations: outpatient PT  Discharge Equipment Recommendations:  none  Barriers to discharge:  None    History:     Occupational Profile:  Living Environment: Pt lives w/spouse in [facility] [address], [employer], tub/shower  Previous level of function: indep  Roles and Routines:   Equipment Owned:  none  Assistance upon Discharge: family    Past Medical History:   Diagnosis Date    Anxiety     Chronic constipation     DDD (degenerative disc disease), lumbar     Fibroid tumor     Hyperplastic colon polyp     Osteopenia     Pes planus        Past Surgical History:   Procedure Laterality Date    BREAST BIOPSY      left breast    BREAST RECONSTRUCTION      Breast reduction  2004    TUBAL LIGATION         Subjective     Chief Complaint: knee pain  Patient/Family stated goals: return to PLOF  Communicated with: nurse prior to session.  Pain/Comfort:  · Pain Rating 1: 2/10  · Location - Side 1: Left  · Location - Orientation 1: generalized  · Location 1: knee  · Pain Addressed 1: Reposition, Pre-medicate for activity, Nurse notified, Cessation of Activity, Distraction  · Pain Rating Post-Intervention 1: 2/10    Patients cultural, spiritual, Yazidi conflicts given the current situation:      Objective:     Patient found with: blood pressure cuff, peripheral IV    General Precautions: Standard, fall   Orthopedic Precautions:LLE weight bearing as tolerated   Braces:       Occupational Performance:    Bed Mobility:    · Patient completed Rolling/Turning to Right with stand by assistance  · Patient completed Scooting/Bridging with stand by assistance  · Patient completed Supine to Sit with stand by assistance  · Patient completed Sit to Supine with stand by assistance    Functional Mobility/Transfers:  · Patient  "completed Sit <> Stand Transfer with stand by assistance  with  rolling walker   · Patient completed Toilet Transfer Step Transfer technique with stand by assistance with  rolling walker  · Functional Mobility: SBA-CGA w/RW--defer to PT    Activities of Daily Living:  · Upper Body Dressing: stand by assistance    · Lower Body Dressing: contact guard assistance    · Toileting: contact guard assistance      Cognitive/Visual Perceptual:  AO4    Physical Exam:  BUE AROM/strength WFL  Good sit balance, fair+ stand balance    Patient left HOB elevated with all lines intact, call button in reach, nurse notified and family present    [facility] 6 Click:  [facility] Total Score: 18    Treatment & Education:  Educated on role of OT/POC, Pt participating in initial OT/PT evaluations this date. Pt educated on adaptive dsg post surgery, home safety, car/toilet/shower/tub t/fs, use of DME for functional mobility and ADLs. Pt to attend OP PT tomorrow's date. D/c OT    Education:    Assessment:     Huber Pena is a 68 y.o. female with a medical diagnosis of <principal problem not specified>. At this time, patient is functioning at their prior level of function and does not require further acute OT services.     Clinical Decision Makin.  OT Low:  "Pt evaluation falls under low complexity for evaluation coding due to performance deficits noted in 1-3 areas as stated above and 0 co-morbities affecting current functional status. Data obtained from problem focused assessments. No modifications or assistance was required for completion of evaluation. Only brief occupational profile and history review completed."     Plan:     During this hospitalization, patient does not require further acute OT services.  Please re-consult if situation changes.    · Plan of Care Reviewed with: patient, family    This Plan of care has been discussed with the patient who was involved in its development and understands and is in agreement with the " identified goals and treatment plan    GOALS:   Multidisciplinary Problems     Occupational Therapy Goals     Not on file          Multidisciplinary Problems (Resolved)        Problem: Occupational Therapy Goal    Goal Priority Disciplines Outcome Interventions   Occupational Therapy Goal   (Resolved)     OT, PT/OT Outcome(s) achieved                    Time Tracking:     OT Date of Treatment: 08/27/18  OT Start Time: 1327  OT Stop Time: 1353  OT Total Time (min): 26 min  w/PT  & [id number] (40) w/PT  66 minutes total    Billable Minutes:Evaluation 15  Self Care/Home Management 30    Timur Milian OT  8/27/2018

## 2018-08-27 NOTE — ANESTHESIA PROCEDURE NOTES
Peripheral    Patient location during procedure: pre-op   Block not for primary anesthetic.  Reason for block: at surgeon's request and post-op pain management   Post-op Pain Location: left knee  Start time: 8/27/2018 10:19 AM  Timeout: 8/27/2018 10:18 AM   End time: 8/27/2018 10:30 AM  Staffing  Anesthesiologist: Kevin Caraballo MD  Resident/CRNA: Claudia Putnam MD  Performed: resident/CRNA   Preanesthetic Checklist  Completed: patient identified, site marked, surgical consent, pre-op evaluation, timeout performed, IV checked, risks and benefits discussed and monitors and equipment checked  Peripheral Block  Patient position: supine  Prep: ChloraPrep  Patient monitoring: heart rate, cardiac monitor, continuous pulse ox, continuous capnometry and frequent blood pressure checks  Block type: adductor canal, I PACK and lateral femoral cutaneous  Laterality: left  Injection technique: single shot  Needle  Needle type: Stimuplex   Needle gauge: 21 G  Needle length: 4 in  Needle localization: anatomical landmarks and ultrasound guidance   -ultrasound image captured on disc.  Assessment  Injection assessment: negative aspiration, negative parasthesia and local visualized surrounding nerve  Paresthesia pain: none  Heart rate change: no  Slow fractionated injection: yes  Additional Notes  VSS.  [facility] RN monitoring vitals throughout procedure.  Patient tolerated procedure well.    20 cc Ropivacaine 0.25% adductor canal, 10 cc IPACK, 10 cc LFCN

## 2018-08-27 NOTE — PLAN OF CARE
Pt's bp decreased to 99/58 while working with PT/OT. Pt complains of nausea. Pt assisted back to bed

## 2018-08-27 NOTE — ANESTHESIA RELEASE NOTE
"Anesthesia Release from PACU Note    Patient: Huber Pena    Procedure(s) Performed: Procedure(s) (LRB):  ARTHROPLASTY, KNEE, SIGHT ASSISTED (Left)    Anesthesia type: spinal    Post pain: Adequate analgesia    Post assessment: no apparent anesthetic complications    Last Vitals:   Visit Vitals  BP (!) 149/75   Pulse (!) 56   Temp 36.9 °C (98.4 °F) (Skin)   Resp 17   Ht 5' 6.5" (1.689 m)   Wt 79.8 kg (176 lb)   LMP  (LMP Unknown)   SpO2 100%   Breastfeeding? No   BMI 27.98 kg/m²       Post vital signs: stable    Level of consciousness: awake, alert  and oriented    Nausea/Vomiting: no nausea/no vomiting    Complications: none    Airway Patency: patent    Respiratory: unassisted    Cardiovascular: stable and blood pressure at baseline    Hydration: euvolemic  "

## 2018-08-27 NOTE — ANESTHESIA POSTPROCEDURE EVALUATION
"Anesthesia Post Evaluation    Patient: Huber Pena    Procedure(s) Performed: Procedure(s) (LRB):  ARTHROPLASTY, KNEE, SIGHT ASSISTED (Left)    Final Anesthesia Type: spinal  Patient location during evaluation: PACU  Patient participation: Yes- Able to Participate  Level of consciousness: awake and alert  Post-procedure vital signs: reviewed and stable  Pain management: adequate  Airway patency: patent  PONV status at discharge: No PONV  Anesthetic complications: no      Cardiovascular status: blood pressure returned to baseline  Respiratory status: unassisted  Hydration status: euvolemic          Visit Vitals  BP (!) 149/75   Pulse (!) 56   Temp 36.9 °C (98.4 °F) (Skin)   Resp 17   Ht 5' 6.5" (1.689 m)   Wt 79.8 kg (176 lb)   LMP  (LMP Unknown)   SpO2 100%   Breastfeeding? No   BMI 27.98 kg/m²       Pain/Phil Score: Pain Assessment Performed: Yes (8/27/2018 11:00 AM)  Presence of Pain: complains of pain/discomfort (8/27/2018 11:00 AM)  Pain Rating Prior to Med Admin: 0 (8/27/2018  7:12 AM)  Phil Score: 10 (8/27/2018 10:40 AM)        "

## 2018-08-27 NOTE — ANESTHESIA PROCEDURE NOTES
Spinal    Diagnosis: R knee  Patient location during procedure: OR  Start time: 8/27/2018 7:46 AM  Timeout: 8/27/2018 7:45 AM  End time: 8/27/2018 7:48 AM  Staffing  Anesthesiologist: Kevin Caraballo MD  Resident/CRNA: Mo Bauer MD  Performed: resident/CRNA   Preanesthetic Checklist  Completed: patient identified, site marked, surgical consent, pre-op evaluation, timeout performed, IV checked, risks and benefits discussed and monitors and equipment checked  Spinal Block  Patient position: sitting  Prep: ChloraPrep  Patient monitoring: heart rate, cardiac monitor and continuous pulse ox  Approach: midline  Location: L3-4  Injection technique: single shot  CSF Fluid: clear free-flowing CSF  Needle  Needle type: pencil-tip   Needle gauge: 22 G  Needle length: 3.5 in  Needle localization: anatomical landmarks  Assessment  Ease of block: easy  Patient's tolerance of the procedure: comfortable throughout block

## 2018-08-27 NOTE — PLAN OF CARE
Follow-up With  Details  Why  Contact Info   Santiago Lomax MD  On 9/11/2018  @11a  [address] [facility]  [address]  [place] [address]  [phone]   [facility]    Outpatient Therapy  [address] [place] [address]  [phone]        08/27/18 1109   Discharge Assessment   Assessment Type Discharge Planning Assessment     Patient known to [place]. DME in place. Plans to d/c today after PT/OT  Session with family.  Follow up scheduled. [facility]   Bedside delivery to be used for new prescriptions.     Future Appointments   Date Time Provider Department Center   8/28/2018  3:00 PM Gail Davies, PT [facility] [place]   8/29/2018 11:00 AM Rosalina Avina, PT [facility] [place]   8/31/2018 11:00 AM Lukas Kumar, PT [facility] [place]   9/4/2018 11:00 AM Lukas Kumar PT [facility] [place]   9/5/2018 11:00 AM Gail Davies, PT [facility] [place]   9/6/2018 11:00 AM Lukas Kumar, PT [facility] [place]   9/7/2018 11:00 AM Rosalina Avina, PT [facility] [place]   9/10/2018 12:00 PM Gail Davies PT [facility] [place]   9/12/2018 11:00 AM Gail Davies PT [facility] [place]   9/14/2018 11:00 AM Gail Davies PT [facility] [place]   9/17/2018 11:00 AM Lukas Kumar, PT [facility] [place]   9/19/2018 11:00 AM Gail Davies, PT [facility] [place]   9/21/2018 11:00 AM Gail Davies, PT [facility] [place]   9/24/2018 11:00 AM Gail Davies PT [facility] [place]   9/26/2018 11:00 AM Gail Davies PT [facility] [place]   10/2/2018 11:00 AM Gail Davies PT [facility] [place]   10/5/2018 11:00 AM KAYLEE Robles [facility] [place]

## 2018-08-27 NOTE — TRANSFER OF CARE
"Anesthesia Transfer of Care Note    Patient: Huber Pena    Procedure(s) Performed: Procedure(s) (LRB):  ARTHROPLASTY, KNEE, SIGHT ASSISTED (Left)    Patient location: PACU    Anesthesia Type: spinal and MAC    Transport from OR: Transported from OR on 6-10 L/min O2 by face mask with adequate spontaneous ventilation. Upon arrival to PACU/ICU, patient attached to 100% O2 by T-piece with adequate spontaneous ventilation    Post pain: adequate analgesia    Post assessment: no apparent anesthetic complications    Post vital signs: stable    Level of consciousness: awake and alert    Nausea/Vomiting: no nausea/vomiting    Complications: none    Transfer of care protocol was followed      Last vitals:   Visit Vitals  /68   Pulse (!) 54   Temp 36.9 °C (98.4 °F) (Skin)   Resp 15   Ht 5' 6.5" (1.689 m)   Wt 79.8 kg (176 lb)   LMP  (LMP Unknown)   SpO2 95%   Breastfeeding? No   BMI 27.98 kg/m²     "

## 2018-08-27 NOTE — PLAN OF CARE
"VSS. Pt resting, easily arousable. When she wakes up, she c/o pain 1/10, but falls back asleep. Moves legs bilaterally. Able to lift Right leg off of bed, but not left. "ok to release to room", per Dr Reyes. Report called to SKYLER Paiz, with time allotted for questions.   "

## 2018-08-28 ENCOUNTER — NURSE TRIAGE (OUTPATIENT)
Dept: ADMINISTRATIVE | Facility: CLINIC | Age: 68
End: 2018-08-28

## 2018-08-28 ENCOUNTER — CLINICAL SUPPORT (OUTPATIENT)
Dept: REHABILITATION | Facility: HOSPITAL | Age: 68
End: 2018-08-28
Payer: COMMERCIAL

## 2018-08-28 DIAGNOSIS — R26.89 IMPAIRED GAIT AND MOBILITY: ICD-10-CM

## 2018-08-28 DIAGNOSIS — G89.29 CHRONIC PAIN OF LEFT KNEE: ICD-10-CM

## 2018-08-28 DIAGNOSIS — M25.662 DECREASED ROM OF LEFT KNEE: ICD-10-CM

## 2018-08-28 DIAGNOSIS — R29.898 DECREASED STRENGTH OF LOWER EXTREMITY: ICD-10-CM

## 2018-08-28 DIAGNOSIS — M25.562 CHRONIC PAIN OF LEFT KNEE: ICD-10-CM

## 2018-08-28 PROCEDURE — 97161 PT EVAL LOW COMPLEX 20 MIN: CPT | Mod: PN

## 2018-08-28 PROCEDURE — 97110 THERAPEUTIC EXERCISES: CPT | Mod: PN

## 2018-08-28 NOTE — TELEPHONE ENCOUNTER
Reason for Disposition   [1] SEVERE post-op pain (e.g., excruciating, pain scale 8-10) AND [2] not controlled with pain medications    Protocols used: ST POST-OP SYMPTOMS AND CCOMOPTTC-T-YS    Pt daughter reports pain 10/10. States that pt took 1 tablet of norco at 2:30 am and motrin at 7 am with no relief. While paging MD, daughter states pt took 1 tablet of norco. Spoke with Dr Maloney who states Dr Lomax is [facility] and pt needs to call [facility]. Daughter notified and informed message would be sent to Dr Lomax. States pain is slightly improved by norco taken at 7:40 am.

## 2018-08-28 NOTE — PLAN OF CARE
TIME RECORD    Date: 8/28/2018    Start Time:  3:05 pm   Stop Time:  3:49 pm     PROCEDURES:    TIMED  Procedure Min.   TE 15'                      UNTIMED  Procedure Min.   IE 29'         Total Timed Minutes:  15'   Total Timed Units:  1  Total Untimed Units:  1  Charges Billed/# of units:  2 ( 1 IE, 1 TE)     [facility]    PHYSICAL THERAPY EVALUATION  Onset Date: s/p (L) TKA on 8/27/18  Medical Diagnosis: left knee pain  Treatment Diagnosis:   1. Chronic pain of left knee     2. Decreased ROM of left knee     3. Decreased strength of lower extremity     4. Impaired gait and mobility       Physician: Santiago Lomax MD  Past Medical History:   Diagnosis Date    Anxiety     Chronic constipation     DDD (degenerative disc disease), lumbar     Fibroid tumor     Hyperplastic colon polyp     Osteopenia     Pes planus      Precautions: WBAT on (L) LE, osteopenia, hx of chest pain, hx of chest pain, hx of fainting/dizziness standard  Prior Therapy: yes - acute care  Medications: Huber Pena has a current medication list which includes the following prescription(s): alprazolam, aspirin, cephalexin, docusate sodium, dorzolamide-timolol 2-0.5%, famotidine, fluticasone, hydrocodone-acetaminophen, meclizine, meloxicam, and nystatin.  Nutrition:  Overweight  History of Present Illness: s/p (L) TKA on 8/27/18  Prior Level of Function: Independent  Social History: Pt stated that she lives with her , who is able to assist pt as needed.   Place of Residence (Steps/Adaptations): Pt stated that she has 6 steps with (B) handrail to enter [facility]. Pt stated that can't use both handrails at the same. 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   Functional Deficits Leading to Referral/Nature of Injury: decreased functional mobility, difficulty performing transfers, difficulty ambulating  Patient Therapy Goals: decrease pain, improve mobility, normal use of knee    Subjective      Huber Pena states that she underwent (L) TKA on 18. Pt stated that she has a BSC at home.     Pain:  Location: (L) knee   Description: Aching  Activities Which Increase Pain: Standing, Walking and Getting out of bed/chair, sitting   Activities Which Decrease Pain: pain medication, ice, rest and elevation  Pain Scale: 3/10 at best 5/10 now  10/10 at worst    Objective       Range of Motion/Strength:    AROM: 8- 55 degrees  PROM: 5 - 65 degrees     L/E Strength w/ MicroFET Muscle Luther Dynamometer Right Left Pain/Dysfunction with Movement   (approx 4 sec hold w/ max contraction)   Hip Flexion 11.9 kg  2.1 kg     Hip Abduction 8.9 kg  3.6 kg     Quadriceps 11.3 kg  3.8 kg     Hamstrings 12.2 kg  5.7 kg       TU seconds (w/RW assistive device)  30 second sit-to-stand test (without U/E support): 0; 5 w/ UE support   KOOS Knee Survey:    Symptoms:   Pain:  33/36  Functional, Daily Livin/68 (89.7% disabled)  Function, Sports and Recreational Activities:    Quality of Life:    PROMIS-29:    Physical Function:    Anxiety:    Depression:     Fatigue:     Sleep Disturbance:     Satisfaction with Social Role:     Pain Interference:     Pain Intensity:  8/10    Flexibility: decreased (L) hamstring and gastroc flexibility   Gait: With AD.  Device Used -  Rolling walker  Analysis: Pt demo antalgic gait with decreased dani, decreased (B) step length, decreased (L) TKE in stance, decreased stance time on (L) LE  Bed Mobility:Assistance - Min A   Transfers: Assistive Device  Special Tests: see above  Other: n/a     Pt declined to practice ascending/descending stairs in clinic and stated that she feels comfortable ascending/descending steps to enter her home. Pt was able to accurately verbalize proper sequence for ascending/descending stairs.     CMS Impairment/Limitation/Restriction for FOTO knee Survey    Therapist reviewed FOTO scores for Huber Pena  "on 8/28/2018.   FOTO documents entered into EPIC - see Media section.    Limitation Score: 99%         TREATMENT     Time In: 3:34 pm   Time Out: 3:49 pm     PT Evaluation Completed? Yes  Discussed Plan of Care with patient: Yes    Huber received 15 minutes of therapeutic exercise & instruction including:    Date  8/28/18   VISIT 1   FOTO 1/5   MT    Long Sit HS Seated with foot propped on chair 2'    Gastroc Str.    Bike      QS 5"x15   SAQ    SLR    SL Abd    Heel Slides    Rox Hip Add    LAQs 2x10    Seated Hip Flex 2x10    Cybex   Leg Press    TKE    Hip Abd    Hip Flex    Hip Ext    HS Curls    Knee Ext    Step Ups    Step Downs    Gait    CP    Initials CK       Leri received 0 minutes of manual therapy including:      Written Home Exercises Provided: yes - see media section   Leri demo good understanding of the education provided. Patient demo good return demo of skill of exercises.    See EMR in Patient Instructions for HEP given: No    Pt and pt's family members were educated on icing and elevating (L) LE, to not take a bath/soak (L) LE, and to wear TATIANA hose at all times. Pt and pt's family members verbalized understanding.     Assessment       Initial Assessment (Pertinent finding, problem list and factors affecting outcome): Ms. Pena is a 68 year old female s/p (L) TKA on 8/27/18. Pt presents with c/o pain in (L) knee, decreased (L) knee ROM, decreased LE strength/flexibility, impaired gait/mobility, and decreased functional mobility. Pt will benefit from skilled PT to address the impairments listed above in order to return pt to her highest level of function with decreased pain and limitation.       History  Co-morbidities and personal factors that may impact the plan of care Co-morbidities:   osteopenia, hx of chest pain, hx of fainting/dizziness      Personal Factors:   no deficits     high   Examination  Body Structures and Functions, activity limitations and participation restrictions that may " impact the plan of care Body Regions:   lower extremities    Body Systems:    ROM  strength  gait  transfers    Participation Restrictions:   Not currently able to work as a teacher    Activity limitations:   Learning and applying knowledge  no deficits    General Tasks and Commands  no deficits    Communication  no deficits    Mobility  walking    Self care  no deficits    Domestic Life  doing house work (cleaning house, washing dishes, laundry)    Interactions/Relationships  no deficits    Life Areas  no deficits    Community and Social Life  no deficits         high   Clinical Presentation stable and uncomplicated low   Decision Making/ Complexity Score: low       Rehab Potiential: good  Spiritual/Cultural Needs: no  Barriers to Rehab: no  Short Term Goals:    1. Pt will be independent with HEP  2. Pt will improve (L) LE strength to at least 75% of (R) LE strength as measured via MicroFet handheld dynamometer in order to improve functional mobility  3. Pt will improve (L) knee AROM to at least 5-100 degrees in order to improve gait    Long Term Goals:  1. Pt will be independent with updated HEP  2. Pt will improve (L) LE strength to at least 90% of (R) LE strength as measured via MicroFet handheld dynamometer in order to improve functional mobility  3. Pt will improve (L) knee AROM to at least 2-120 degrees in order to improve gait and ability to perform ADLs  4. Pt will improve FOTO knee survey score to </= 45% limited in order to demo improved functional mobility  5. Pt will improve score on Function,Daily Living section of KOOS to at least 31/68 ( 45% limited) in order to demo improved functional mobility  6. Pt will perform TUG in < 10 seconds without AD in order to demo improved gait speed  7. Pt will perform at least 10 sit to stands without UE support on 30 second sit to stand test in order to demo improved ability to perform transfers        Plan     Certification Period: 8/28/18 to 10/9/18  Recommended  Treatment Plan: 3 times per week for 6 weeks: Electrical Stimulation Russian/NMES, Gait Training, Manual Therapy, Moist Heat/ Ice, Neuromuscular Re-ed, Patient Education, Self Care, Therapeutic Activites and Therapeutic Exercise  Other Recommendations: modalities prn, ASTYM prn, kinesiotape prn, Functional Dry Needling prn       Gail Davies, PT  8/28/2018      I CERTIFY THE NEED FOR THESE SERVICES FURNISHED UNDER THIS PLAN OF TREATMENT AND WHILE UNDER MY CARE    Physician's comments: ________________________________________________________________________________________________________________________________________________      Physician's Name: ___________________________________

## 2018-08-29 ENCOUNTER — CLINICAL SUPPORT (OUTPATIENT)
Dept: REHABILITATION | Facility: HOSPITAL | Age: 68
End: 2018-08-29
Payer: COMMERCIAL

## 2018-08-29 DIAGNOSIS — R26.89 IMPAIRED GAIT AND MOBILITY: ICD-10-CM

## 2018-08-29 DIAGNOSIS — G89.29 CHRONIC PAIN OF LEFT KNEE: ICD-10-CM

## 2018-08-29 DIAGNOSIS — R29.898 DECREASED STRENGTH OF LOWER EXTREMITY: ICD-10-CM

## 2018-08-29 DIAGNOSIS — M25.662 DECREASED ROM OF LEFT KNEE: ICD-10-CM

## 2018-08-29 DIAGNOSIS — M25.562 CHRONIC PAIN OF LEFT KNEE: ICD-10-CM

## 2018-08-29 PROCEDURE — 97140 MANUAL THERAPY 1/> REGIONS: CPT | Mod: PN

## 2018-08-29 PROCEDURE — 97110 THERAPEUTIC EXERCISES: CPT | Mod: PN

## 2018-08-29 NOTE — PROGRESS NOTES
"DAILY TREATMENT NOTE    DATE: 8/29/2018    Start Time:  1100  Stop Time:  1150    PROCEDURES:    TIMED  Procedure Min.   Manual therapy 15'   Therex 30         Total Timed Minutes:  45  Total Timed Units:  3  Total Untimed Units:  0  Charges Billed/# of units:  3 MTx1. TEx2      Progress/Current Status    Subjective:     Patient ID: Huber Pena is a 68 y.o. female.  Diagnosis:   1. Chronic pain of left knee     2. Decreased ROM of left knee     3. Decreased strength of lower extremity     4. Impaired gait and mobility       Pain: 2 /10  States she took pain meds about 9 am.    Objective:     Manual therapy provided x 15 minutes including retrograde edema reduction massage, STM/MFR Distal quad and ITB, posterior knee focus distal area, manual hamstring stretching and gentle PROM into flexion and extension to tolerance. Patient then instructed in and performed therapeutic exercise all as per log with 1:1 by PTA x 30 minutes.  Assist as noted.     Date  8/29/18 8/28/18   VISIT 2 1   FOTO 2/5 1/5   MT 15     Long Sit HS Seated with foot propped on chair 2'  Seated with foot propped on chair 2'    Gast[address] Str[address] Sit 30"x3 w/strap     Bike --        QS 5"x15 L 5"x15   SAQ 2x10 B Debbie     SLR 2x10 L mod/Debbie     SL Abd 2x10 L     Heel Slides NT     Rox Hip Add 5"x10     LAQs 2x10 B 2x10    Seated Hip Flex 2x10 B 2x10    Cybex   Leg Press --     TKE --     Hip Abd --     Hip Flex --     Hip Ext --     HS Curls --     Knee Ext --     Step Ups --     Step Downs --     Gait --     CP Home     Initials  1/6 CK          Assessment:     Patient able to increase activity with added therex, mod/min assist as noted with SLR and min A with SAQ on L.  Able to complete all therex with minimal complaint of "it hurts me a little more now"  No specific to scale.      Patient Education/Response:     Patient educated to continue HEP.  Verbalized understanding.    Plans and Goals:      Continue PT per POC, progress as able.    1. Pt " will be independent with HEP  2. Pt will improve (L) LE strength to at least 75% of (R) LE strength as measured via MicroFet handheld dynamometer in order to improve functional mobility  3. Pt will improve (L) knee AROM to at least 5-100 degrees in order to improve gait     Long Term Goals:  1. Pt will be independent with updated HEP  2. Pt will improve (L) LE strength to at least 90% of (R) LE strength as measured via MicroFet handheld dynamometer in order to improve functional mobility  3. Pt will improve (L) knee AROM to at least 2-120 degrees in order to improve gait and ability to perform ADLs  4. Pt will improve FOTO knee survey score to </= 45% limited in order to demo improved functional mobility  5. Pt will improve score on Function,Daily Living section of KOOS to at least 31/68 ( 45% limited) in order to demo improved functional mobility  6. Pt will perform TUG in < 10 seconds without AD in order to demo improved gait speed  7. Pt will perform at least 10 sit to stands without UE support on 30 second sit to stand test in order to demo improved ability to perform transfers

## 2018-08-31 ENCOUNTER — CLINICAL SUPPORT (OUTPATIENT)
Dept: REHABILITATION | Facility: HOSPITAL | Age: 68
End: 2018-08-31
Payer: COMMERCIAL

## 2018-08-31 DIAGNOSIS — R29.898 DECREASED STRENGTH OF LOWER EXTREMITY: ICD-10-CM

## 2018-08-31 DIAGNOSIS — M25.562 CHRONIC PAIN OF LEFT KNEE: ICD-10-CM

## 2018-08-31 DIAGNOSIS — G89.29 CHRONIC PAIN OF LEFT KNEE: ICD-10-CM

## 2018-08-31 DIAGNOSIS — R26.89 IMPAIRED GAIT AND MOBILITY: ICD-10-CM

## 2018-08-31 DIAGNOSIS — M25.662 DECREASED ROM OF LEFT KNEE: ICD-10-CM

## 2018-08-31 PROCEDURE — 97110 THERAPEUTIC EXERCISES: CPT | Mod: PN

## 2018-08-31 PROCEDURE — 97140 MANUAL THERAPY 1/> REGIONS: CPT | Mod: PN

## 2018-08-31 NOTE — PROGRESS NOTES
"DAILY TREATMENT NOTE    DATE: 8/31/2018    Start Time:  1100  Stop Time:  1200    PROCEDURES:    TIMED  Procedure Min.   Manual therapy 15'   Therex 35         Total Timed Minutes:  50  Total Timed Units:  3  Total Untimed Units:  0  Charges Billed/# of units:  2 TE, 1 MT      Progress/Current Status    Subjective:     Patient ID: Huber Pena is a 68 y.o. female.  Diagnosis:   1. Chronic pain of left knee     2. Decreased ROM of left knee     3. Decreased strength of lower extremity     4. Impaired gait and mobility       Mrs. Pena presents to PT today moving slowly and ambulating with a RW.  She is concerned that she has 'a lot of swelling' in her surgical leg.  She has stopped taking her pain meds because she states that she 'doesn't want to get addicted'.   Pain: 7 /10      Objective:   O:  L knee extension PROM = (+) 2 degrees.  2+ to 3+ L leg pitting edema.  Unable to perform a full quad set.      Manual therapy provided x 15 minutes including retrograde edema reduction massage, STM/MFR Distal quad and ITB, posterior knee focus distal area, manual hamstring stretching and gentle PROM into flexion and extension to tolerance.  Also provided dressing change.  All steri-strips in place. Re-dressed with abd pad and ACE bandage.      Patient performed therapeutic exercise all as per log with 1:1 by PT x 35 minutes.  Assist as noted.     Date  08/31/2018 8/29/18 8/28/18   VISIT  2 1   FOTO 3/5 2/5 1/5   MT 15' 15     Long Sit HS  Seated with foot propped on chair 2'  Seated with foot propped on chair 2'    [address] Str[address]  Sit 30"x3 w/strap     Bike  --        QS 2x10 (cueing) 5"x15 L 5"x15   SAQ 2x10 AAROM 2x10 B Debbie     SLR 3x10 AAROM 2x10 L mod/Debbie     SL Abd  2x10 L     Heel Slides  NT     Rox Hip Add  5"x10     LAQs 3x10 AAROM 2x10 B 2x10    Seated Hip Flex 2x10  B 2x10 B 2x10    Cybex   Leg Press  --     TKE  --     Hip Abd  --     Hip Flex  --     Hip Ext  --     HS Curls  --     Knee Ext  --     Step " Ups  --     Step Downs  --     Gait  --     CP 10' Home     Initials  GRAYSON 1/6 CK          Assessment:   S/p L TKA; POD #4.  L knee post-op effusion with LLE pitting edema.  No signs of infection or DVT at this time.  Improved edema post-therex after elevating and icing.      Patient Education/Response:   Patient educated to continue HEP.  Verbalized understanding.    Plans and Goals:    Continue PT per POC, progress as able.    1. Pt will be independent with HEP  2. Pt will improve (L) LE strength to at least 75% of (R) LE strength as measured via MicroFet handheld dynamometer in order to improve functional mobility  3. Pt will improve (L) knee AROM to at least 5-100 degrees in order to improve gait     Long Term Goals:  1. Pt will be independent with updated HEP  2. Pt will improve (L) LE strength to at least 90% of (R) LE strength as measured via MicroFet handheld dynamometer in order to improve functional mobility  3. Pt will improve (L) knee AROM to at least 2-120 degrees in order to improve gait and ability to perform ADLs  4. Pt will improve FOTO knee survey score to </= 45% limited in order to demo improved functional mobility  5. Pt will improve score on Function,Daily Living section of KOOS to at least 31/68 ( 45% limited) in order to demo improved functional mobility  6. Pt will perform TUG in < 10 seconds without AD in order to demo improved gait speed  7. Pt will perform at least 10 sit to stands without UE support on 30 second sit to stand test in order to demo improved ability to perform transfers

## 2018-09-03 ENCOUNTER — NURSE TRIAGE (OUTPATIENT)
Dept: ADMINISTRATIVE | Facility: CLINIC | Age: 68
End: 2018-09-03

## 2018-09-04 ENCOUNTER — CLINICAL SUPPORT (OUTPATIENT)
Dept: REHABILITATION | Facility: HOSPITAL | Age: 68
End: 2018-09-04
Payer: COMMERCIAL

## 2018-09-04 DIAGNOSIS — G89.29 CHRONIC PAIN OF LEFT KNEE: ICD-10-CM

## 2018-09-04 DIAGNOSIS — R29.898 DECREASED STRENGTH OF LOWER EXTREMITY: ICD-10-CM

## 2018-09-04 DIAGNOSIS — R26.89 IMPAIRED GAIT AND MOBILITY: ICD-10-CM

## 2018-09-04 DIAGNOSIS — M25.662 DECREASED ROM OF LEFT KNEE: ICD-10-CM

## 2018-09-04 DIAGNOSIS — M25.562 CHRONIC PAIN OF LEFT KNEE: ICD-10-CM

## 2018-09-04 PROCEDURE — 97140 MANUAL THERAPY 1/> REGIONS: CPT | Mod: PN

## 2018-09-04 PROCEDURE — 97110 THERAPEUTIC EXERCISES: CPT | Mod: PN

## 2018-09-04 NOTE — TELEPHONE ENCOUNTER
Reason for Disposition   Caller has NON-URGENT question and triager unable to answer question    Protocols used: ST POST-OP SYMPTOMS AND [id number]    Huber reporting she is having some swelling to her knee post operatively that has been present since surgery. She states it is about the same as it has been since the procedure. She says when she exercises it swells and when she elevates it, the swelling goes down. She denies redness, fever, increasing pain, or drainage to incision site. She states she doesn't really have to take pain medication as much during the day anymore. Advised to monitor swelling and call for increases in swelling, pain, any new onset of redness or fever. Advised to call to schedule her f/u with surgery in the morning. She agrees to plan. Please contact caller with any further care advice.

## 2018-09-04 NOTE — PROGRESS NOTES
"DAILY TREATMENT NOTE    DATE: 9/4/2018    Start Time:  11:00 am   Stop Time:  12:10 pm     PROCEDURES:    TIMED  Procedure Min.   TE supervised 15' NC   TE 35'    MT 10'              UNTIMED  Procedure Min.   CP 10'          Total Timed Minutes:  45'   Total Timed Units:  3  Total Untimed Units:  0  Charges Billed/# of units:  3 ( 2 TE, 1 MT)       Progress/Current Status    Subjective:     Patient ID: Huber Pena is a 68 y.o. female.  Diagnosis:   1. Chronic pain of left knee     2. Decreased ROM of left knee     3. Decreased strength of lower extremity     4. Impaired gait and mobility       Pain: 3 /10  Pt stated that her (L) LE slipped yesterday when putting pants on, but stated that she did not fall. Pt stated that she experienced pain in (L) knee when slip happened but does not feel like her (L) knee pain has been worse overall since slip.     Objective:     Pt initiated therapy session on stationary bike x 5' supervised. Objective measurements were taken and pt participated in therex per log below 1:1 with PT x 35' and supervised therex x 10'. Manual therapy provided x 10 minutes including retrograde edema reduction, STM/MFR Distal quad and ITB, STM to (L) hamstring.            Date  9/4/18 08/31/2018 8/29/18 8/28/18   VISIT   2 1   FOTO 4/5 3/5 2/5 1/5   MT 10'  15' 15     Long Sit HS 2' seated with foot propped  Seated with foot propped on chair 2'  Seated with foot propped on chair 2'    [address] Str[address]   Sit 30"x3 w/strap     Bike 5' partial revs  --        QS 5"x20 2x10 (cueing) 5"x15 L 5"x15   SAQ 2x10 AAROM 2x10 AAROM 2x10 B Debbie     SLR 2x10 AAROM 3x10 AAROM 2x10 L mod/Debbie     SL Abd 2x10  2x10 L     Heel Slides 10"x10 w/straps  NT     Rox Hip Add   5"x10     LAQs 2x15 w/assist 3x10 AAROM 2x10 B 2x10    Seated Hip Flex 2x15 B 2x10  B 2x10 B 2x10    Cybex   Leg Press   --     TKE   --     Hip Abd   --     Hip Flex   --     Hip Ext   --     HS Curls   --     Knee Ext   --     Step Ups   --     Step " Downs   --     Gait   --     CP 10'  10' Home     Initials CK JH DH 1/6 CK     Range of Motion/Strength:    AROM: 3- 83 degrees     L/E Strength w/ MicroFET Muscle Luther Dynamometer Right Left Pain/Dysfunction with Movement   (approx 4 sec hold w/ max contraction)   Hip Flexion 12.7 kg  5.7 kg      Hip Abduction 7.3 kg  9.0 kg      Quadriceps 7.8 kg  3.6 kg      Hamstrings 11.5 kg  7.4 kg              Assessment:     Pt demo improved (L) knee extension and flexion AROM compared to measurements taken last week. Pt does present with significant (L) quad weakness and may benefit from Tristanian Estim next visit. Pt tolerated therapy session without any adverse reactions.     Patient Education/Response:     Continue with HEP. Pt was educated on and given handout on additional HEP consisting of heel slides. Pt demo/verbalized understanding.     Plans and Goals:     Continue PT per POC, progress as able.    1. Pt will be independent with HEP  2. Pt will improve (L) LE strength to at least 75% of (R) LE strength as measured via MicroFet handheld dynamometer in order to improve functional mobility  3. Pt will improve (L) knee AROM to at least 5-100 degrees in order to improve gait     Long Term Goals:  1. Pt will be independent with updated HEP  2. Pt will improve (L) LE strength to at least 90% of (R) LE strength as measured via MicroFet handheld dynamometer in order to improve functional mobility  3. Pt will improve (L) knee AROM to at least 2-120 degrees in order to improve gait and ability to perform ADLs  4. Pt will improve FOTO knee survey score to </= 45% limited in order to demo improved functional mobility  5. Pt will improve score on Function,Daily Living section of KOOS to at least 31/68 ( 45% limited) in order to demo improved functional mobility  6. Pt will perform TUG in < 10 seconds without AD in order to demo improved gait speed  7. Pt will perform at least 10 sit to stands without UE support on 30 second sit  to stand test in order to demo improved ability to perform transfers

## 2018-09-05 ENCOUNTER — CLINICAL SUPPORT (OUTPATIENT)
Dept: REHABILITATION | Facility: HOSPITAL | Age: 68
End: 2018-09-05
Payer: COMMERCIAL

## 2018-09-05 DIAGNOSIS — M25.562 CHRONIC PAIN OF LEFT KNEE: ICD-10-CM

## 2018-09-05 DIAGNOSIS — R26.89 IMPAIRED GAIT AND MOBILITY: ICD-10-CM

## 2018-09-05 DIAGNOSIS — G89.29 CHRONIC PAIN OF LEFT KNEE: ICD-10-CM

## 2018-09-05 DIAGNOSIS — R29.898 DECREASED STRENGTH OF LOWER EXTREMITY: ICD-10-CM

## 2018-09-05 DIAGNOSIS — M25.662 DECREASED ROM OF LEFT KNEE: ICD-10-CM

## 2018-09-05 PROCEDURE — 97140 MANUAL THERAPY 1/> REGIONS: CPT | Mod: PN

## 2018-09-05 PROCEDURE — 97014 ELECTRIC STIMULATION THERAPY: CPT | Mod: PN

## 2018-09-05 PROCEDURE — 97110 THERAPEUTIC EXERCISES: CPT | Mod: PN

## 2018-09-05 NOTE — PROGRESS NOTES
"DAILY TREATMENT NOTE    DATE: 9/5/2018    Start Time:  1100  Stop Time:  1210    PROCEDURES:    TIMED  Procedure Min.   Manual therapy 15   Therex 40                 UNTIMED  Procedure Min.   Bike 5   Estim 5   Cold pack 10     Total Timed Minutes:  55  Total Timed Units:  4  Total Untimed Units:  1  Charges Billed/# of units:  MTx1, TE x 3, Estim x 1      Progress/Current Status    Subjective:     Patient ID: Huber Pena is a 68 y.o. female.  Diagnosis:   1. Chronic pain of left knee     2. Decreased ROM of left knee     3. Decreased strength of lower extremity     4. Impaired gait and mobility       Pain: 3 /10  Patient reports increased pain after yesterday's visit.      Objective:     Pt initiated therapy session on stationary bike x 5' supervised. Patient participated in therex per log below 1:1 with PTA x 35' including trial South African estim 5"on/5" off at 80 bps with 2 " ramp to Left quad x 5 minutes while performed quad sets.  Manual therapy provided x 15 minutes including retrograde edema reduction, STM/MFR Distal quad and ITB, STM to (L) hamstring.   Cold pack x 10 minutes to anterior/posterior left knee while propped on stool to increase knee extension.  Completed FOTO.       Date  9/05/18 9/4/18 08/31/2018 8/29/18 8/28/18   VISIT    2 1   FOTO 5/5 DONE 4/5 3/5 2/5 1/5   MT 15' 10'  15' 15     Long Sit HS Seated with foot propped on chair 2'  2' seated with foot propped  Seated with foot propped on chair 2'  Seated with foot propped on chair 2'    [address] Str[address]    Sit 30"x3 w/strap     Bike 5' partial revs 5' partial revs  --        QS 5"x20 + South African x 5' 5"x20 2x10 (cueing) 5"x15 L 5"x15   SAQ  2x10 AAROM 2x10 AAROM 2x10 B Debbie     SLR 2x10 AAROM L 2x10 AAROM 3x10 AAROM 2x10 L mod/Debbie     SL Abd 2x12 L 2x10  2x10 L     Heel Slides 10"x10 w/straps 10"x10 w/straps  NT     Rox Hip Add    5"x10     LAQs 2x15 w/assist 2x15 w/assist 3x10 AAROM 2x10 B 2x10    Seated Hip Flex 2x15 B 2x15 B 2x10  B 2x10 B 2x10 " "   Cybex   Leg Press    --     TKE    --     Hip Abd    --     Hip Flex    --     Hip Ext    --     HS Curls    --     Knee Ext    --     Step Ups    --     Step Downs    --     Gait    --     CP 10' 10'  10' Home     Initials  1/6 CK [facility] 1/6 CK       Assessment:     Patient able to demonstrate increased quad activation with visual feedback and use of Finnish estim.  Able to complete all therex as per log with assist as noted with reports of "I feel great now" after treatment.  Rates at "maybe 1-2/10"    Patient Education/Response:     Patient educated to continue HEP.  Verbalized understanding.    Plans and Goals:     Continue PT per POC, progress as able.  Monitor response to trial of Russian stim.    1. Pt will be independent with HEP  2. Pt will improve (L) LE strength to at least 75% of (R) LE strength as measured via MicroFet handheld dynamometer in order to improve functional mobility  3. Pt will improve (L) knee AROM to at least 5-100 degrees in order to improve gait     Long Term Goals:  1. Pt will be independent with updated HEP  2. Pt will improve (L) LE strength to at least 90% of (R) LE strength as measured via MicroFet handheld dynamometer in order to improve functional mobility  3. Pt will improve (L) knee AROM to at least 2-120 degrees in order to improve gait and ability to perform ADLs  4. Pt will improve FOTO knee survey score to </= 45% limited in order to demo improved functional mobility  5. Pt will improve score on Function,Daily Living section of KOOS to at least 31/68 ( 45% limited) in order to demo improved functional mobility  6. Pt will perform TUG in < 10 seconds without AD in order to demo improved gait speed  7. Pt will perform at least 10 sit to stands without UE support on 30 second sit to stand test in order to demo improved ability to perform transfers     "

## 2018-09-06 ENCOUNTER — DOCUMENTATION ONLY (OUTPATIENT)
Dept: REHABILITATION | Facility: HOSPITAL | Age: 68
End: 2018-09-06

## 2018-09-06 ENCOUNTER — TELEPHONE (OUTPATIENT)
Dept: GASTROENTEROLOGY | Facility: CLINIC | Age: 68
End: 2018-09-06

## 2018-09-06 ENCOUNTER — TELEPHONE (OUTPATIENT)
Dept: FAMILY MEDICINE | Facility: CLINIC | Age: 68
End: 2018-09-06

## 2018-09-06 ENCOUNTER — CLINICAL SUPPORT (OUTPATIENT)
Dept: REHABILITATION | Facility: HOSPITAL | Age: 68
End: 2018-09-06
Payer: COMMERCIAL

## 2018-09-06 DIAGNOSIS — M25.662 DECREASED ROM OF LEFT KNEE: ICD-10-CM

## 2018-09-06 DIAGNOSIS — R26.89 IMPAIRED GAIT AND MOBILITY: ICD-10-CM

## 2018-09-06 DIAGNOSIS — R29.898 DECREASED STRENGTH OF LOWER EXTREMITY: ICD-10-CM

## 2018-09-06 DIAGNOSIS — M25.562 CHRONIC PAIN OF LEFT KNEE: ICD-10-CM

## 2018-09-06 DIAGNOSIS — G89.29 CHRONIC PAIN OF LEFT KNEE: ICD-10-CM

## 2018-09-06 PROCEDURE — 97112 NEUROMUSCULAR REEDUCATION: CPT | Mod: PN

## 2018-09-06 PROCEDURE — 97140 MANUAL THERAPY 1/> REGIONS: CPT | Mod: PN

## 2018-09-06 PROCEDURE — 97110 THERAPEUTIC EXERCISES: CPT | Mod: PN

## 2018-09-06 NOTE — TELEPHONE ENCOUNTER
----- Message from Mery Salvador sent at 9/6/2018  9:40 AM CDT -----  Contact: [phone]/self  Patient would like to be seen soon follow up from knee replacement surgery.  Please advise.

## 2018-09-06 NOTE — PROGRESS NOTES
"DAILY TREATMENT NOTE    DATE: 9/6/2018    Start Time:  1100  Stop Time:  1205    PROCEDURES:    TIMED  Procedure Min.   Manual therapy 10   Therex 35   Neuromuscular Re-ed 10             UNTIMED  Procedure Min.   Bike 5   Cold Pack 5         Total Timed Minutes:  55  Total Timed Units:  4  Total Untimed Units:  -  Charges Billed/# of units:  2 TE, 1 MT, 1 NM      Progress/Current Status    Subjective:     Patient ID: Huber Pena is a 68 y.o. female.  Diagnosis:   1. Chronic pain of left knee     2. Decreased ROM of left knee     3. Decreased strength of lower extremity     4. Impaired gait and mobility       Mrs. Pena is in better spirits today.  Reports that her pain is well-controlled at this point.  She can see that she is make some gains with PT.   Pain: 3 /10      Objective:   O:  L knee PROM: 0 - 96 degrees.  Poor-to-fair quad set.     Patient performed 30' of therex with PT 1:1.  Patient performed 15' of neuromuscular re-education activities including Chinese EStim x 8' at 10 on/20 off at 80 bps with 2 second ramp to L quadriceps; during ON period time patient performed L knee extension isometrics at 90 degrees.  Manual therapy x 10' retrograde massage to LLE for edema control.  Ended therex portion on bike x 5'; partial revolutions.  Cold pack x 5' while on knee extension creep stretch.          Date  09/6/2018 9/05/18 9/4/18   VISIT      FOTO 6/10 5/5 DONE 4/5   MT  15' 10'    Long Sit HS  Seated with foot propped on chair 2'  2' seated with foot propped   [address] Str[address]      Bike 5' partial revs 5' partial revs 5' partial revs   QS 20 reps 5"x20 + Chinese x 5' 5"x20   SAQ 3x10 AA/AROM  2x10 AAROM   SLR  2x10 AAROM L 2x10 AAROM   SL Abd  2x12 L 2x10   Heel Slides  10"x10 w/straps 10"x10 w/straps   Rox Hip Add      LAQs With Estim (isos at 90 degrees) 2x15 w/assist 2x15 w/assist   Seated Hip Flex  2x15 B 2x15 B   Cybex   Leg Press      TKE      Hip Abd 3x10 // bars     Hip Flex 3x10 // bars     Hip " Ext 3x10 // bars     Standing heel raises 3x10 on block     Mini-squats 3x10 // bars     Knee Ext      Step Ups      Step Downs      Gait      CP 5' 10' 10'    Initials [facility] 1/6 CK       Assessment:   A: s/p L TKA; POD # 10.      Patient Education/Response:     Patient educated to continue HEP.  Verbalized understanding.    Plans and Goals:     Continue PT per POC, progress as able.  Monitor response to trial of Russian stim.    1. Pt will be independent with HEP  2. Pt will improve (L) LE strength to at least 75% of (R) LE strength as measured via MicroFet handheld dynamometer in order to improve functional mobility  3. Pt will improve (L) knee AROM to at least 5-100 degrees in order to improve gait     Long Term Goals:  1. Pt will be independent with updated HEP  2. Pt will improve (L) LE strength to at least 90% of (R) LE strength as measured via MicroFet handheld dynamometer in order to improve functional mobility  3. Pt will improve (L) knee AROM to at least 2-120 degrees in order to improve gait and ability to perform ADLs  4. Pt will improve FOTO knee survey score to </= 45% limited in order to demo improved functional mobility  5. Pt will improve score on Function,Daily Living section of KOOS to at least 31/68 ( 45% limited) in order to demo improved functional mobility  6. Pt will perform TUG in < 10 seconds without AD in order to demo improved gait speed  7. Pt will perform at least 10 sit to stands without UE support on 30 second sit to stand test in order to demo improved ability to perform transfers

## 2018-09-06 NOTE — TELEPHONE ENCOUNTER
----- Message from Myra Spring sent at 9/6/2018  9:37 AM CDT -----  Contact: self, [phone]  Patient called in returning your call. Please advise.

## 2018-09-06 NOTE — TELEPHONE ENCOUNTER
Spoke with patient about scheduling a Colonoscopy. Patient stated that she just had knee surgery and like to be called back at a later date.

## 2018-09-06 NOTE — TELEPHONE ENCOUNTER
Returned patient's call. She had knee replacement surgery 2 weeks ago. She was calling to schedule post op with her pcp. I advised her to schedule the post op with the surgeon. She verbalized understanding

## 2018-09-07 ENCOUNTER — CLINICAL SUPPORT (OUTPATIENT)
Dept: REHABILITATION | Facility: HOSPITAL | Age: 68
End: 2018-09-07
Payer: COMMERCIAL

## 2018-09-07 DIAGNOSIS — R29.898 DECREASED STRENGTH OF LOWER EXTREMITY: ICD-10-CM

## 2018-09-07 DIAGNOSIS — G89.29 CHRONIC PAIN OF LEFT KNEE: ICD-10-CM

## 2018-09-07 DIAGNOSIS — R26.89 IMPAIRED GAIT AND MOBILITY: ICD-10-CM

## 2018-09-07 DIAGNOSIS — M25.562 CHRONIC PAIN OF LEFT KNEE: ICD-10-CM

## 2018-09-07 DIAGNOSIS — M25.662 DECREASED ROM OF LEFT KNEE: ICD-10-CM

## 2018-09-07 PROCEDURE — 97140 MANUAL THERAPY 1/> REGIONS: CPT | Mod: PN

## 2018-09-07 PROCEDURE — 97014 ELECTRIC STIMULATION THERAPY: CPT | Mod: PN

## 2018-09-07 NOTE — PROGRESS NOTES
"DAILY TREATMENT NOTE    DATE: 9/7/2018    Start Time:  11:00 am   Stop Time:  12:00 pm     PROCEDURES:    TIMED  Procedure Min.   TE supervised 50' NC   MT 10'                  UNTIMED  Procedure Min.   Estim  10'          Total Timed Minutes:  10'  Total Timed Units:  1  Total Untimed Units:  0  Charges Billed/# of units:  1 MT      Progress/Current Status    Subjective:     Patient ID: Huber Pena is a 68 y.o. female.  Diagnosis:   1. Chronic pain of left knee     2. Decreased ROM of left knee     3. Decreased strength of lower extremity     4. Impaired gait and mobility       Pain: 6 /10  Pt stated that she was very sore after last session.     Objective:     Patient performed therex per log below supervised x 50' including  Russian EStim x 10' at 5 on/5 off at 80 bps with 2 second ramp to L quadriceps with quad sets and saq.  Manual therapy x 10' consisting retrograde edema reduction to LLE for edema control, STM to (L) quad, hamstring, and gastroc.        Date  9/7/18 09/6/2018 9/05/18 9/4/18   VISIT       FOTO 7/10 6/10 5/5 DONE 4/5   MT 10'   15' 10'    Long Sit HS 3x30 seated  Seated with foot propped on chair 2'  2' seated with foot propped   Gastroc Str.       Bike 5'  5' partial revs 5' partial revs 5' partial revs   QS 5' w/Brazilian 20 reps 5"x20 + Afghan x 5' 5"x20   SAQ 5' w/Brazilian 3x10 AA/AROM  2x10 AAROM   SLR 2x10  2x10 AAROM L 2x10 AAROM   SL Abd 2x12  2x12 L 2x10   Heel Slides 10"x10 w/straps  10"x10 w/straps 10"x10 w/straps   Rox Hip Add       LAQs 2x10 With Estim (isos at 90 degrees) 2x15 w/assist 2x15 w/assist   Seated Hip Flex   2x15 B 2x15 B   Cybex   Leg Press       TKE       Hip Abd  3x10 // bars     Hip Flex  3x10 // bars     Hip Ext  3x10 // bars     Standing heel raises  3x10 on block     Mini-squats  3x10 // bars     Knee Ext       Step Ups       Step Downs       Gait       CP home 5' 10' 10'    Initials CK JH  1/6 CK       Assessment:     Pt tolerated all therex without any " adverse reactions. Pt continues to demo poor (L) quad activation and will continue to benefit from Moldovan Estim and therex focusing on improving (L) quad strength.     Patient Education/Response:     Continue with HEP    Plans and Goals:     Continue PT per POC, progress as able.  Focus on (L) quad strengthening    1. Pt will be independent with HEP  2. Pt will improve (L) LE strength to at least 75% of (R) LE strength as measured via MicroFet handheld dynamometer in order to improve functional mobility  3. Pt will improve (L) knee AROM to at least 5-100 degrees in order to improve gait     Long Term Goals:  1. Pt will be independent with updated HEP  2. Pt will improve (L) LE strength to at least 90% of (R) LE strength as measured via MicroFet handheld dynamometer in order to improve functional mobility  3. Pt will improve (L) knee AROM to at least 2-120 degrees in order to improve gait and ability to perform ADLs  4. Pt will improve FOTO knee survey score to </= 45% limited in order to demo improved functional mobility  5. Pt will improve score on Function,Daily Living section of KOOS to at least 31/68 ( 45% limited) in order to demo improved functional mobility  6. Pt will perform TUG in < 10 seconds without AD in order to demo improved gait speed  7. Pt will perform at least 10 sit to stands without UE support on 30 second sit to stand test in order to demo improved ability to perform transfers

## 2018-09-07 NOTE — PROGRESS NOTES
"DAILY TREATMENT NOTE    DATE: 9/7/2018    Start Time:  1105  Stop Time:  ***    PROCEDURES:    TIMED  Procedure Min.   MT ***   TE ***   NMR ***             UNTIMED  Procedure Min.   Bike 5   Cold Pack 5         Total Timed Minutes:  ***  Total Timed Units:  ***  Total Untimed Units: ***  Charges Billed/# of units:  TE-***, MT-***, NMR      Progress/Current Status    Subjective:     Patient ID: Huber Pena is a 68 y.o. female.  Diagnosis:   1. Chronic pain of left knee     2. Decreased ROM of left knee     3. Decreased strength of lower extremity     4. Impaired gait and mobility       Pain: 4/10    "Last night was horrible," pt reports in respect to pain     Objective:     O:  L knee PROM: 0 - 96 degrees.  Poor-to-fair quad set.     Patient performed ***' of therex with PT 1:1.  Patient performed ***' of neuromuscular re-education activities including Monegasque EStim x 8' at 10 on/20 off at 80 bps with 2 second ramp to L quadriceps; during ON period time patient performed L knee extension isometrics at 90 degrees.  Manual therapy x ***' retrograde massage to LLE for edema control.  Ended therex portion on bike x 5'; partial revolutions.  Cold pack x ***' while on knee extension creep stretch.      Date  9/7/18 09/6/2018 9/05/18 9/4/18   VISIT       FOTO 7/10 6/10 5/5 DONE 4/5   MT   15' 10'    Long Sit HS   Seated with foot propped on chair 2'  2' seated with foot propped   [address]       Bike 5' partial revs 5' partial revs 5' partial revs 5' partial revs   QS  20 reps 5"x20 + Monegasque x 5' 5"x20   SAQ  3x10 AA/AROM  2x10 AAROM   SLR   2x10 AAROM L 2x10 AAROM   SL Abd   2x12 L 2x10   Heel Slides   10"x10 w/straps 10"x10 w/straps   Rox Hip Add       LAQs  With Estim (isos at 90 degrees) 2x15 w/assist 2x15 w/assist   Seated Hip Flex   2x15 B 2x15 B   Cybex   Leg Press       TKE       Hip Abd 3x10 // bars 3x10 // bars     Hip Flex 3x10 // bars 3x10 // bars     Hip Ext 3x10 // bars 3x10 // bars     Standing heel " raises 3x10 on block  3x10 on block     Mini-squats 3x10 // bars 3x10 // bars     Knee Ext       Step Ups       Step Downs       Gait       CP  5' 10' 10'    Initials [facility] 1/6 CK     Assessment:     A: s/p L TKA; POD # 10.      Patient Education/Response:     Patient educated to continue HEP.  Verbalized understanding.    Plans and Goals:     Continue PT per POC, progress as able.  Monitor response to trial of Russian stim.    1. Pt will be independent with HEP  2. Pt will improve (L) LE strength to at least 75% of (R) LE strength as measured via MicroFet handheld dynamometer in order to improve functional mobility  3. Pt will improve (L) knee AROM to at least 5-100 degrees in order to improve gait     Long Term Goals:  1. Pt will be independent with updated HEP  2. Pt will improve (L) LE strength to at least 90% of (R) LE strength as measured via MicroFet handheld dynamometer in order to improve functional mobility  3. Pt will improve (L) knee AROM to at least 2-120 degrees in order to improve gait and ability to perform ADLs  4. Pt will improve FOTO knee survey score to </= 45% limited in order to demo improved functional mobility  5. Pt will improve score on Function,Daily Living section of KOOS to at least 31/68 ( 45% limited) in order to demo improved functional mobility  6. Pt will perform TUG in < 10 seconds without AD in order to demo improved gait speed  7. Pt will perform at least 10 sit to stands without UE support on 30 second sit to stand test in order to demo improved ability to perform transfers

## 2018-09-10 ENCOUNTER — CLINICAL SUPPORT (OUTPATIENT)
Dept: REHABILITATION | Facility: HOSPITAL | Age: 68
End: 2018-09-10
Payer: COMMERCIAL

## 2018-09-10 DIAGNOSIS — R26.89 IMPAIRED GAIT AND MOBILITY: ICD-10-CM

## 2018-09-10 DIAGNOSIS — R29.898 DECREASED STRENGTH OF LOWER EXTREMITY: ICD-10-CM

## 2018-09-10 DIAGNOSIS — G89.29 CHRONIC PAIN OF LEFT KNEE: ICD-10-CM

## 2018-09-10 DIAGNOSIS — M25.562 CHRONIC PAIN OF LEFT KNEE: ICD-10-CM

## 2018-09-10 DIAGNOSIS — M25.662 DECREASED ROM OF LEFT KNEE: ICD-10-CM

## 2018-09-10 PROCEDURE — 97110 THERAPEUTIC EXERCISES: CPT | Mod: PN

## 2018-09-10 PROCEDURE — 97014 ELECTRIC STIMULATION THERAPY: CPT | Mod: PN

## 2018-09-10 NOTE — PROGRESS NOTES
"DAILY TREATMENT NOTE    DATE: 9/10/2018    Start Time:  12:00 pm   Stop Time:  1:04 pm     PROCEDURES:    TIMED  Procedure Min.   TE supervised 30' NC   TE 34'                  UNTIMED  Procedure Min.   Estim 10'          Total Timed Minutes:  34'  Total Timed Units:  2  Total Untimed Units:  1  Charges Billed/# of units:  3 ( 2 TE, 1 Estim)       Progress/Current Status    Subjective:     Patient ID: Huber Pena is a 68 y.o. female.  Diagnosis:   1. Chronic pain of left knee     2. Decreased ROM of left knee     3. Decreased strength of lower extremity     4. Impaired gait and mobility       Pain: 2/10      Objective:     Patient performed therex per log below supervised x 30' and 1:1 with PT x 34' including Objective measurements.  Finnish EStim x 10' at 5 on/5 off at 80 bps with 2 second ramp to L quadriceps with quad sets and saq.         Date  9/10/18 9/7/18 09/6/2018 9/05/18 9/4/18   VISIT        FOTO 8/10 7/10 6/10 5/5 DONE 4/5   MT  10'   15' 10'    Long Sit HS 3x30"  3x30 seated  Seated with foot propped on chair 2'  2' seated with foot propped   [address]        Bike 8'  5'  5' partial revs 5' partial revs 5' partial revs   QS 5' w/russian 5' w/Bhutanese 20 reps 5"x20 + Finnish x 5' 5"x20   SAQ 5' w/Bhutanese  5' w/Bhutanese 3x10 AA/AROM  2x10 AAROM   SLR 1x10 w/ min A 2x10  2x10 AAROM L 2x10 AAROM   SL Abd  2x12  2x12 L 2x10   Heel Slides 10"x10 w/straps 10"x10 w/straps  10"x10 w/straps 10"x10 w/straps   Rox Hip Add        LAQs  2x10 With Estim (isos at 90 degrees) 2x15 w/assist 2x15 w/assist   Seated Hip Flex    2x15 B 2x15 B   Cybex   Leg Press NEXT       TKE NEXT       Hip Abd 2x10   3x10 // bars     Hip Flex 2x10 //  3x10 // bars     Hip Ext 2x10 //   3x10 // bars     Standing heel raises   3x10 on block     Mini-squats NEXT  3x10 // bars     Knee Ext        Step Ups        Step Downs        Gait note       CP home home 5' 10' 10'    Initials CK  CK JH DH 1/6 CK     Range of " Motion/Strength:    AROM: 0- 92 degrees     L/E Strength w/ MicroFET Muscle Luther Dynamometer Right Left Pain/Dysfunction with Movement   (approx 4 sec hold w/ max contraction)   Hip Flexion 10.0 kg  9.6 kg      Hip Abduction 8.8 kg  8.1 kg      Quadriceps 10.1 kg  6.3 kg      Hamstrings 13.1 kg  7.9 kg        TU seconds   30 second sit to stand test: 7 w/UE support      Assessment:     Pt demo improved (L) knee flexion and extension AROM compared to measurements taken last week. Pt also demo improved (L) LE strength, especially (L) knee extension strength compared to measurements taken last week. Despite this improvement, pt does still require assistance to perform (L) SLR 2/2 to weakness. Pt was able to perform full forward revs on the bike today after max encouragement from PT. Pt practiced ambulating in // with SPC with VC for proper technique. Pt demo steady gait with SPC in //. Continue to work on gait training in gym with SPC. Pt was instructed to continue to ambulate with RW outside of PT. Pt verbalized understanding. Pt tolerated therapy session without any adverse reactions.     Patient Education/Response:     Continue with HEP. Pt was educated on and given handout on updated HEP. Pt demo/verbalized understanding.     Plans and Goals:     Continue PT per POC, progress as able.  Focus on (L) quad strengthening and gait training with SPC, progress standing therex     1. Pt will be independent with HEP  2. Pt will improve (L) LE strength to at least 75% of (R) LE strength as measured via MicroFet handheld dynamometer in order to improve functional mobility  3. Pt will improve (L) knee AROM to at least 5-100 degrees in order to improve gait     Long Term Goals:  1. Pt will be independent with updated HEP  2. Pt will improve (L) LE strength to at least 90% of (R) LE strength as measured via MicroFet handheld dynamometer in order to improve functional mobility  3. Pt will improve (L) knee AROM to at least  2-120 degrees in order to improve gait and ability to perform ADLs  4. Pt will improve FOTO knee survey score to </= 45% limited in order to demo improved functional mobility  5. Pt will improve score on Function,Daily Living section of KOOS to at least 31/68 ( 45% limited) in order to demo improved functional mobility  6. Pt will perform TUG in < 10 seconds without AD in order to demo improved gait speed  7. Pt will perform at least 10 sit to stands without UE support on 30 second sit to stand test in order to demo improved ability to perform transfers

## 2018-09-11 NOTE — PROGRESS NOTES
"  Physical Therapy Daily Treatment Note     Name: Huber Pena  Clinic Number: [id number]    Therapy Diagnosis:   Encounter Diagnoses   Name Primary?    Chronic pain of left knee     Decreased ROM of left knee     Decreased strength of lower extremity     Impaired gait and mobility      Physician: Santiago Lomax MD    Visit Date: 9/12/2018    Physician Orders: PT eval and treat  Medical Diagnosis: left knee pain  Evaluation Date: 8/28/18  Authorization Period Expiration: 12/31/18  Plan of Care Certification Period:8/28/18 to 10/9/18   Visit #/Visits authorized: 9/ 30     Time In: 1100  Time Out: 1210  Total Billable Time: 60 minutes (TE-3, GT-1)    Precautions: WBAT on (L) LE, osteopenia, hx of chest pain, hx of chest pain, hx of fainting/dizziness standard    Subjective     Pt reports: no new c/o.  Still with usual pain.  She was compliant with home exercise program.  Response to previous treatment: no adverse reaction  Functional change: none    Pain: 2/10  Location: left knee      Objective     Huber received therapeutic exercises to develop strength and ROM for 50 minutes with PTA 1:1 including:  See log    Date  9/12/18 9/10/18 9/7/18 09/6/2018   VISIT          FOTO 9/10 8/10 7/10 6/10   MT    10'      Long Sit HS  3x30"  3x30 seated     [address]          Bike 8' 8'  5'  5' partial revs   QS 5' w/Grenadian 5' w/russian 5' w/Grenadian 20 reps   SAQ 5' w/Grenadian 5' w/Grenadian  5' w/russian 3x10 AA/AROM   SLR x10 w/min/CGA 1x10 w/ min A 2x10     SL Abd    2x12     Heel Slides 10x10" w/straps 10"x10 w/straps 10"x10 w/straps     Rox Hip Add          LAQs    2x10 With Estim (isos at 90 degrees)   Seated Hip Flex          Cybex   Leg Press OOT NEXT       TKE PTC 2x10 L NEXT       Hip Abd 2x10// 2x10    3x10 // bars   Hip Flex 2x10// 2x10 //   3x10 // bars   Hip Ext 2x10// 2x10 //    3x10 // bars   Standing heel raises      3x10 on block   Mini-squats 2x10// NEXT   3x10 // bars   Knee Ext          Step Ups        "   Step Downs          Gait See note note       CP  home home 5'   Initials SR 1/6 CK  CK JH       Portuguese EStim x 10' at 5 on/5 off at 80 bps with 2 second ramp to L quadriceps with quad sets and saq.      Huber participated in gait training to improve functional mobility and safety for 10  minutes, including:  Gait training with SPC, VCs for sequencing, heel strike and cane placement         Home Exercises Provided and Patient Education Provided     Education provided:   - cane use in home only, still use RW when leaving home    Written Home Exercises Provided: Patient instructed to cont prior HEP.  Exercises were reviewed and Huber was able to demonstrate them prior to the end of the session.  Huber demonstrated good  understanding of the education provided.     See EMR under Patient Instructions for exercises provided prior visit.    Assessment     Pt with decreased quad strength, tolerates therapy activities without difficulites or c/o increased pain  Huber is progressing well towards her goals.   Pt prognosis is Good.     Pt will continue to benefit from skilled outpatient physical therapy to address the deficits listed in the problem list box on initial evaluation, provide pt/family education and to maximize pt's level of independence in the home and community environment.     Pt's spiritual, cultural and educational needs considered and pt agreeable to plan of care and goals.    Anticipated barriers to physical therapy: none    Goals:   1. Pt will be independent with HEP  2. Pt will improve (L) LE strength to at least 75% of (R) LE strength as measured via MicroFet handheld dynamometer in order to improve functional mobility  3. Pt will improve (L) knee AROM to at least 5-100 degrees in order to improve gait     Long Term Goals:  1. Pt will be independent with updated HEP  2. Pt will improve (L) LE strength to at least 90% of (R) LE strength as measured via MicroFet handheld dynamometer in order to improve  functional mobility  3. Pt will improve (L) knee AROM to at least 2-120 degrees in order to improve gait and ability to perform ADLs  4. Pt will improve FOTO knee survey score to </= 45% limited in order to demo improved functional mobility  5. Pt will improve score on Function,Daily Living section of KOOS to at least 31/68 ( 45% limited) in order to demo improved functional mobility  6. Pt will perform TUG in < 10 seconds without AD in order to demo improved gait speed  7. Pt will perform at least 10 sit to stands without UE support on 30 second sit to stand test in order to demo improved ability to perform transfers    Plan     Cont POC to progress towards established goals    Nicki Thurston, PTA

## 2018-09-12 ENCOUNTER — TELEPHONE (OUTPATIENT)
Dept: FAMILY MEDICINE | Facility: CLINIC | Age: 68
End: 2018-09-12

## 2018-09-12 ENCOUNTER — CLINICAL SUPPORT (OUTPATIENT)
Dept: REHABILITATION | Facility: HOSPITAL | Age: 68
End: 2018-09-12
Payer: COMMERCIAL

## 2018-09-12 DIAGNOSIS — Z12.11 COLON CANCER SCREENING: Primary | ICD-10-CM

## 2018-09-12 DIAGNOSIS — M25.562 CHRONIC PAIN OF LEFT KNEE: ICD-10-CM

## 2018-09-12 DIAGNOSIS — G89.29 CHRONIC PAIN OF LEFT KNEE: ICD-10-CM

## 2018-09-12 DIAGNOSIS — M25.662 DECREASED ROM OF LEFT KNEE: ICD-10-CM

## 2018-09-12 DIAGNOSIS — R26.89 IMPAIRED GAIT AND MOBILITY: ICD-10-CM

## 2018-09-12 DIAGNOSIS — R29.898 DECREASED STRENGTH OF LOWER EXTREMITY: ICD-10-CM

## 2018-09-12 PROCEDURE — 97116 GAIT TRAINING THERAPY: CPT | Mod: PN

## 2018-09-12 PROCEDURE — 97110 THERAPEUTIC EXERCISES: CPT | Mod: PN

## 2018-09-12 NOTE — TELEPHONE ENCOUNTER
----- Message from Nay Mckeon sent at 9/12/2018  3:26 PM CDT -----  Contact: self / [phone]  Patient is requesting a call back regarding, she needs orders to have an colonoscopy. please advise

## 2018-09-14 ENCOUNTER — CLINICAL SUPPORT (OUTPATIENT)
Dept: REHABILITATION | Facility: HOSPITAL | Age: 68
End: 2018-09-14
Payer: COMMERCIAL

## 2018-09-14 DIAGNOSIS — M25.662 DECREASED ROM OF LEFT KNEE: ICD-10-CM

## 2018-09-14 DIAGNOSIS — M25.562 CHRONIC PAIN OF LEFT KNEE: ICD-10-CM

## 2018-09-14 DIAGNOSIS — R26.89 IMPAIRED GAIT AND MOBILITY: ICD-10-CM

## 2018-09-14 DIAGNOSIS — G89.29 CHRONIC PAIN OF LEFT KNEE: ICD-10-CM

## 2018-09-14 DIAGNOSIS — R29.898 DECREASED STRENGTH OF LOWER EXTREMITY: ICD-10-CM

## 2018-09-14 PROCEDURE — 97110 THERAPEUTIC EXERCISES: CPT | Mod: PN

## 2018-09-14 NOTE — PROGRESS NOTES
"  Physical Therapy Daily Treatment Note     Name: Huber Pena  Clinic Number: [id number]    Therapy Diagnosis:   Encounter Diagnoses   Name Primary?    Chronic pain of left knee     Decreased ROM of left knee     Decreased strength of lower extremity     Impaired gait and mobility      Physician: Santiago Lomax MD    Visit Date: 9/14/2018    Physician Orders: PT eval and treat  Medical Diagnosis: left knee pain  Evaluation Date: 8/28/18  Authorization Period Expiration: 12/31/18  Plan of Care Certification Period:8/28/18 to 10/9/18   Visit #/Visits authorized: 10/ 30     Time In: 1105  Time Out: 1210  Total Billable Time: 65 minutes (TE-4)    Precautions: WBAT on (L) LE, osteopenia, hx of chest pain, hx of chest pain, hx of fainting/dizziness standard    Subjective     Pt reports: mild pain and difficulty with extending her L leg in sitting (LAQ). Went to [facility] yesterday with good approval. Pt ambulating with SPC and getting in and out of the car without assistance.  She was compliant with home exercise program.  Response to previous treatment: no adverse reaction  Functional change: none    Pain: 2/10  Location: left knee      Objective     Huber received therapeutic exercises to develop strength and ROM for 65 minutes with PT including:  See log    Date  9/14/18 9/12/18 9/10/18 9/7/18 09/6/2018   VISIT 10/10          FOTO 10/10 DONE 9/10 8/10 7/10 6/10   MT     10'      Long Sit HS   3x30"  3x30 seated     [address]           Bike 8' 8' 8'  5'  5' partial revs   QS 10x5'' joey under knee 5' w/russian 5' w/russian 5' w/Montenegrin 20 reps   SAQ 5' w/ Montenegrin 5' w/Montenegrin 5' w/Montenegrin  5' w/russian 3x10 AA/AROM   SLR 10x CGA  Mod lag present x10 w/min/CGA 1x10 w/ min A 2x10     SL Abd 2x10 L    2x12     Heel Slides 10x10'' w/ straps 10x10" w/straps 10"x10 w/straps 10"x10 w/straps     Rox Hip Add           LAQs Next     2x10 With Estim (isos at 90 degrees)   Seated Hip Flex           Cybex   Leg Press 2x10 DL  3 " plates OOT NEXT       TKE PTC 2x10 L PTC 2x10 L NEXT       Hip Abd 2x10 /// 2x10// 2x10    3x10 // bars   Hip Flex 2x10 // 2x10// 2x10 //   3x10 // bars   Hip Ext 2x10 // 2x10// 2x10 //    3x10 // bars   Standing heel raises       3x10 on block   Mini-squats 2x10 // 2x10// NEXT   3x10 // bars   Knee Ext           Step Ups           Step Downs           Gait -- See note note       CP Home   home home 5'   Initials CARMENCITA SR 1/6 CK  CK JH     Israeli EStim x 5' at 5 on/5 off at 80 bps and 53 mA with 2 second ramp to L quadriceps with SAQs    Huber participated in gait training to improve functional mobility and safety for 2minutes, including:  Outside level surface ambulation with SPC at Mod I with min increased forward lean, decreased dani, and decreased L knee flexion in preswing/swing.    KOOS: ADLs section 28/68 = 41.18% function    Home Exercises Provided and Patient Education Provided     Education provided:   - cane use in home only, still use RW when leaving home    Written Home Exercises Provided: Patient instructed to cont prior HEP.  Exercises were reviewed and Huber was able to demonstrate them prior to the end of the session.  Meeti demonstrated good  understanding of the education provided.     See EMR under Patient Instructions for exercises provided prior visit.    Assessment     Pt demo good quad contraction for quad sets, however weakness still seen with Mod SLR lag and decreased extension with SAQ. Pt able to transfer in and out of car at Mod I with SPC. Advance gait training next visit on unlevel surfaces and stairs. Pt given permission to walk outside on level surfaces with SPC - Mod I. FOTO and ADLs KOOS done today.    Huber is progressing well towards her goals.   Pt prognosis is Good.     Pt will continue to benefit from skilled outpatient physical therapy to address the deficits listed in the problem list box on initial evaluation, provide pt/family education and to maximize pt's level of  independence in the home and community environment.     Pt's spiritual, cultural and educational needs considered and pt agreeable to plan of care and goals.    Anticipated barriers to physical therapy: none    Goals:   1. Pt will be independent with HEP  2. Pt will improve (L) LE strength to at least 75% of (R) LE strength as measured via MicroFet handheld dynamometer in order to improve functional mobility  3. Pt will improve (L) knee AROM to at least 5-100 degrees in order to improve gait     Long Term Goals:  1. Pt will be independent with updated HEP  2. Pt will improve (L) LE strength to at least 90% of (R) LE strength as measured via MicroFet handheld dynamometer in order to improve functional mobility  3. Pt will improve (L) knee AROM to at least 2-120 degrees in order to improve gait and ability to perform ADLs  4. Pt will improve FOTO knee survey score to </= 45% limited in order to demo improved functional mobility  5. Pt will improve score on Function,Daily Living section of KOOS to at least 31/68 ( 45% limited) in order to demo improved functional mobility  6. Pt will perform TUG in < 10 seconds without AD in order to demo improved gait speed  7. Pt will perform at least 10 sit to stands without UE support on 30 second sit to stand test in order to demo improved ability to perform transfers    Plan     Cont POC to progress towards established goals    George Ngo, PT

## 2018-09-17 ENCOUNTER — CLINICAL SUPPORT (OUTPATIENT)
Dept: REHABILITATION | Facility: HOSPITAL | Age: 68
End: 2018-09-17
Payer: COMMERCIAL

## 2018-09-17 DIAGNOSIS — M25.662 DECREASED ROM OF LEFT KNEE: ICD-10-CM

## 2018-09-17 DIAGNOSIS — R26.89 IMPAIRED GAIT AND MOBILITY: ICD-10-CM

## 2018-09-17 DIAGNOSIS — R29.898 DECREASED STRENGTH OF LOWER EXTREMITY: ICD-10-CM

## 2018-09-17 DIAGNOSIS — M25.562 CHRONIC PAIN OF LEFT KNEE: ICD-10-CM

## 2018-09-17 DIAGNOSIS — G89.29 CHRONIC PAIN OF LEFT KNEE: ICD-10-CM

## 2018-09-17 PROCEDURE — 97110 THERAPEUTIC EXERCISES: CPT | Mod: PN

## 2018-09-17 PROCEDURE — 97116 GAIT TRAINING THERAPY: CPT | Mod: PN

## 2018-09-17 NOTE — PROGRESS NOTES
"  Physical Therapy Daily Treatment Note     Name: Huber Pena  Clinic Number: [id number]    Therapy Diagnosis:   Encounter Diagnoses   Name Primary?    Chronic pain of left knee     Decreased ROM of left knee     Decreased strength of lower extremity     Impaired gait and mobility      Physician: Santiago Lomax MD    Visit Date: 9/17/2018    Physician Orders: PT eval and treat  Medical Diagnosis: left knee pain  Evaluation Date: 8/28/18  Authorization Period Expiration: 12/31/18  Plan of Care Certification Period:8/28/18 to 10/9/18   Visit #/Visits authorized: 11/ 30      Time In: 1100  Time Out: 1210  Total Billable Time: 30 minutes (GT-1, TE-1)     Precautions: WBAT on (L) LE, osteopenia, hx of chest pain, hx of chest pain, hx of fainting/dizziness standard    Subjective     Pt reports: she got some new medication from MD and was able to sleep better last night.  C/o about swelling though it has not increased, pt just doesn't like it.  She was compliant with home exercise program.  Response to previous treatment: no adverse reaction  Functional change: none    Pain: 4/10  Location: left knee      Objective     Huber received therapeutic exercises to develop strength and ROM for 20 minutes with PTA 1:1 and 30 minutes with supervision including:  See log    Date  9/17/18 9/14/18 9/12/18   VISIT 11 10/10     FOTO  10/10 DONE 9/10   MT        Long Sit HS        [address]        Bike 5' 8' 8'   QS 10x5" w/russian 10x5'' joey under knee 5' w/russian   SAQ 5' w/Finnish 5' w/ Finnish 5' w/russian   SLR 10x w/min/CGA  10x CGA  Mod lag present x10 w/min/CGA   SL Abd  2x10 L     Heel Slides 10x10" w/straps 10x10'' w/ straps 10x10" w/straps   Rox Hip Add        LAQs 2x10 Next      Seated Hip Flex        Cybex   Leg Press 2x10 DL 3 plates 2x10 DL  3 plates OOT   TKE PTC 2x10 L PTC 2x10 L PTC 2x10 L   Hip Abd 2x10 // 2x10 /// 2x10//   Hip Flex 2x10 // 2x10 // 2x10//   Hip Ext 2x10 // 2x10 // 2x10//   Standing heel raises  "       Mini-squats 2x10 // 2x10 // 2x10//   Knee Ext        Step Ups        Step Downs        Gait See note -- See note   CP  Home      Initials SR 1/6 CARMENCITA SR 1/6          Huber participated in gait training to improve functional mobility and safety for 10  minutes, including:  Ambulation training on unlevel surface outside x 200 ft with SBA.  Negotiates curb with VCs for sequencing CGA.    Huber received cold pack for 10 minutes to L knee.      Home Exercises Provided and Patient Education Provided     Education provided:   - Cont HEP daily    Written Home Exercises Provided: Patient instructed to cont prior HEP.  Exercises were reviewed and Huber was able to demonstrate them prior to the end of the session.  Huber demonstrated good  understanding of the education provided.     See EMR under Patient Instructions for exercises provided prior visit.    Assessment     Pt tolerates gait training with some c/o mild increase in pain upon completion that subsides with continuing therex  Huber is progressing well towards her goals.   Pt prognosis is Excellent.     Pt will continue to benefit from skilled outpatient physical therapy to address the deficits listed in the problem list box on initial evaluation, provide pt/family education and to maximize pt's level of independence in the home and community environment.     Pt's spiritual, cultural and educational needs considered and pt agreeable to plan of care and goals.    Anticipated barriers to physical therapy: none    Goals:   1. Pt will be independent with HEP  2. Pt will improve (L) LE strength to at least 75% of (R) LE strength as measured via MicroFet handheld dynamometer in order to improve functional mobility  3. Pt will improve (L) knee AROM to at least 5-100 degrees in order to improve gait     Long Term Goals:  1. Pt will be independent with updated HEP  2. Pt will improve (L) LE strength to at least 90% of (R) LE strength as measured via MicroFet handheld  dynamometer in order to improve functional mobility  3. Pt will improve (L) knee AROM to at least 2-120 degrees in order to improve gait and ability to perform ADLs  4. Pt will improve FOTO knee survey score to </= 45% limited in order to demo improved functional mobility  5. Pt will improve score on Function,Daily Living section of KOOS to at least 31/68 ( 45% limited) in order to demo improved functional mobility  6. Pt will perform TUG in < 10 seconds without AD in order to demo improved gait speed  7. Pt will perform at least 10 sit to stands without UE support on 30 second sit to stand test in order to demo improved ability to perform transfers       Plan     Cont POC to progress towards established goals    Nicki Thurston, PTA

## 2018-09-19 ENCOUNTER — CLINICAL SUPPORT (OUTPATIENT)
Dept: REHABILITATION | Facility: HOSPITAL | Age: 68
End: 2018-09-19
Payer: COMMERCIAL

## 2018-09-19 DIAGNOSIS — G89.29 CHRONIC PAIN OF LEFT KNEE: ICD-10-CM

## 2018-09-19 DIAGNOSIS — R26.89 IMPAIRED GAIT AND MOBILITY: ICD-10-CM

## 2018-09-19 DIAGNOSIS — R29.898 DECREASED STRENGTH OF LOWER EXTREMITY: ICD-10-CM

## 2018-09-19 DIAGNOSIS — M25.562 CHRONIC PAIN OF LEFT KNEE: ICD-10-CM

## 2018-09-19 DIAGNOSIS — M25.662 DECREASED ROM OF LEFT KNEE: ICD-10-CM

## 2018-09-19 PROCEDURE — 97110 THERAPEUTIC EXERCISES: CPT | Mod: PN

## 2018-09-19 NOTE — PROGRESS NOTES
"DAILY TREATMENT NOTE    DATE: 9/19/2018    Start Time:  11:30 am   Stop Time:  12:34 pm     PROCEDURES:    TIMED  Procedure Min.   TE supervised 29' NC   TE 25'                  UNTIMED  Procedure Min.   CP 10'          Total Timed Minutes:  25'   Total Timed Units:  2  Total Untimed Units:  0  Charges Billed/# of units:  2 TE      Progress/Current Status    Subjective:     Patient ID: Huber Pena is a 68 y.o. female.  Diagnosis:   1. Chronic pain of left knee     2. Decreased ROM of left knee     3. Decreased strength of lower extremity     4. Impaired gait and mobility       Pain: 1 /10 (L) knee prior to therapy session  Pt stated that she did her elliptical at home yesterday and experienced increased pain and difficulty sleeping last night. Pt stated that she did her HEP this morning and walked around her home this morning. Pt reported that her (L) knee was feeling better today and that she was just experiencing soreness in (L) knee prior to therapy session.     Objective:     Huber received therapeutic exercises to develop strength and ROM for 25 minutes with PT 1:1 and 29 minutes with supervision including: log below. CP to (L) knee at the end of therapy session x 10' with (L) LE elevated.       Date  9/19/18 9/17/18 9/14/18 9/12/18   VISIT 12 11 10/10     FOTO   10/10 DONE 9/10   MT         Long Sit HS 3x30" stairs         [address] 3x30" fitter        Bike 5'  5' 8' 8'   QS - 10x5" w/russian 10x5'' joey under knee 5' w/Greenlandic   SAQ - 5' w/Greenlandic 5' w/ [place]ic 5' w/russian   SLR 2x10 10x w/min/CGA  10x CGA  Mod lag present x10 w/min/CGA   SL Abd   2x10 L     Heel Slides 10"x10 w/straps 10x10" w/straps 10x10'' w/ straps 10x10" w/straps   Rox Hip Add         LAQs 2x15 2x10 Next      Seated Hip Flex         Cybex   Leg Press 3.5 2x10  2x10 DL 3 plates 2x10 DL  3 plates OOT   TKE PTC 2x15 L PTC 2x10 L PTC 2x10 L PTC 2x10 L   Hip Abd RTC 2x10  2x10 // 2x10 /// 2x10//   Hip Flex RTC 2x10  2x10 // 2x10 // " 2x10//   Hip Ext RTC 2x10 2x10 // 2x10 // 2x10//   Standing heel raises         Mini-squats  2x10 // 2x10 // 2x10//   Knee Ext         Step Ups 2x10 L1        Step Downs 2x10 L1        Gait note See note -- See note   CP   Home      Initials CK SR 1/6 CARMENCITA SR 1/6       Assessment:     Pt ambulated one lap in gym without SPC and no LOB episodes. Instructed pt to continue to ambulate with SPC when ambulating outside of her home. Pt verbalized understanding. Pt was able to tolerate all therex including progression of therex noted per log above without any adverse reactions.     Patient Education/Response:     Pt was educated to not perform elliptical. Pt verbalized understanding. Continue with HEP. Also educated pt to continue to ice (L) knee off/on throughout the day for approximately 10 minutes especially after performing exercises and walking for prolonged period of time. Pt verbalized understanding.     Plans and Goals:   Continue per POC, progress as tolerated. Perform gait training without SPC outside (weather permitting) next visit     Goals:   1. Pt will be independent with HEP  2. Pt will improve (L) LE strength to at least 75% of (R) LE strength as measured via MicroFet handheld dynamometer in order to improve functional mobility  3. Pt will improve (L) knee AROM to at least 5-100 degrees in order to improve gait     Long Term Goals:  1. Pt will be independent with updated HEP  2. Pt will improve (L) LE strength to at least 90% of (R) LE strength as measured via MicroFet handheld dynamometer in order to improve functional mobility  3. Pt will improve (L) knee AROM to at least 2-120 degrees in order to improve gait and ability to perform ADLs  4. Pt will improve FOTO knee survey score to </= 45% limited in order to demo improved functional mobility  5. Pt will improve score on Function,Daily Living section of KOOS to at least 31/68 ( 45% limited) in order to demo improved functional mobility  6. Pt will perform  TUG in < 10 seconds without AD in order to demo improved gait speed  7. Pt will perform at least 10 sit to stands without UE support on 30 second sit to stand test in order to demo improved ability to perform transfers

## 2018-09-21 ENCOUNTER — CLINICAL SUPPORT (OUTPATIENT)
Dept: REHABILITATION | Facility: HOSPITAL | Age: 68
End: 2018-09-21
Payer: COMMERCIAL

## 2018-09-21 DIAGNOSIS — M25.562 CHRONIC PAIN OF LEFT KNEE: ICD-10-CM

## 2018-09-21 DIAGNOSIS — R29.898 DECREASED STRENGTH OF LOWER EXTREMITY: ICD-10-CM

## 2018-09-21 DIAGNOSIS — R26.89 IMPAIRED GAIT AND MOBILITY: ICD-10-CM

## 2018-09-21 DIAGNOSIS — M25.662 DECREASED ROM OF LEFT KNEE: ICD-10-CM

## 2018-09-21 DIAGNOSIS — G89.29 CHRONIC PAIN OF LEFT KNEE: ICD-10-CM

## 2018-09-21 PROCEDURE — 97110 THERAPEUTIC EXERCISES: CPT | Mod: PN

## 2018-09-21 NOTE — PROGRESS NOTES
"  Physical Therapy Daily Treatment Note     Name: Huber Pena  Clinic Number: [id number]    Therapy Diagnosis:   Encounter Diagnoses   Name Primary?    Chronic pain of left knee     Decreased ROM of left knee     Decreased strength of lower extremity     Impaired gait and mobility      Physician: Santiago Lomax MD    Visit Date: 9/21/2018    Physician Orders: PT eval and treat  Medical Diagnosis: left knee pain  Evaluation Date: 8/28/18  Authorization Period Expiration: 12/31/18  Plan of Care Certification Period:8/28/18 to 10/9/18   Visit #/Visits authorized: 13/ 30      Time In: 1100  Time Out: 1215  Total Billable Time: 60 minutes (TE-4)     Precautions: WBAT on (L) LE, osteopenia, hx of chest pain, hx of chest pain, hx of fainting/dizziness standard    Subjective     Pt reports: knee is feeling good.  Did some therapy exercises this morning and a little sore  She was compliant with home exercise program.  Response to previous treatment: no adverse reaction  Functional change: ambulates into clinic holding cane    Pain: 4/10  Location: left knee      Objective     Huber received therapeutic exercises to develop strength and ROM for 60 minutes with PTA 1:1 including:  See log    Date  9/21/18 9/19/18 9/17/18   VISIT 13 12 11   FOTO        MT        Long Sit HS 3x30" L stairs 3x30" stairs      [address] 3x30" DL fitter 3x30" fitter     Bike 5' 5'  5'   QS  - 10x5" w/[place] Islander   SAQ  - 5' w/[place]er   SLR 2x10 2x10 10x w/min/CGA    SL Abd        Heel Slides 10"x10 w/straps 10"x10 w/straps 10x10" w/straps   Rox Hip Add        LAQs 2x15 2x15 2x10   Seated Hip Flex        Cybex   Leg Press 3.5 3x10 3.5 2x10  2x10 DL 3 plates   TKE PTC 2x15 L PTC 2x15 L PTC 2x10 L   Hip Abd RTC 2x10 RTC 2x10  2x10 //   Hip Flex RTC 2x10 RTC 2x10  2x10 //   Hip Ext RTC 2x10 RTC 2x10 2x10 //   Standing heel raises        Mini-squats 2x10 //   2x10 //   Knee Ext        Step Ups OOT 2x10 L1     Step Downs OOT 2x10 L1     Gait  note See " note   CP        Initials SR 1/6 CK SR 1/6        Huber participated in gait training to improve functional mobility and safety for 5  minutes, including:  Pt ambulated one lap in gym without SPC and no LOB episodes. Instructed pt to continue to ambulate with SPC when ambulating outside of her home. Pt verbalized understanding.      Huber received cold pack for 10 minutes to L knee.      Home Exercises Provided and Patient Education Provided     Education provided:   - cont HEP to include standing therex    Written Home Exercises Provided: Patient instructed to cont prior HEP.  Exercises were reviewed and Huber was able to demonstrate them prior to the end of the session.  Huber demonstrated good  understanding of the education provided.     See EMR under Patient Instructions for exercises provided prior visit.    Assessment     Pt tolerates all therapy activities without difficulites, requests ice upon completion for pain prevention  Huber is progressing well towards her goals.   Pt prognosis is Good.     Pt will continue to benefit from skilled outpatient physical therapy to address the deficits listed in the problem list box on initial evaluation, provide pt/family education and to maximize pt's level of independence in the home and community environment.     Pt's spiritual, cultural and educational needs considered and pt agreeable to plan of care and goals.    Anticipated barriers to physical therapy: none    Goals:   1. Pt will be independent with HEP  2. Pt will improve (L) LE strength to at least 75% of (R) LE strength as measured via MicroFet handheld dynamometer in order to improve functional mobility  3. Pt will improve (L) knee AROM to at least 5-100 degrees in order to improve gait     Long Term Goals:  1. Pt will be independent with updated HEP  2. Pt will improve (L) LE strength to at least 90% of (R) LE strength as measured via MicroFet handheld dynamometer in order to improve functional mobility  3. Pt  will improve (L) knee AROM to at least 2-120 degrees in order to improve gait and ability to perform ADLs  4. Pt will improve FOTO knee survey score to </= 45% limited in order to demo improved functional mobility  5. Pt will improve score on Function,Daily Living section of KOOS to at least 31/68 ( 45% limited) in order to demo improved functional mobility  6. Pt will perform TUG in < 10 seconds without AD in order to demo improved gait speed  7. Pt will perform at least 10 sit to stands without UE support on 30 second sit to stand test in order to demo improved ability to perform transfers    Plan     Cont POC to progress towards established goals    Nicki Thurston PTA

## 2018-09-24 ENCOUNTER — TELEPHONE (OUTPATIENT)
Dept: GASTROENTEROLOGY | Facility: CLINIC | Age: 68
End: 2018-09-24

## 2018-09-24 ENCOUNTER — CLINICAL SUPPORT (OUTPATIENT)
Dept: REHABILITATION | Facility: HOSPITAL | Age: 68
End: 2018-09-24
Payer: COMMERCIAL

## 2018-09-24 ENCOUNTER — TELEPHONE (OUTPATIENT)
Dept: FAMILY MEDICINE | Facility: CLINIC | Age: 68
End: 2018-09-24

## 2018-09-24 DIAGNOSIS — M25.662 DECREASED ROM OF LEFT KNEE: ICD-10-CM

## 2018-09-24 DIAGNOSIS — G89.29 CHRONIC PAIN OF LEFT KNEE: ICD-10-CM

## 2018-09-24 DIAGNOSIS — M25.562 CHRONIC PAIN OF LEFT KNEE: ICD-10-CM

## 2018-09-24 DIAGNOSIS — R29.898 DECREASED STRENGTH OF LOWER EXTREMITY: ICD-10-CM

## 2018-09-24 DIAGNOSIS — R26.89 IMPAIRED GAIT AND MOBILITY: ICD-10-CM

## 2018-09-24 PROCEDURE — 97110 THERAPEUTIC EXERCISES: CPT | Mod: PN

## 2018-09-24 NOTE — TELEPHONE ENCOUNTER
----- Message from Sara Ruby sent at 9/24/2018  3:18 PM CDT -----  Contact: [phone] or [phone]/self  Patient requesting to speak with you regarding scheduling a colonoscopy. Please advise.

## 2018-09-24 NOTE — TELEPHONE ENCOUNTER
Advised that all colonoscopies are scheduled by GI dept.  Patient would need to contact gastroenterology to schedule.  Patient verbalized understanding.

## 2018-09-24 NOTE — TELEPHONE ENCOUNTER
----- Message from Lorie Huber sent at 9/24/2018  2:47 PM CDT -----  Contact: Self/ [phone]  Patient called to ask when is her procedure for colonoscopy.    Please call.

## 2018-09-24 NOTE — TELEPHONE ENCOUNTER
Attempted to return patient call about scheduling a Colonoscopy. Left patient a message to give office a call back.

## 2018-09-24 NOTE — PROGRESS NOTES
"DAILY TREATMENT NOTE    DATE: 9/24/2018    Start Time:  11:05 am   Stop Time:  12:12 pm     PROCEDURES:    TIMED  Procedure Min.   TE supervised 32' NC   TE 25'                  UNTIMED  Procedure Min.   CP 10'          Total Timed Minutes:  25'   Total Timed Units:  2  Total Untimed Units:  1  Charges Billed/# of units:  2 TE      Progress/Current Status    Subjective:     Patient ID: Huber Pena is a 68 y.o. female.  Diagnosis:   1. Chronic pain of left knee     2. Decreased ROM of left knee     3. Decreased strength of lower extremity     4. Impaired gait and mobility       Pain: 3 /10  Pt stated that she started exercising at the gym again yesterday.     Objective:     Huber received therapeutic exercises to develop strength and ROM for 25 minutes including objective measurements with PT 1:1 and supervised therex x 32' per log below. CP to (L) knee at end of session x 10' with (L) LE elevated.       Date  9/24/18 9/21/18 9/19/18 9/17/18   VISIT 14 13 12 11   FOTO         MT         Long Sit HS 3x30" L stairs 3x30" L stairs 3x30" stairs      [address] 3x30" DL fitter 3x30" DL fitter 3x30" fitter     Bike 5'  5' 5'  5'   QS   - 10x5" w/Beninese   SAQ   - 5' w/Beninese   SLR  2x10 2x10 10x w/min/CGA    SL Abd         Heel Slides 10"x10 w/straps 10"x10 w/straps 10"x10 w/straps 10x10" w/straps   Rox Hip Add         LAQs  2x15 2x15 2x10   Seated Hip Flex         Cybex   Leg Press 4.0 2x10  3.5 3x10 3.5 2x10  2x10 DL 3 plates   TKE PTC 2x15 L PTC 2x15 L PTC 2x15 L PTC 2x10 L   Hip Abd RTC 2x15  RTC 2x10 RTC 2x10  2x10 //   Hip Flex RTC 2x15 RTC 2x10 RTC 2x10  2x10 //   Hip Ext  RTC 2x10 RTC 2x10 2x10 //   Standing heel raises         Mini-squats  2x10 //   2x10 //   Knee Ext 15# 2x10 DL        Hamstring curls 3.0 2x10       Step Ups 2x10 blue  OOT 2x10 L1     Step Downs 2x10 L1 OOT 2x10 L1     Gait   note See note   CP 10        Initials CK SR 1/6 CK SR 1/6     Range of " Motion/Strength:    AROM: 0-104 degrees  PROM: NT - 108 degrees     L/E Strength w/ MicroFET Muscle [place] Dynamometer Right Left Pain/Dysfunction with Movement   (approx 4 sec hold w/ max contraction)   Hip Flexion 10.7 kg  9.5 kg      Hip Abduction 8.4 kg  9.0 kg      Quadriceps 11.2 kg  8.4 kg      Hamstrings 11.7 kg  9.2 kg         TUG:  10 seconds w/o AD  30 second sit to stand test: 10 w/UE support        Assessment:     Pt demo improved (L) knee flexion AROM and (L) LE strength compared to previous measurements taken. Pt tolerated all therex without any adverse reactions. Pt is making steady progress with her PT treatments.     Patient Education/Response:     Continue with HEP. Educated pt that she could perform the LE machines that she is performing in PT at the gym. Instructed pt to have someone who works at the gym show her how to properly set up/use machines and to start with very light weight and gradually increase weight as able to tolerate. Pt verbalized understanding. Also instructed pt to stop performing any machines at the gym if she experiences increased pain in (L) knee. Pt verbalized understanding.     Plans and Goals:   Continue per POC, progress as tolerated  Goals:   1. Pt will be independent with HEP  2. Pt will improve (L) LE strength to at least 75% of (R) LE strength as measured via MicroFet handheld dynamometer in order to improve functional mobility  3. Pt will improve (L) knee AROM to at least 5-100 degrees in order to improve gait     Long Term Goals:  1. Pt will be independent with updated HEP  2. Pt will improve (L) LE strength to at least 90% of (R) LE strength as measured via MicroFet handheld dynamometer in order to improve functional mobility  3. Pt will improve (L) knee AROM to at least 2-120 degrees in order to improve gait and ability to perform ADLs  4. Pt will improve FOTO knee survey score to </= 45% limited in order to demo improved functional mobility  5. Pt will improve  score on Function,Daily Living section of KOOS to at least 31/68 ( 45% limited) in order to demo improved functional mobility  6. Pt will perform TUG in < 10 seconds without AD in order to demo improved gait speed  7. Pt will perform at least 10 sit to stands without UE support on 30 second sit to stand test in order to demo improved ability to perform transfers

## 2018-09-25 ENCOUNTER — DOCUMENTATION ONLY (OUTPATIENT)
Dept: REHABILITATION | Facility: HOSPITAL | Age: 68
End: 2018-09-25

## 2018-09-25 NOTE — PROGRESS NOTES
Face to Face PTA Conference performed with  Sari Cueto PTA regarding patient's current status, overall progress, and plan of care.     Gail Davies, PT     Face to face meeting completed with Gail Davies PT regarding current status and progress of   Huber Pena .  Sari Cueto, PTA

## 2018-09-26 ENCOUNTER — CLINICAL SUPPORT (OUTPATIENT)
Dept: REHABILITATION | Facility: HOSPITAL | Age: 68
End: 2018-09-26
Payer: COMMERCIAL

## 2018-09-26 DIAGNOSIS — R26.89 IMPAIRED GAIT AND MOBILITY: ICD-10-CM

## 2018-09-26 DIAGNOSIS — M25.562 CHRONIC PAIN OF LEFT KNEE: ICD-10-CM

## 2018-09-26 DIAGNOSIS — R29.898 DECREASED STRENGTH OF LOWER EXTREMITY: ICD-10-CM

## 2018-09-26 DIAGNOSIS — M25.662 DECREASED ROM OF LEFT KNEE: ICD-10-CM

## 2018-09-26 DIAGNOSIS — G89.29 CHRONIC PAIN OF LEFT KNEE: ICD-10-CM

## 2018-09-26 PROCEDURE — 97110 THERAPEUTIC EXERCISES: CPT | Mod: PN

## 2018-09-26 NOTE — PROGRESS NOTES
"   Physical Therapy Daily Treatment Note     Name: Huber Pena  Clinic Number: [id number]    Therapy Diagnosis:   Encounter Diagnoses   Name Primary?    Chronic pain of left knee     Decreased ROM of left knee     Decreased strength of lower extremity     Impaired gait and mobility      Physician: Santiago Lomax MD    Visit Date: 9/26/2018    Physician Orders: PT eval and treat  Medical Diagnosis: left knee pain  Evaluation Date: 8/28/18  Authorization Period Expiration: 12/31/18  Plan of Care Certification Period:8/28/18 to 10/9/18   Visit #/Visits authorized: 15/ 30      Time In: 1110  Time Out: 1210  Total Billable Time: 50 minutes (TE-3)     Precautions: WBAT on (L) LE, osteopenia, hx of chest pain, hx of chest pain, hx of fainting/dizziness standard    Subjective     Pt reports: she went to gym and grocery this morning before therapy session and feeling a little sore and swollen.  She was compliant with home exercise program.  Response to previous treatment: no adverse reaction  Functional change: none    Pain: 3/10  Location: left knee      Objective     Huber received therapeutic exercises to develop strength and ROM for 50 minutes with PTA 1:1 including:  See log    Date  9/26/18 9/24/18 9/21/18 9/19/18   VISIT 15 14 13 12   FOTO          MT          Long Sit HS 3x30" L stairs 3x30" L stairs 3x30" L stairs 3x30" stairs    [address] 3x30" DL fitter 3x30" DL fitter 3x30" DL fitter 3x30" fitter   Bike 5' 5'  5' 5'    QS      -   SAQ      -   SLR 2x10   2x10 2x10   SL Abd          Heel Slides 10x10" w/straps 10"x10 w/straps 10"x10 w/straps 10"x10 w/straps   Rox Hip Add          LAQs 2x15   2x15 2x15   Seated Hip Flex          Cybex   Leg Press 4.0 2x10 4.0 2x10  3.5 3x10 3.5 2x10    TKE PTC 2x15 L PTC 2x15 L PTC 2x15 L PTC 2x15 L   Hip Abd RTC 2x15 RTC 2x15  RTC 2x10 RTC 2x10    Hip Flex RTC 2x15 RTC 2x15 RTC 2x10 RTC 2x10    Hip Ext    RTC 2x10 RTC 2x10   Standing heel raises          Mini-squats    2x10 " //     Knee Ext  15# 2x10 DL       Hamstring curls  3.0 2x10        Step Ups 2x10 blue 2x10 blue  OOT 2x10 L1   Step Downs 2x10 L1 2x10 L1 OOT 2x10 L1   Gait      note   CP 10 10       Initials SR 1/6 CK SR 1/6 CK      Huber received cold pack for 10 minutes to L knee.      Home Exercises Provided and Patient Education Provided     Education provided:   - cont HEP, avoid gym on days of therapy appts    Written Home Exercises Provided: Patient instructed to cont prior HEP.  Exercises were reviewed and Huber was able to demonstrate them prior to the end of the session.  Huber demonstrated good  understanding of the education provided.     See EMR under Patient Instructions for exercises provided prior visit.    Assessment     Pt tolerates therapy activities without c/o increased pain  Huber is progressing well towards her goals.   Pt prognosis is Excellent.     Pt will continue to benefit from skilled outpatient physical therapy to address the deficits listed in the problem list box on initial evaluation, provide pt/family education and to maximize pt's level of independence in the home and community environment.     Pt's spiritual, cultural and educational needs considered and pt agreeable to plan of care and goals.    Anticipated barriers to physical therapy: none    Goals:   1. Pt will be independent with HEP  2. Pt will improve (L) LE strength to at least 75% of (R) LE strength as measured via MicroFet handheld dynamometer in order to improve functional mobility  3. Pt will improve (L) knee AROM to at least 5-100 degrees in order to improve gait     Long Term Goals:  1. Pt will be independent with updated HEP  2. Pt will improve (L) LE strength to at least 90% of (R) LE strength as measured via MicroFet handheld dynamometer in order to improve functional mobility  3. Pt will improve (L) knee AROM to at least 2-120 degrees in order to improve gait and ability to perform ADLs  4. Pt will improve FOTO knee survey score  to </= 45% limited in order to demo improved functional mobility  5. Pt will improve score on Function,Daily Living section of KOOS to at least 31/68 ( 45% limited) in order to demo improved functional mobility  6. Pt will perform TUG in < 10 seconds without AD in order to demo improved gait speed  7. Pt will perform at least 10 sit to stands without UE support on 30 second sit to stand test in order to demo improved ability to perform transfers    Plan     Cont POC to progress towards established goals    Nicki Thurston PTA

## 2018-10-02 ENCOUNTER — CLINICAL SUPPORT (OUTPATIENT)
Dept: REHABILITATION | Facility: HOSPITAL | Age: 68
End: 2018-10-02
Payer: COMMERCIAL

## 2018-10-02 DIAGNOSIS — M25.662 DECREASED ROM OF LEFT KNEE: ICD-10-CM

## 2018-10-02 DIAGNOSIS — Z86.010 HISTORY OF COLON POLYPS: Primary | ICD-10-CM

## 2018-10-02 DIAGNOSIS — Z12.11 COLON CANCER SCREENING: ICD-10-CM

## 2018-10-02 DIAGNOSIS — M25.562 CHRONIC PAIN OF LEFT KNEE: ICD-10-CM

## 2018-10-02 DIAGNOSIS — R26.89 IMPAIRED GAIT AND MOBILITY: ICD-10-CM

## 2018-10-02 DIAGNOSIS — R29.898 DECREASED STRENGTH OF LOWER EXTREMITY: ICD-10-CM

## 2018-10-02 DIAGNOSIS — G89.29 CHRONIC PAIN OF LEFT KNEE: ICD-10-CM

## 2018-10-02 PROCEDURE — 97110 THERAPEUTIC EXERCISES: CPT | Mod: PN

## 2018-10-02 RX ORDER — POLYETHYLENE GLYCOL 3350, SODIUM SULFATE ANHYDROUS, SODIUM BICARBONATE, SODIUM CHLORIDE, POTASSIUM CHLORIDE 236; 22.74; 6.74; 5.86; 2.97 G/4L; G/4L; G/4L; G/4L; G/4L
4 POWDER, FOR SOLUTION ORAL ONCE
Qty: 4000 ML | Refills: 0 | Status: CANCELLED | OUTPATIENT
Start: 2018-10-02 | End: 2018-10-02

## 2018-10-02 NOTE — PROGRESS NOTES
"DAILY TREATMENT NOTE    DATE: 10/2/2018    Start Time:  11:00 am   Stop Time:  11:57 am     PROCEDURES:    TIMED  Procedure Min.   TE supervised 30'    TE 27'                  UNTIMED  Procedure Min.             Total Timed Minutes:  27'  Total Timed Units:  2  Total Untimed Units:  0  Charges Billed/# of units:  2 TE      Progress/Current Status    Subjective:     Patient ID: Huber Pena is a 68 y.o. female.  Diagnosis:   1. Chronic pain of left knee     2. Decreased ROM of left knee     3. Decreased strength of lower extremity     4. Impaired gait and mobility       Pain: 1 /10  Pt stated that she has been going to the gym, but is not doing LE exercises at gym on days that she comes to PT. Pt stated that she walked a 1/4 mile yesterday for the first time and experienced increased pain in (L) knee last night.     Objective:     Huber received therapeutic exercises to develop strength and ROM for 30 minutes supervised and 27' with PT 1:1 including:  See log    Date  10/2/18 9/26/18 9/24/18 9/21/18 9/19/18   VISIT 16 15 14 13 12   FOTO           MT           Long Sit HS 3x30" stairs 3x30" L stairs 3x30" L stairs 3x30" L stairs 3x30" stairs    [address] Str[address] 3x30" fitter 3x30" DL fitter 3x30" DL fitter 3x30" DL fitter 3x30" fitter   Bike 5'  5' 5'  5' 5'    QS       -   SAQ       -   SLR  2x10   2x10 2x10   SL Abd           Heel Slides  10x10" w/straps 10"x10 w/straps 10"x10 w/straps 10"x10 w/straps   Rox Hip Add           LAQs  2x15   2x15 2x15   Seated Hip Flex           Cybex   Leg Press 4.0 2x15 4.0 2x10 4.0 2x10  3.5 3x10 3.5 2x10    TKE PTC 2x15 L PTC 2x15 L PTC 2x15 L PTC 2x15 L PTC 2x15 L   Hip Abd held RTC 2x15 RTC 2x15  RTC 2x10 RTC 2x10    Hip Flex 2x10  RTC 2x15 RTC 2x15 RTC 2x10 RTC 2x10    Hip Ext 2x10     RTC 2x10 RTC 2x10   Standing heel raises           Mini-squats     2x10 //     Knee Ext 15# 2x15  15# 2x10 DL       Hamstring curls 3.0 2x15  3.0 2x10        Step Ups 2x15 blue 2x10 blue 2x10 blue  " OOT 2x10 L1   Step Downs 2x15 L1 2x10 L1 2x10 L1 OOT 2x10 L1   Gait       note   CP home 10 10       Initials CK SR 1/6 CK SR 1/6 CK       Assessment:     Pt reported increased pain in (L) knee when standing on (L) LE to perform (R) hip abduction, therefore remaining reps were held. Pt was able to perform (R) hip flexion and (R) hip extension without any resistance while standing on (L) LE. Pt tolerated all other therex without any c/o increased pain in (L) knee. Pt may be ready for DC next visit.     Patient Education/Response:     Continue with HEP. Pt was instructed to decrease distance walked on track and not to walk as far as 1/4 mile since she reported experiencing increased pain in (L) knee last night after walking 1/4 mile on track. Educated pt that she could gradually increase distance walked to 1/4 mile if she does not experience increased pain in (L) knee when walking shorter distance. Pt verbalized understanding.     Plans and Goals:   Continue per POC, progress as tolerated, Possible DC next visit   Goals:   1. Pt will be independent with HEP  2. Pt will improve (L) LE strength to at least 75% of (R) LE strength as measured via MicroFet handheld dynamometer in order to improve functional mobility  3. Pt will improve (L) knee AROM to at least 5-100 degrees in order to improve gait     Long Term Goals:  1. Pt will be independent with updated HEP  2. Pt will improve (L) LE strength to at least 90% of (R) LE strength as measured via MicroFet handheld dynamometer in order to improve functional mobility  3. Pt will improve (L) knee AROM to at least 2-120 degrees in order to improve gait and ability to perform ADLs  4. Pt will improve FOTO knee survey score to </= 45% limited in order to demo improved functional mobility  5. Pt will improve score on Function,Daily Living section of KOOS to at least 31/68 ( 45% limited) in order to demo improved functional mobility  6. Pt will perform TUG in < 10 seconds  without AD in order to demo improved gait speed  7. Pt will perform at least 10 sit to stands without UE support on 30 second sit to stand test in order to demo improved ability to perform transfers

## 2018-10-02 NOTE — TELEPHONE ENCOUNTER
Colonoscopy Referral   Referring Physician: Dr. Clayton  Date: 10/10/2018  Reason for Referral: History of Colon    Family History of: None  Colon polyp:  None  Relationship/Age of Onset: None  Colon cancer: None  Relationship/Age of Onset: NONE     Hemoccults Done: NO  Iron deficient: NO  On Blood Thinner: NO  Valvular heart disease/valve replacement:NO    Anemia Present: NO  On NSAID: YES  Lung disease: NO  Kidney disease: NO  Hx of polyps:YES  Hx of colon cancer: NO  Previous colon evalations: YES      When:   Where:   Pertinent symptoms:     Patient was scheduled for colonoscopy on 10/10/2018 with Dr. Whiteside at [facility]. Golytely instructions were reviewed with patient.

## 2018-10-05 ENCOUNTER — CLINICAL SUPPORT (OUTPATIENT)
Dept: REHABILITATION | Facility: HOSPITAL | Age: 68
End: 2018-10-05
Payer: COMMERCIAL

## 2018-10-05 DIAGNOSIS — M25.662 DECREASED ROM OF LEFT KNEE: ICD-10-CM

## 2018-10-05 DIAGNOSIS — M25.562 CHRONIC PAIN OF LEFT KNEE: ICD-10-CM

## 2018-10-05 DIAGNOSIS — R29.898 DECREASED STRENGTH OF LOWER EXTREMITY: ICD-10-CM

## 2018-10-05 DIAGNOSIS — R26.89 IMPAIRED GAIT AND MOBILITY: ICD-10-CM

## 2018-10-05 DIAGNOSIS — G89.29 CHRONIC PAIN OF LEFT KNEE: ICD-10-CM

## 2018-10-05 PROCEDURE — 97110 THERAPEUTIC EXERCISES: CPT | Mod: PN

## 2018-10-05 NOTE — PROGRESS NOTES
"DAILY TREATMENT NOTE    DATE: 10/5/2018    Start Time:  11:05 am   Stop Time:  11:55 am     PROCEDURES:    TIMED  Procedure Min.   TE supervised 25'    TE 25'                  UNTIMED  Procedure Min.             Total Timed Minutes:  25'   Total Timed Units:  2  Total Untimed Units:  0  Charges Billed/# of units:  2 TE      Progress/Current Status    Subjective:     Patient ID: Huber Pena is a 68 y.o. female.  Diagnosis:   1. Chronic pain of left knee     2. Decreased ROM of left knee     3. Decreased strength of lower extremity     4. Impaired gait and mobility       Pain: 1/10  Pt stated that her (L) knee has been hurting, but she has been icing her (L) knee. Pt stated that she took Tylenol prior to therapy session today.     Objective:     Huber received therapeutic exercises to develop strength and ROM for 30 minutes supervised and 27' with PT 1:1 including:  See log    Date  10/5/18 10/2/18 9/26/18 9/24/18 9/21/18 9/19/18   VISIT 17 16 15 14 13 12   FOTO            MT            Long Sit HS  3x30" stairs 3x30" L stairs 3x30" L stairs 3x30" L stairs 3x30" stairs    Gast[address] Str[address]  3x30" fitter 3x30" DL fitter 3x30" DL fitter 3x30" DL fitter 3x30" fitter   Bike  5'  5' 5'  5' 5'    QS        -   SAQ        -   SLR   2x10   2x10 2x10   SL Abd            Heel Slides   10x10" w/straps 10"x10 w/straps 10"x10 w/straps 10"x10 w/straps   Rox Hip Add            LAQs   2x15   2x15 2x15   Seated Hip Flex            Cybex   Leg Press 4.0 2x15 4.0 2x15 4.0 2x10 4.0 2x10  3.5 3x10 3.5 2x10    TKE PTC 2x15 L PTC 2x15 L PTC 2x15 L PTC 2x15 L PTC 2x15 L PTC 2x15 L   Hip Abd 2x10  held RTC 2x15 RTC 2x15  RTC 2x10 RTC 2x10    Hip Flex 2x10  2x10  RTC 2x15 RTC 2x15 RTC 2x10 RTC 2x10    Hip Ext 2x10  2x10     RTC 2x10 RTC 2x10   Standing heel raises            Mini-squats      2x10 //     Knee Ext 15# 2x15 15# 2x15  15# 2x10 DL       Hamstring curls 3.0 2x15 3.0 2x15  3.0 2x10        Step Ups 2x15 blue 2x15 blue 2x10 blue 2x10 " blue  OOT 2x10 L1   Step Downs 2x15 L1 2x15 L1 2x10 L1 2x10 L1 OOT 2x10 L1   Gait Stairs 2x - one step at a time w/ 1 handrail       note   CP home home 10 10       Initials CK  CK SR 1/6 CK SR 1/6 CK          Assessment:     See DC summary below    Patient Education/Response:     See DC summary below    Plans and Goals:     DC from PT     REHAB SERVICES OUTPATIENT DISCHARGE SUMMARY  Physical Therapy      Name:  Huber Pena  Date:  10/5/18  Date of Evaluation:  8/28/18  Physician:  Santiago Lomax MD  Total # Of Visits:  17  Diagnosis:    1. Chronic pain of left knee     2. Decreased ROM of left knee     3. Decreased strength of lower extremity     4. Impaired gait and mobility         Physical/Functional Status:  At time of discharge, patient had attended 16 PT follow up sessions since initial evaluation on 8/28/18. Pt had made steady progress with her PT treatments as evident by subjective and objective improvements. Pt demo significant improvement in (L) knee AROM, (L) LE strength, functional mobility, and is able to ambulate without AD. Pt reports compliance with HEP and that she has been exercising (B) LE at gym on days not attending PT. Skilled PT services no longer required. Pt was agreeable to being discharged with HEP and continuing to exercise (B) LE at gym. See objective measurements below.       Range of Motion/Strength:    AROM: 0-112 degrees  PROM: NT - 115 degrees     L/E Strength w/ MicroFET Muscle Luther Dynamometer Right Left Pain/Dysfunction with Movement   (approx 4 sec hold w/ max contraction)   Hip Flexion 12.1 kg  11.1 kg      Hip Abduction 12.4 kg  11.2 kg      Quadriceps 11.3 kg  11.2 kg      Hamstrings 14.4 kg  11.2 kg         TUG:  10 seconds w/o AD  30 second sit to stand test: 12 w/UE support  FOTO: 61 % limited   Function, Daily Living Section of KOOS: 18/ 68 ( 26% limited)     The patient is to be discharged from our Therapy service for the following reason(s):  Patient has reached the  maximum rehab potential for the present time    Degree of Goal Achievement:  Patient has partially met goals    Goals:   1. Pt will be independent with HEP - MET  2. Pt will improve (L) LE strength to at least 75% of (R) LE strength as measured via MicroFet handheld dynamometer in order to improve functional mobility - MET  3. Pt will improve (L) knee AROM to at least 5-100 degrees in order to improve gait - MET     Long Term Goals:  1. Pt will be independent with updated HEP - MET  2. Pt will improve (L) LE strength to at least 90% of (R) LE strength as measured via MicroFet handheld dynamometer in order to improve functional mobility - PARTIALLY MET  3. Pt will improve (L) knee AROM to at least 2-120 degrees in order to improve gait and ability to perform ADLs - PARTIALLY MET  4. Pt will improve FOTO knee survey score to </= 45% limited in order to demo improved functional mobility - NOT MET  5. Pt will improve score on Function,Daily Living section of KOOS to at least 31/68 ( 45% limited) in order to demo improved functional mobility - MET  6. Pt will perform TUG in < 10 seconds without AD in order to demo improved gait speed - NOT MET  7. Pt will perform at least 10 sit to stands without UE support on 30 second sit to stand test in order to demo improved ability to perform transfers - NOT MET    Patient Education:  Pt was educated on and given handout on updated HEP. Pt demo/verbalized understanding.     Discharge Plan:  Home Program:  Continue with HEP, continue to exercise (B) LE at gym, follow up with MD prn

## 2018-10-08 ENCOUNTER — TELEPHONE (OUTPATIENT)
Dept: ENDOSCOPY | Facility: HOSPITAL | Age: 68
End: 2018-10-08

## 2018-10-08 ENCOUNTER — TELEPHONE (OUTPATIENT)
Dept: GASTROENTEROLOGY | Facility: CLINIC | Age: 68
End: 2018-10-08

## 2018-10-08 NOTE — TELEPHONE ENCOUNTER
----- Message from Alexy Clayton sent at 10/8/2018  3:10 PM CDT -----  Contact: [phone]/self  Patient requesting to speak with you concerning insurance coding for her upcoming procedure.  Please call and advise

## 2018-10-08 NOTE — TELEPHONE ENCOUNTER
Spoke with patient about her prep instructions. Patient wanted to know if she can take Asprin because she had knee surgery 6 weeks ago. Patient informed her it was fine. Patient also stated that nobody never called her with an time of arrival for her procedure. Staff informed her that they call 2 or 3 days before her procedure with a time.

## 2018-10-08 NOTE — TELEPHONE ENCOUNTER
Spoke with patient about arrival time @1100 .     Prep instructions reviewed: the day before the procedure, follow a clear liquid diet all day, then start the first 1/2 of prep at 5pm and take 2nd 1/2 of prep @0500     .  Pt must be completely NPO when prep completed @0600          .    Medications: Do not take Insulin or oral diabetic medications the day of the procedure.  Take as prescribed: heart, seizure and blood pressure medication in the morning with a sip of water (less than an ounce).  Take any breathing medications and bring inhalers to hospital with you Leave all valuables and jewelry at home.     Wear comfortable clothes to procedure to change into hospital gown You cannot drive for 24 hours after your procedure because you will receive sedation for your procedure to make you comfortable.  A ride must be provided at discharge.

## 2018-10-08 NOTE — TELEPHONE ENCOUNTER
----- Message from Sara Ruby sent at 10/8/2018 11:06 AM CDT -----  Contact: [phone]/self  Patient requesting to speak with you regarding instructions for her procedure on 10/10/18. Please call and advise.

## 2018-10-08 NOTE — TELEPHONE ENCOUNTER
Spoke with patient. Patient wanted to know if the coding for her insurance would be put in correctly. Staff informed patient that she would need to call the billing department to inform them for her situation.

## 2018-10-10 ENCOUNTER — ANESTHESIA (OUTPATIENT)
Dept: ENDOSCOPY | Facility: HOSPITAL | Age: 68
End: 2018-10-10
Payer: COMMERCIAL

## 2018-10-10 ENCOUNTER — ANESTHESIA EVENT (OUTPATIENT)
Dept: ENDOSCOPY | Facility: HOSPITAL | Age: 68
End: 2018-10-10
Payer: COMMERCIAL

## 2018-10-10 ENCOUNTER — HOSPITAL ENCOUNTER (OUTPATIENT)
Facility: HOSPITAL | Age: 68
Discharge: HOME | End: 2018-10-10
Attending: INTERNAL MEDICINE | Admitting: INTERNAL MEDICINE
Payer: COMMERCIAL

## 2018-10-10 VITALS
RESPIRATION RATE: 20 BRPM | TEMPERATURE: 98 F | SYSTOLIC BLOOD PRESSURE: 148 MMHG | BODY MASS INDEX: 26.84 KG/M2 | WEIGHT: 171 LBS | HEART RATE: 70 BPM | HEIGHT: 67 IN | OXYGEN SATURATION: 100 % | DIASTOLIC BLOOD PRESSURE: 72 MMHG

## 2018-10-10 DIAGNOSIS — Z12.11 SCREENING FOR COLON CANCER: ICD-10-CM

## 2018-10-10 PROCEDURE — G0105 COLORECTAL SCRN; HI RISK IND: HCPCS | Performed by: INTERNAL MEDICINE

## 2018-10-10 PROCEDURE — 63600175 PHARM REV CODE 636 W HCPCS: Performed by: NURSE ANESTHETIST, CERTIFIED REGISTERED

## 2018-10-10 PROCEDURE — 37000008 HC ANESTHESIA 1ST 15 MINUTES: Performed by: INTERNAL MEDICINE

## 2018-10-10 PROCEDURE — G0105 COLORECTAL SCRN; HI RISK IND: HCPCS | Mod: ,,, | Performed by: INTERNAL MEDICINE

## 2018-10-10 PROCEDURE — 25000003 PHARM REV CODE 250: Performed by: INTERNAL MEDICINE

## 2018-10-10 PROCEDURE — 37000009 HC ANESTHESIA EA ADD 15 MINS: Performed by: INTERNAL MEDICINE

## 2018-10-10 RX ORDER — ACETAMINOPHEN 500 MG
1000 TABLET ORAL EVERY 6 HOURS PRN
COMMUNITY
End: 2018-12-11

## 2018-10-10 RX ORDER — PROPOFOL 10 MG/ML
VIAL (ML) INTRAVENOUS
Status: DISCONTINUED | OUTPATIENT
Start: 2018-10-10 | End: 2018-10-10

## 2018-10-10 RX ORDER — SODIUM CHLORIDE 0.9 % (FLUSH) 0.9 %
3 SYRINGE (ML) INJECTION
Status: DISCONTINUED | OUTPATIENT
Start: 2018-10-10 | End: 2018-10-10 | Stop reason: HOSPADM

## 2018-10-10 RX ORDER — SODIUM CHLORIDE 9 MG/ML
INJECTION, SOLUTION INTRAVENOUS CONTINUOUS
Status: DISCONTINUED | OUTPATIENT
Start: 2018-10-10 | End: 2018-10-10 | Stop reason: HOSPADM

## 2018-10-10 RX ORDER — PROPOFOL 10 MG/ML
VIAL (ML) INTRAVENOUS CONTINUOUS PRN
Status: DISCONTINUED | OUTPATIENT
Start: 2018-10-10 | End: 2018-10-10

## 2018-10-10 RX ORDER — LIDOCAINE HCL/PF 100 MG/5ML
SYRINGE (ML) INTRAVENOUS
Status: DISCONTINUED | OUTPATIENT
Start: 2018-10-10 | End: 2018-10-10

## 2018-10-10 RX ADMIN — LIDOCAINE HYDROCHLORIDE 80 MG: 20 INJECTION, SOLUTION INTRAVENOUS at 01:10

## 2018-10-10 RX ADMIN — SODIUM CHLORIDE: 0.9 INJECTION, SOLUTION INTRAVENOUS at 12:10

## 2018-10-10 RX ADMIN — PROPOFOL 50 MG: 10 INJECTION, EMULSION INTRAVENOUS at 01:10

## 2018-10-10 RX ADMIN — PROPOFOL 150 MCG/KG/MIN: 10 INJECTION, EMULSION INTRAVENOUS at 01:10

## 2018-10-10 NOTE — PLAN OF CARE
Past Medical History:   Diagnosis Date    Anxiety     Chronic constipation     DDD (degenerative disc disease), lumbar     Fibroid tumor     Hyperplastic colon polyp     Osteopenia     Pes planus      Colonoscopy completed with normal results noted. Dr. Whiteside visited at bedside prior to discharge, discussed findings and recommendations with patient and family member; all questions asked and answered. Verbalized understanding of information give. Handout provided at time of discharge. Tolerated procedure well, no post op complications noted.

## 2018-10-10 NOTE — TRANSFER OF CARE
"Anesthesia Transfer of Care Note    Patient: Huber Pena    Procedure(s) Performed: Procedure(s) (LRB):  COLONOSCOPY (N/A)    Patient location: GI    Anesthesia Type: MAC    Transport from OR: Transported from OR on room air with adequate spontaneous ventilation    Post pain: adequate analgesia    Post assessment: no apparent anesthetic complications and tolerated procedure well    Post vital signs: stable    Level of consciousness: awake, alert and oriented    Nausea/Vomiting: no nausea/vomiting    Complications: none    Transfer of care protocol was followed      Last vitals:   Visit Vitals  /78   Pulse 89   Temp 36.6 °C (97.9 °F) (Temporal)   Resp 20   Ht 5' 6.5" (1.689 m)   Wt 77.6 kg (171 lb)   LMP  (LMP Unknown)   SpO2 95%   Breastfeeding? No   BMI 27.19 kg/m²     "

## 2018-10-10 NOTE — PLAN OF CARE
Admission process complete. Patient ready for procedure. Plan of care reviewed with patient. Patient has concerns about positioning because of recent TKA. Addressed concern and information given to procedural RN. Preoperative fasting appropriate for procedure and sedation. Call light in reach and bed in lowest position.

## 2018-10-10 NOTE — [employer] PATIENT INSTRUCTIONS
Discharge Summary/Instructions after an Endoscopic Procedure  Patient Name: Huber Pena  Patient MRN: [id number]  Patient YOB: 1950  Wednesday, October 10, 2018  Cristal Whiteside MD  RESTRICTIONS:  During your procedure today, you received medications for sedation.  These   medications may affect your judgment, balance and coordination.  Therefore,   for 24 hours, you have the following restrictions:   - DO NOT drive a car, operate machinery, make legal/financial decisions,   sign important papers or drink alcohol.    ACTIVITY:  Today: no heavy lifting, straining or running due to procedural   sedation/anesthesia.  The following day: return to full activity including work.  DIET:  Eat and drink normally unless instructed otherwise.     TREATMENT FOR COMMON SIDE EFFECTS:  - Mild abdominal pain, nausea, belching, bloating or excessive gas:  rest,   eat lightly and use a heating pad.  - Sore Throat: treat with throat lozenges and/or gargle with warm salt   water.  - Because air was used during the procedure, expelling large amounts of air   from your rectum or belching is normal.  - If a bowel prep was taken, you may not have a bowel movement for 1-3 days.    This is normal.  SYMPTOMS TO WATCH FOR AND REPORT TO YOUR PHYSICIAN:  1. Abdominal pain or bloating, other than gas cramps.  2. Chest pain.  3. Back pain.  4. Signs of infection such as: chills or fever occurring within 24 hours   after the procedure.  5. Rectal bleeding, which would show as bright red, maroon, or black stools.   (A tablespoon of blood from the rectum is not serious, especially if   hemorrhoids are present.)  6. Vomiting.  7. Weakness or dizziness.  GO DIRECTLY TO THE NEAREST EMERGENCY ROOM IF YOU HAVE ANY OF THE FOLLOWING:      Difficulty breathing              Chills and/or fever over 101 F   Persistent vomiting and/or vomiting blood   Severe abdominal pain   Severe chest pain   Black, tarry stools   Bleeding- more than one  tablespoon   Any other symptom or condition that you feel may need urgent attention  Your doctor recommends these additional instructions:  If any biopsies were taken, your doctors clinic will contact you in 1 to 2   weeks with any results.  - Discharge patient to home.   - Repeat colonoscopy in 5 years for surveillance.  For questions, problems or results please call your physician - Cristal Whiteside MD at Work:  [phone].  EMERGENCY PHONE NUMBER: [phone],  LAB RESULTS: [phone]  IF A COMPLICATION OR EMERGENCY SITUATION ARISES AND YOU ARE UNABLE TO REACH   YOUR PHYSICIAN - GO DIRECTLY TO THE EMERGENCY ROOM.  Cristal Whiteside MD  10/10/2018 1:17:20 PM  This report has been verified and signed electronically.  [employer]

## 2018-10-10 NOTE — H&P
History and Physical      Chief complaints: Requesting screening colonscopy    History of Presenting Illness    Patient requesting colonoscopy.  Patient denies any abdominal pain, weight loss or blood in the stool.  There is a family history of colon cancer and a personal history of colon polyp.    Review of Systems   Constitutional: Negative for fever and appetite change.   HENT: Negative for sore throat, trouble swallowing and neck pain.   Eyes: Negative for photophobia and visual disturbance.   Respiratory: Negative for wheezing.   Cardiovascular: Negative for chest pain and palpitations.   Gastrointestinal: See HPI for details   Musculoskeletal: Negative for joint swelling and arthralgias.   Skin: Negative for rash and wound.   Neurological: Negative for dizziness, tremors and weakness.   Hematological: Negative.   Psychiatric/Behavioral: Negative for suicidal ideas and behavioral problems.     Past Medical History:   Diagnosis Date    Anxiety     Chronic constipation     DDD (degenerative disc disease), lumbar     Fibroid tumor     Hyperplastic colon polyp     Osteopenia     Pes planus        Past Surgical History:   Procedure Laterality Date    ARTHROPLASTY, KNEE, SIGHT ASSISTED Left 8/27/2018    Performed by [facility]ax [facility] at [facility] OR    BREAST BIOPSY      left breast    BREAST RECONSTRUCTION      Breast reduction  2004    COLONOSCOPY N/A 7/31/2013    Performed by Cristal Whiteside MD at [facility] ENDO    CRYOTHERAPY, NERVE, PERIPHERAL, PERCUTANEOUS, USING LIQUID NITROUS OXIDE IN CLOSED NEEDLE DEVICE Left 8/23/2018    Performed by FRANK Null at [facility] OR    PERCUTANEOUS CRYOTHERAPY OF PERIPHERAL NERVE USING LIQUID NITROUS OXIDE IN CLOSED NEEDLE DEVICE Left 8/23/2018    Procedure: CRYOTHERAPY, NERVE, PERIPHERAL, PERCUTANEOUS, USING LIQUID NITROUS OXIDE IN CLOSED NEEDLE DEVICE;  Surgeon: FRANK Null;  Location: [facility] OR;  Service: Orthopedics;  Laterality: Left;    TOTAL KNEE  ARTHROPLASTY Left     TUBAL LIGATION         Family History   Problem Relation Age of Onset    Breast cancer Maternal Aunt     Colon cancer Cousin     Diabetes Sister         x2    Diabetes Mother     No Known Problems Father     No Known Problems Brother     No Known Problems Maternal Uncle     No Known Problems Paternal Aunt     No Known Problems Paternal Uncle     No Known Problems Maternal Grandmother     No Known Problems Maternal Grandfather     No Known Problems Paternal Grandmother     No Known Problems Paternal Grandfather     Amblyopia Neg Hx     Blindness Neg Hx     Cancer Neg Hx     Cataracts Neg Hx     Glaucoma Neg Hx     Hypertension Neg Hx     Macular degeneration Neg Hx     Retinal detachment Neg Hx     Strabismus Neg Hx     Stroke Neg Hx     Thyroid disease Neg Hx        Social History     Socioeconomic History    Marital status:      Spouse name: None    Number of children: None    Years of education: None    Highest education level: None   Social Needs    Financial resource strain: None    Food insecurity - worry: None    Food insecurity - inability: None    Transportation needs - medical: None    Transportation needs - non-medical: None   Occupational History    None   Tobacco Use    Smoking status: Never Smoker    Smokeless tobacco: Never Used   Substance and Sexual Activity    Alcohol use: No     Alcohol/week: 0.0 oz     Frequency: Never     Binge frequency: Never    Drug use: No    Sexual activity: Yes     Partners: Male     Birth control/protection: Post-menopausal   Other Topics Concern    Are you pregnant or think you may be? Not Asked    Breast-feeding Not Asked   Social History Narrative    None       Current Facility-Administered Medications   Medication Dose Route Frequency Provider Last Rate Last Dose    0.9%  NaCl infusion   Intravenous Continuous Cristal Whiteside MD 20 mL/hr at 10/10/18 1221      sodium chloride 0.9% flush 3  mL  3 mL Intravenous PRN Cirstal Whiteside MD           Review of patient's allergies indicates:  No Known Allergies    Objective:      Vitals:    10/10/18 1212   BP: 133/78   Pulse: 89   Resp: 20   Temp: 97.9 °F (36.6 °C)     Physical Exam   Constitutional: Patient is oriented to person, place, and time. Appears well-nourished.   HENT:   Mouth/Throat: Oropharynx is clear and moist.   Eyes: Pupils are equal, round, and reactive to light.   Neck: Neck supple.   Cardiovascular: Normal heart sounds.   Pulmonary/Chest: Effort normal and breath sounds normal.   Abdominal: Soft. Exhibits no mass. There is no tenderness. There is no guarding.   Musculoskeletal: Normal range of motion.   Lymphadenopathy: Has no cervical adenopathy.   Neurological:Alert and oriented to person, place, and time.   Skin: Skin is warm. No rash noted.   Psychiatric: Has a normal mood and affect.     Assessment:  Personal history of colon polyps  Family history of colon cancer    Plan:  Colonoscopy       I have reviewed the patient's medical history in detail and updated the computerized patient record

## 2018-10-10 NOTE — ANESTHESIA POSTPROCEDURE EVALUATION
"Anesthesia Post Evaluation    Patient: Huber Pena    Procedure(s) Performed: Procedure(s) (LRB):  COLONOSCOPY (N/A)    Final Anesthesia Type: MAC  Patient location during evaluation: GI PACU  Patient participation: Yes- Able to Participate  Level of consciousness: awake and alert and oriented  Post-procedure vital signs: reviewed and stable  Pain management: adequate  Airway patency: patent  PONV status at discharge: No PONV  Anesthetic complications: no      Cardiovascular status: blood pressure returned to baseline, hemodynamically stable and stable  Respiratory status: unassisted, spontaneous ventilation and room air  Hydration status: euvolemic  Follow-up not needed.        Visit Vitals  /78   Pulse 89   Temp 36.6 °C (97.9 °F) (Temporal)   Resp 20   Ht 5' 6.5" (1.689 m)   Wt 77.6 kg (171 lb)   LMP  (LMP Unknown)   SpO2 95%   Breastfeeding? No   BMI 27.19 kg/m²       Pain/Phil Score: No Data Recorded      "

## 2018-10-18 ENCOUNTER — CLINICAL SUPPORT (OUTPATIENT)
Dept: FAMILY MEDICINE | Facility: CLINIC | Age: 68
End: 2018-10-18
Payer: COMMERCIAL

## 2018-10-18 DIAGNOSIS — Z23 FLU VACCINE NEED: Primary | ICD-10-CM

## 2018-10-18 PROCEDURE — 90662 IIV NO PRSV INCREASED AG IM: CPT | Mod: S$GLB,,, | Performed by: FAMILY MEDICINE

## 2018-10-18 PROCEDURE — 90471 IMMUNIZATION ADMIN: CPT | Mod: S$GLB,,, | Performed by: FAMILY MEDICINE

## 2018-11-15 ENCOUNTER — TELEPHONE (OUTPATIENT)
Dept: FAMILY MEDICINE | Facility: CLINIC | Age: 68
End: 2018-11-15

## 2018-11-15 DIAGNOSIS — Z00.00 ROUTINE GENERAL MEDICAL EXAMINATION AT A HEALTH CARE FACILITY: ICD-10-CM

## 2018-11-15 DIAGNOSIS — M85.80 OSTEOPENIA, UNSPECIFIED LOCATION: ICD-10-CM

## 2018-11-15 DIAGNOSIS — Z12.31 ENCOUNTER FOR SCREENING MAMMOGRAM FOR BREAST CANCER: Primary | ICD-10-CM

## 2018-11-19 ENCOUNTER — TELEPHONE (OUTPATIENT)
Dept: FAMILY MEDICINE | Facility: CLINIC | Age: 68
End: 2018-11-19

## 2018-11-19 NOTE — TELEPHONE ENCOUNTER
Orders Placed This Encounter   Procedures    Mammo Digital Screening Bilat    CBC auto differential    Comprehensive metabolic panel    Lipid panel    TSH     Prior to phys appt

## 2018-11-20 ENCOUNTER — TELEPHONE (OUTPATIENT)
Dept: FAMILY MEDICINE | Facility: CLINIC | Age: 68
End: 2018-11-20

## 2018-11-20 ENCOUNTER — HOSPITAL ENCOUNTER (OUTPATIENT)
Dept: RADIOLOGY | Facility: HOSPITAL | Age: 68
Discharge: HOME | End: 2018-11-20
Attending: PODIATRIST
Payer: MEDICARE

## 2018-11-20 ENCOUNTER — OFFICE VISIT (OUTPATIENT)
Dept: PODIATRY | Facility: CLINIC | Age: 68
End: 2018-11-20
Payer: MEDICARE

## 2018-11-20 VITALS
WEIGHT: 171 LBS | BODY MASS INDEX: 26.84 KG/M2 | SYSTOLIC BLOOD PRESSURE: 130 MMHG | DIASTOLIC BLOOD PRESSURE: 79 MMHG | RESPIRATION RATE: 18 BRPM | HEIGHT: 67 IN | HEART RATE: 64 BPM

## 2018-11-20 DIAGNOSIS — M79.671 FOOT PAIN, RIGHT: ICD-10-CM

## 2018-11-20 DIAGNOSIS — M79.671 FOOT PAIN, RIGHT: Primary | ICD-10-CM

## 2018-11-20 DIAGNOSIS — M21.41 FLAT FOOT, ACQUIRED, RIGHT: ICD-10-CM

## 2018-11-20 DIAGNOSIS — M19.079 ARTHRITIS OF MIDTARSAL JOINT: ICD-10-CM

## 2018-11-20 PROCEDURE — 99999 PR PBB SHADOW E&M-EST. PATIENT-LVL III: CPT | Mod: PBBFAC,,, | Performed by: PODIATRIST

## 2018-11-20 PROCEDURE — 73630 X-RAY EXAM OF FOOT: CPT | Mod: 26,RT,, | Performed by: RADIOLOGY

## 2018-11-20 PROCEDURE — 73630 X-RAY EXAM OF FOOT: CPT | Mod: TC,RT

## 2018-11-20 PROCEDURE — 99213 OFFICE O/P EST LOW 20 MIN: CPT | Mod: S$PBB,,, | Performed by: PODIATRIST

## 2018-11-20 PROCEDURE — 99213 OFFICE O/P EST LOW 20 MIN: CPT | Mod: PBBFAC,25 | Performed by: PODIATRIST

## 2018-11-20 RX ORDER — TIMOLOL MALEATE 5 MG/ML
1 SOLUTION/ DROPS OPHTHALMIC 2 TIMES DAILY
Refills: 5 | COMMUNITY
Start: 2018-10-22 | End: 2022-08-09

## 2018-11-20 RX ORDER — TRAMADOL HYDROCHLORIDE 50 MG/1
50 TABLET ORAL EVERY 8 HOURS PRN
Refills: 0 | COMMUNITY
Start: 2018-09-13 | End: 2018-12-11

## 2018-11-20 RX ORDER — DORZOLAMIDE HCL 20 MG/ML
1 SOLUTION/ DROPS OPHTHALMIC 2 TIMES DAILY
Refills: 5 | COMMUNITY
Start: 2018-10-22 | End: 2019-04-01

## 2018-11-20 NOTE — TELEPHONE ENCOUNTER
Advised patient that her labs and mammogram were scheduled for 12/4.  Patient verbalized understanding.

## 2018-11-20 NOTE — TELEPHONE ENCOUNTER
----- Message from Amita Dimas sent at 11/20/2018  9:11 AM CST -----  Contact: Self [phone]  Patient Returning Your Phone Call

## 2018-11-20 NOTE — PROGRESS NOTES
Subjective:      Patient ID: Huber Pena is a 68 y.o. female.    Chief Complaint: Follow-up (Rt foot pain ) and Foot Problem (top of the foot )    Huber is a 68 y.o. female who presents to the podiatry clinic  with complaint of  right foot pain. Onset of the symptoms was several months ago. Precipitating event: none known. Current symptoms include: ability to bear weight, but with some pain, stiffness, swelling and worsening symptoms after a period of activity. Aggravating factors: any weight bearing. Symptoms have gradually worsened. Patient has had prior foot problems. Evaluation to date: none. Treatment to date: avoidance of offending activity.     11/21/18: pt reports for f/u r foot pain. Has had L knee replacement since last visit. Gary has increased pressures to r foot while rehabbing.  Has arch pains r foot which limit her walking/standng ability       Patient Active Problem List   Diagnosis    Osteopenia    Vitamin D insufficiency    Chronic constipation    Pes planus    Fibrocystic breast disease    Family history of diabetes mellitus    Family history of breast cancer    Disc disease, degenerative, lumbar or lumbosacral    Family history of colon cancer    Mastalgia in female    Anxiety    Primary osteoarthritis of left knee    Arthritis of knee    Chronic pain of left knee    Decreased ROM of left knee    Decreased strength of lower extremity    Impaired gait and mobility    Screening for colon cancer       Current Outpatient Medications on File Prior to Visit   Medication Sig Dispense Refill    acetaminophen (TYLENOL) 500 MG tablet Take 1,000 mg by mouth every 6 (six) hours as needed for Pain.      alprazolam (XANAX) 0.5 MG tablet Take 1 tablet (0.5 mg total) by mouth as needed. 30 tablet 2    docusate sodium (COLACE) 50 MG capsule Take 1 capsule (50 mg total) by mouth every 6 (six) hours. 144 capsule 0    dorzolamide (TRUSOPT) 2 % ophthalmic solution Place 1 drop into  both eyes 2 (two) times daily.  5    dorzolamide-timolol 2-0.5% (COSOPT) 22.3-6.8 mg/mL ophthalmic solution USE 1 DROP INTO BOTH EYES TWICE A DAY  5    fluticasone (FLONASE) 50 mcg/actuation nasal spray SPRAY 2 SPRAYS IN EACH NOSTRIL ONCE A DAY 1 Bottle 6    HYDROcodone-acetaminophen (NORCO) 5-325 mg per tablet Take 1 tablet by mouth every 6 (six) hours as needed for Pain. 28 tablet 0    meclizine (ANTIVERT) 25 mg tablet Take 1 tablet (25 mg total) by mouth 3 (three) times daily as needed for Dizziness or Nausea. 30 tablet 2    meloxicam (MOBIC) 7.5 MG tablet Take 1 tablet (7.5 mg total) by mouth daily as needed. 30 tablet 2    nystatin (MYCOSTATIN) cream Apply topically to affected area 2 (two) times daily. 30 g 0    polyethylene glycol (GOLYTELY,NULYTELY) 236-22.74-6.74 -5.86 gram suspension take as instructed 4000 mL 0    timolol maleate 0.5% (TIMOPTIC) 0.5 % Drop Place 1 drop into both eyes 2 (two) times daily.  5    traMADol (ULTRAM) 50 mg tablet Take 50 mg by mouth every 8 (eight) hours as needed.  0    aspirin (ECOTRIN) 81 MG EC tablet Take 1 tablet (81 mg total) by mouth 2 (two) times daily. 84 tablet 0    famotidine (PEPCID) 20 MG tablet Take 1 tablet (20 mg total) by mouth 2 (two) times daily. 84 tablet 0     No current facility-administered medications on file prior to visit.        Review of patient's allergies indicates:  No Known Allergies    Past Surgical History:   Procedure Laterality Date    ARTHROPLASTY, KNEE, SIGHT ASSISTED Left 8/27/2018    [facility] OR    BREAST BIOPSY      left breast    BREAST RECONSTRUCTION      Breast reduction  2004    COLONOSCOPY N/A 10/10/2018    Procedure: COLONOSCOPY;  Surgeon: Cristal Whiteside MD;  Location[facility];  Service: Endoscopy;  Laterality: N/A;    COLONOSCOPY N/A 10/10/2018    Per[facility] at [facility] ENDO    COLONOSCOPY N/A 7/31/2013    Per[facility] by [facility] at [facility]  ENDO    CRYOTHERAPY, NERVE, PERIPHERAL, PERCUTANEOUS, USING LIQUID NITROUS OXIDE IN CLOSED NEEDLE DEVICE Left 8/23/2018    Performed by FR[facility] at [facility] OR    PERCUTANEOUS CRYOTHERAPY OF PERIPHERAL NERVE USING LIQUID NITROUS OXIDE IN CLOSED NEEDLE DEVICE Left 8/23/2018    Procedure: CRYOTHERAPY, NERVE, PERIPHERAL, PERCUTANEOUS, USING LIQUID NITROUS OXIDE IN CLOSED NEEDLE DEVICE;  Surgeon: FRANK Null;  Location: [facility] OR;  Service: Orthopedics;  Laterality: Left;    TOTAL KNEE ARTHROPLASTY Left     TUBAL LIGATION         Family History   Problem Relation Age of Onset    Breast cancer Maternal Aunt     Colon cancer Cousin     Diabetes Sister         x2    Diabetes Mother     No Known Problems Father     No Known Problems Brother     No Known Problems Maternal Uncle     No Known Problems Paternal Aunt     No Known Problems Paternal Uncle     No Known Problems Maternal Grandmother     No Known Problems Maternal Grandfather     No Known Problems Paternal Grandmother     No Known Problems Paternal Grandfather     Amblyopia Neg Hx     Blindness Neg Hx     Cancer Neg Hx     Cataracts Neg Hx     Glaucoma Neg Hx     Hypertension Neg Hx     Macular degeneration Neg Hx     Retinal detachment Neg Hx     Strabismus Neg Hx     Stroke Neg Hx     Thyroid disease Neg Hx        Social History     Socioeconomic History    Marital status:      Spouse name: Not on file    Number of children: Not on file    Years of education: Not on file    Highest education level: Not on file   Social Needs    Financial resource strain: Not on file    Food insecurity - worry: Not on file    Food insecurity - inability: Not on file    Transportation needs - medical: Not on file    Transportation needs - non-medical: Not on file   Occupational History    Not on file   Tobacco Use    Smoking status: Never Smoker    Smokeless tobacco: Never Used   Substance and Sexual Activity    Alcohol use:  "No     Alcohol/week: 0.0 oz     Frequency: Never     Binge frequency: Never    Drug use: No    Sexual activity: Yes     Partners: Male     Birth control/protection: Post-menopausal   Other Topics Concern    Are you pregnant or think you may be? Not Asked    Breast-feeding Not Asked   Social History Narrative    Not on file               Review of Systems   Constitution: Negative for chills, decreased appetite and fever.   Cardiovascular: Negative for leg swelling.   Musculoskeletal: Positive for joint pain (r foot, l knee) and myalgias. Negative for arthritis and joint swelling.   Gastrointestinal: Negative for nausea and vomiting.   Neurological: Negative for loss of balance, numbness and paresthesias.           Objective:       Vitals:    11/20/18 1400   BP: 130/79   Pulse: 64   Resp: 18   Weight: 77.6 kg (171 lb)   Height: 5' 6.5" (1.689 m)   PainSc:   8   PainLoc: Foot        Physical Exam   Constitutional: She is oriented to person, place, and time. She appears well-developed and well-nourished.   Cardiovascular:   Pulses:       Dorsalis pedis pulses are 2+ on the right side, and 2+ on the left side.        Posterior tibial pulses are 2+ on the right side, and 2+ on the left side.   Musculoskeletal: She exhibits no edema or tenderness.        Right ankle: Normal.        Left ankle: Normal.        Right foot: There is no swelling, no crepitus and no deformity.        Left foot: There is no swelling, no crepitus and no deformity.   Adequate joint range of motion without pain, limitation, nor crepitation Bilateral feet and ankle joints. Muscle strength is 5/5 in all groups bilaterally.      Flexible pes planus foot type w/ medial arch collapse and mild gastroc equinus     TTP midtarsal jt R w/ mild associated swelling. No cystic lesions noted. No erythema observed   Lymphadenopathy:   No palpable lymph nodes   Neurological: She is alert and oriented to person, place, and time. She has normal strength.   Skin: " Skin is warm, dry and intact. No rash noted. No erythema. Nails show no clubbing.   Psychiatric: She has a normal mood and affect. Her behavior is normal.             Assessment:       Encounter Diagnoses   Name Primary?    Foot pain, right Yes    Flat foot, acquired, right     Arthritis of midtarsal joint          Plan:       Huber was seen today for follow-up and foot problem.    Diagnoses and all orders for this visit:    Foot pain, right  -     X-Ray Foot Complete Right; Future    Flat foot, acquired, right    Arthritis of midtarsal joint      I counseled the patient on her conditions, their implications and medical management.      Resume icing   OTC NSAIDs prn pain as per package instructions   Patient fitted with Darco shoe and instructed on proper use. This should be worn for at least 2 weeks

## 2018-11-30 ENCOUNTER — TELEPHONE (OUTPATIENT)
Dept: PODIATRY | Facility: CLINIC | Age: 68
End: 2018-11-30

## 2018-11-30 NOTE — TELEPHONE ENCOUNTER
called patient to let her know paperwork has been submited to disability and discuss x-ray result, but no answer

## 2018-11-30 NOTE — TELEPHONE ENCOUNTER
----- Message from Celso Posada sent at 11/29/2018 11:27 AM CST -----  Contact: patient  Please call pt at [phone]     Patient would like to get her xray results    Also she needs to know if you received any forms faxed from [employer]?    Thank you

## 2018-12-04 ENCOUNTER — HOSPITAL ENCOUNTER (OUTPATIENT)
Dept: RADIOLOGY | Facility: HOSPITAL | Age: 68
Discharge: HOME | End: 2018-12-04
Attending: FAMILY MEDICINE
Payer: MEDICARE

## 2018-12-04 ENCOUNTER — TELEPHONE (OUTPATIENT)
Dept: PODIATRY | Facility: CLINIC | Age: 68
End: 2018-12-04

## 2018-12-04 DIAGNOSIS — Z12.31 ENCOUNTER FOR SCREENING MAMMOGRAM FOR BREAST CANCER: ICD-10-CM

## 2018-12-04 PROCEDURE — 77067 SCR MAMMO BI INCL CAD: CPT | Mod: TC

## 2018-12-04 PROCEDURE — 77063 BREAST TOMOSYNTHESIS BI: CPT | Mod: 26,,, | Performed by: RADIOLOGY

## 2018-12-04 PROCEDURE — 77063 BREAST TOMOSYNTHESIS BI: CPT | Mod: TC

## 2018-12-04 PROCEDURE — 77067 SCR MAMMO BI INCL CAD: CPT | Mod: 26,,, | Performed by: RADIOLOGY

## 2018-12-04 NOTE — TELEPHONE ENCOUNTER
----- Message from Sarah Cope sent at 12/4/2018 12:58 PM CST -----  Contact: self   Patient states need to speak with nurse.   Patient calling to verify if doctor received paperwork from [employer]. Patient states [email or web address] is waiting on form to be fax back to them.   Patient states need results of Xray.   Please call pt at [phone]

## 2018-12-04 NOTE — TELEPHONE ENCOUNTER
Spoke with patient.. explain to her about her situation..papers will be faxed to the necessary parties..

## 2018-12-07 ENCOUNTER — TELEPHONE (OUTPATIENT)
Dept: PODIATRY | Facility: CLINIC | Age: 68
End: 2018-12-07

## 2018-12-07 NOTE — TELEPHONE ENCOUNTER
Spoke to pat and she want me to talk to Dr. Mathis on Monday and fax her clinic notes out to her insurance on Monday

## 2018-12-07 NOTE — TELEPHONE ENCOUNTER
----- Message from Gavin Ansari sent at 12/7/2018  1:48 PM CST -----  Contact: Self/ [phone]  Patient would like a call back to speak with a nurse about her disability insurance. Patient insurance company need the patient last office visit and the patiet would like the office to fax her information to  Fax number  [phone]

## 2018-12-10 ENCOUNTER — TELEPHONE (OUTPATIENT)
Dept: PODIATRY | Facility: CLINIC | Age: 68
End: 2018-12-10

## 2018-12-11 ENCOUNTER — OFFICE VISIT (OUTPATIENT)
Dept: FAMILY MEDICINE | Facility: CLINIC | Age: 68
End: 2018-12-11
Payer: MEDICARE

## 2018-12-11 VITALS
OXYGEN SATURATION: 98 % | HEART RATE: 64 BPM | SYSTOLIC BLOOD PRESSURE: 117 MMHG | WEIGHT: 179.25 LBS | DIASTOLIC BLOOD PRESSURE: 70 MMHG | TEMPERATURE: 98 F | HEIGHT: 67 IN | BODY MASS INDEX: 28.13 KG/M2

## 2018-12-11 DIAGNOSIS — S86.912A STRAIN OF LEFT KNEE AND LEG, INITIAL ENCOUNTER: ICD-10-CM

## 2018-12-11 DIAGNOSIS — M17.12 OSTEOARTHRITIS OF LEFT KNEE, UNSPECIFIED OSTEOARTHRITIS TYPE: ICD-10-CM

## 2018-12-11 DIAGNOSIS — M85.80 OSTEOPENIA, UNSPECIFIED LOCATION: ICD-10-CM

## 2018-12-11 DIAGNOSIS — M89.9 BONE DISORDER: ICD-10-CM

## 2018-12-11 DIAGNOSIS — Z00.00 ROUTINE GENERAL MEDICAL EXAMINATION AT A HEALTH CARE FACILITY: Primary | ICD-10-CM

## 2018-12-11 PROCEDURE — 99397 PER PM REEVAL EST PAT 65+ YR: CPT | Mod: 25,S$PBB,, | Performed by: FAMILY MEDICINE

## 2018-12-11 PROCEDURE — 99999 PR PBB SHADOW E&M-EST. PATIENT-LVL IV: CPT | Mod: PBBFAC,,, | Performed by: FAMILY MEDICINE

## 2018-12-11 PROCEDURE — 99214 OFFICE O/P EST MOD 30 MIN: CPT | Mod: PBBFAC,PO,25 | Performed by: FAMILY MEDICINE

## 2018-12-11 PROCEDURE — 90670 PCV13 VACCINE IM: CPT | Mod: PBBFAC,PO

## 2018-12-11 RX ORDER — MELOXICAM 7.5 MG/1
7.5 TABLET ORAL DAILY PRN
Qty: 30 TABLET | Refills: 2 | Status: SHIPPED | OUTPATIENT
Start: 2018-12-11 | End: 2019-12-13 | Stop reason: SDUPTHER

## 2018-12-11 RX ORDER — ALPRAZOLAM 0.5 MG/1
0.5 TABLET ORAL
Qty: 30 TABLET | Refills: 2 | Status: SHIPPED | OUTPATIENT
Start: 2018-12-11 | End: 2019-12-13 | Stop reason: SDUPTHER

## 2018-12-11 NOTE — PROGRESS NOTES
(Portions of this note were dictated using voice recognition software and may contain dictation related errors in spelling/grammar/syntax not found on text review)    CC:  Annual    HPI: 68 y.o. female last visit December 2017.  Here for annual exam    Anxiety, on occasional alprazolam, needs refill    Left knee osteoarthritis, status post TKA in August 2018.  Started having left foot pain, followed with Podiatry in November.  Advise an OTC NSAIDs and fitted with orthotics.  X-ray showed some degenerative change and hallux valgus.  Still having a lot of difficulty with standing for long periods of time.  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    Osteopenia, DEXA is overdue, was normal in 2015        Past Medical History:   Diagnosis Date    Anxiety     Chronic constipation     DDD (degenerative disc disease), lumbar     Fibroid tumor     Hyperplastic colon polyp     Osteopenia     Pes planus        Past Surgical History:   Procedure Laterality Date    ARTHROPLASTY, KNEE, SIGHT ASSISTED Left 8/27/2018    Performed by [facility]ax [facility] at [facility] OR    BREAST BIOPSY      left breast    BREAST RECONSTRUCTION      Breast reduction  2004    COLONOSCOPY N/A 10/10/2018    Procedure: COLONOSCOPY;  Surgeon: Cristal Whiteside MD;  Location: [facility] ENDO;  Service: Endoscopy;  Laterality: N/A;    COLONOSCOPY N/A 10/10/2018    Performed by Cristal Wh[facility]ide MD at [facility] ENDO    COLONOSCOPY N/A 7/31/2013    Performed by Cristal Wh[facility]ide MD at [facility] ENDO    CRYOTHERAPY, NERVE, PERIPHERAL, PERCUTANEOUS, USING LIQUID NITROUS OXIDE IN CLOSED NEEDLE DEVICE Left 8/23/2018    Performed by FRANK Null at [facility] OR    PERCUTANEOUS CRYOTHERAPY OF PERIPHERAL NERVE USING LIQUID NITROUS OXIDE IN CLOSED NEEDLE DEVICE Left 8/23/2018    Procedure: CRYOTHERAPY, NERVE, PERIPHERAL, PERCUTANEOUS, USING LIQUID NITROUS OXIDE IN CLOSED NEEDLE DEVICE;  Surgeon: Tan PEREYRA  FRANK Fofana;  Location: [facility] OR;  Service: Orthopedics;  Laterality: Left;    TOTAL KNEE ARTHROPLASTY Left     TUBAL LIGATION         Family History   Problem Relation Age of Onset    Breast cancer Maternal Aunt     Colon cancer Cousin     Diabetes Sister         x2    Diabetes Mother     No Known Problems Father     No Known Problems Brother     No Known Problems Maternal Uncle     No Known Problems Paternal Aunt     No Known Problems Paternal Uncle     No Known Problems Maternal Grandmother     No Known Problems Maternal Grandfather     No Known Problems Paternal Grandmother     No Known Problems Paternal Grandfather     Amblyopia Neg Hx     Blindness Neg Hx     Cancer Neg Hx     Cataracts Neg Hx     Glaucoma Neg Hx     Hypertension Neg Hx     Macular degeneration Neg Hx     Retinal detachment Neg Hx     Strabismus Neg Hx     Stroke Neg Hx     Thyroid disease Neg Hx        Social History     Socioeconomic History    Marital status:      Spouse name: Not on file    Number of children: Not on file    Years of education: Not on file    Highest education level: Not on file   Social Needs    Financial resource strain: Not on file    Food insecurity - worry: Not on file    Food insecurity - inability: Not on file    Transportation needs - medical: Not on file    Transportation needs - non-medical: Not on file   Occupational History    Not on file   Tobacco Use    Smoking status: Never Smoker    Smokeless tobacco: Never Used   Substance and Sexual Activity    Alcohol use: No     Alcohol/week: 0.0 oz     Frequency: Never     Binge frequency: Never    Drug use: No    Sexual activity: Yes     Partners: Male     Birth control/protection: Post-menopausal   Other Topics Concern    Are you pregnant or think you may be? Not Asked    Breast-feeding Not Asked   Social History Narrative    Not on file     Lab Results   Component Value Date    WBC 4.85 12/04/2018    HGB 13.7  12/04/2018    HCT 42.4 12/04/2018     12/04/2018    CHOL 206 (H) 12/04/2018    TRIG 88 12/04/2018    HDL 70 12/04/2018    ALT 12 12/04/2018    AST 17 12/04/2018     12/04/2018    K 4.1 12/04/2018     12/04/2018    CREATININE 0.7 12/04/2018    CALCIUM 9.6 12/04/2018    ALBUMIN 3.6 12/04/2018    BUN 13 12/04/2018    CO2 28 12/04/2018    TSH 1.155 12/04/2018    INR 1.0 07/30/2018    HGBA1C 5.4 07/30/2018    LDLCALC 118.4 12/04/2018    GLU 89 12/04/2018                 Vital signs reviewed  PE:   APPEARANCE: Well nourished, well developed, in no acute distress.    HEAD: Normocephalic, atraumatic.  EYES: PERRL. EOMI.   Conjunctivae noninjected.  EARS: TM's intact. Light reflex normal. No retraction or perforation.    NOSE: Mucosa pink. Airway clear.  MOUTH & THROAT: No tonsillar enlargement. No pharyngeal erythema or exudate.   NECK: Supple with no cervical lymphadenopathy.  No carotid bruits.  No thyromegaly  CHEST: Good inspiratory effort. Lungs clear to auscultation with no wheezes or crackles.  CARDIOVASCULAR: Normal S1, S2. No rubs, murmurs, or gallops.  ABDOMEN: Bowel sounds normal. Not distended. Soft. No tenderness or masses. No organomegaly.  EXTREMITIES: No edema, cyanosis, or clubbing.      IMPRESSION  1. Routine general medical examination at a health care facility    2. Osteopenia, unspecified location    3. Bone disorder    4. Osteoarthritis of left knee, unspecified osteoarthritis type        PLAN  Orders Placed This Encounter   Procedures    DXA Bone Density Spine And Hip     Reviewed labs as above    DEXA updated    Can continue follow-up with Orthopedics, Physical therapy, Podiatry as directed for her knee and foot pains.  They may be able to guide her as far as time to return to work and other restrictions that she may need depending on her treatment plans      SCREENINGS     Immunizations:  tdap 2015  Flu:  utd   Pneumococcal vaccination:  Had pneumococcal vaccine but not sure  which 1 it is, have been unable to get records.  Will provide Prevnar today, Pneumovax next year  Zoster 10/3/13     Age/Gender Appropriate screenings:  mmg 11/27/17  dexa 10/19/15 nml, repeat needed  Colonoscopy 7/31/13 1 polyp rt 5 yrs.  Pap smear 2016, Dr. Sparks

## 2018-12-11 NOTE — PATIENT INSTRUCTIONS
"Over the counter pain therapies (creams/patch):    1. Lidocaine patch    2. aspercreme    3. "2 old goats"    4. "blue emu"    5. salonpas     7. biofreeze    8. Capzacin  "

## 2018-12-21 ENCOUNTER — TELEPHONE (OUTPATIENT)
Dept: OBSTETRICS AND GYNECOLOGY | Facility: CLINIC | Age: 68
End: 2018-12-21

## 2018-12-21 NOTE — TELEPHONE ENCOUNTER
"----- Message from Patricia Combs sent at 12/21/2018  9:35 AM CST -----  Contact: [phone]/self  Pt would like a callback concerning an "issue that needs to be resolved". Please call and advise.   "

## 2018-12-28 ENCOUNTER — HOSPITAL ENCOUNTER (OUTPATIENT)
Dept: RADIOLOGY | Facility: HOSPITAL | Age: 68
Discharge: HOME | End: 2018-12-28
Attending: FAMILY MEDICINE
Payer: MEDICARE

## 2018-12-28 DIAGNOSIS — M89.9 BONE DISORDER: ICD-10-CM

## 2018-12-28 DIAGNOSIS — M85.80 OSTEOPENIA, UNSPECIFIED LOCATION: ICD-10-CM

## 2018-12-28 PROCEDURE — 77080 DXA BONE DENSITY AXIAL: CPT | Mod: TC

## 2018-12-28 PROCEDURE — 77080 DXA BONE DENSITY AXIAL: CPT | Mod: 26,,, | Performed by: RADIOLOGY

## 2018-12-30 ENCOUNTER — PATIENT MESSAGE (OUTPATIENT)
Dept: FAMILY MEDICINE | Facility: CLINIC | Age: 68
End: 2018-12-30

## 2019-01-07 ENCOUNTER — TELEPHONE (OUTPATIENT)
Dept: PODIATRY | Facility: CLINIC | Age: 69
End: 2019-01-07

## 2019-01-07 NOTE — LETTER
January 7, 2019                   [facility]  Podiatry  [address]  [facility] [address]  Phone: [phone]   January 7, 2019     Patient: Huber Pena   YOB: 1950   Date of Visit: 1/7/2019       To Whom it May Concern:    Huber Pena was seen in my clinic on 11/20/18. She may return to work with the dispensed Darco shoe .     If you have any questions or concerns, please don't hesitate to call.    Sincerely,           Chiqui Alvarez DPM

## 2019-01-07 NOTE — TELEPHONE ENCOUNTER
----- Message from Jessica Zuniga sent at 1/7/2019  8:27 AM CST -----  Contact: pt  Pt would like to be called back regarding foot problem trying to return back to work    Pt can be reached at [phone]

## 2019-04-01 ENCOUNTER — OFFICE VISIT (OUTPATIENT)
Dept: DERMATOLOGY | Facility: CLINIC | Age: 69
End: 2019-04-01
Payer: MEDICARE

## 2019-04-01 DIAGNOSIS — L81.9 DYSCHROMIA: Primary | ICD-10-CM

## 2019-04-01 DIAGNOSIS — L72.0 EPIDERMAL CYST: ICD-10-CM

## 2019-04-01 PROCEDURE — 99202 OFFICE O/P NEW SF 15 MIN: CPT | Mod: S$PBB,,, | Performed by: DERMATOLOGY

## 2019-04-01 PROCEDURE — 99202 PR OFFICE/OUTPT VISIT, NEW, LEVL II, 15-29 MIN: ICD-10-PCS | Mod: S$PBB,,, | Performed by: DERMATOLOGY

## 2019-04-01 PROCEDURE — 99999 PR PBB SHADOW E&M-EST. PATIENT-LVL II: ICD-10-PCS | Mod: PBBFAC,,, | Performed by: DERMATOLOGY

## 2019-04-01 PROCEDURE — 99212 OFFICE O/P EST SF 10 MIN: CPT | Mod: PBBFAC,PO | Performed by: DERMATOLOGY

## 2019-04-01 PROCEDURE — 99999 PR PBB SHADOW E&M-EST. PATIENT-LVL II: CPT | Mod: PBBFAC,,, | Performed by: DERMATOLOGY

## 2019-04-01 NOTE — PROGRESS NOTES
"  Subjective:       Patient ID:  Huber Pena is a 68 y.o. female who presents for   Chief Complaint   Patient presents with    Lesion     Pt presents for lesion to post neck.  X 6 weeks.  Denies pain or bleeding.  tx w "trying to pull the hair out" and OTC creams.  Smaller now.  Lesion was very sore and raised.    C/o hyperpigmentation on her face and neck  That started over 5 years ago. Used tazorac given by dr abbott and this caused too much peeling.  Pt does use sunscreen daily . Does not recall any medication or rash when this occurred just developed darkness.     Lesion         Review of Systems   Constitutional: Negative for fever, chills and fatigue.   Skin: Positive for daily sunscreen use and tendency to form keloidal scars. Negative for itching and rash.        Objective:    Physical Exam   Constitutional: She appears well-developed and well-nourished. No distress.   Neurological: She is alert and oriented to person, place, and time. She is not disoriented.   Psychiatric: She has a normal mood and affect.   Skin:   Areas Examined (abnormalities noted in diagram):   Head / Face Inspection Performed  Neck Inspection Performed  Chest / Axilla Inspection Performed                       Diagram Legend     Erythematous scaling macule/papule c/w actinic keratosis       Vascular papule c/w angioma      Pigmented verrucoid papule/plaque c/w seborrheic keratosis      Yellow umbilicated papule c/w sebaceous hyperplasia      Irregularly shaped tan macule c/w lentigo     1-2 mm smooth white papules consistent with Milia      Movable subcutaneous cyst with punctum c/w epidermal inclusion cyst      Subcutaneous movable cyst c/w pilar cyst      Firm pink to brown papule c/w dermatofibroma      Pedunculated fleshy papule(s) c/w skin tag(s)      Evenly pigmented macule c/w junctional nevus     Mildly variegated pigmented, slightly irregular-bordered macule c/w mildly atypical nevus      Flesh colored to evenly pigmented " papule c/w intradermal nevus       Pink pearly papule/plaque c/w basal cell carcinoma      Erythematous hyperkeratotic cursted plaque c/w SCC      Surgical scar with no sign of skin cancer recurrence      Open and closed comedones      Inflammatory papules and pustules      Verrucoid papule consistent consistent with wart     Erythematous eczematous patches and plaques     Dystrophic onycholytic nail with subungual debris c/w onychomycosis     Umbilicated papule    Erythematous-base heme-crusted tan verrucoid plaque consistent with inflamed seborrheic keratosis     Erythematous Silvery Scaling Plaque c/w Psoriasis     See annotation      Assessment / Plan:        Dyschromia  Sunscreen daily   Can try OTC retinol    Epidermal cyst  Reassurance given to patient. No treatment is necessary.   Treatment of benign, asymptomatic lesions may be considered cosmetic.  Pt also reports tendency of keloid scarring            Follow up if symptoms worsen or fail to improve.

## 2019-07-08 ENCOUNTER — TELEPHONE (OUTPATIENT)
Dept: FAMILY MEDICINE | Facility: CLINIC | Age: 69
End: 2019-07-08

## 2019-07-08 DIAGNOSIS — B35.6 TINEA INGUINALIS: ICD-10-CM

## 2019-07-08 RX ORDER — NYSTATIN 100000 U/G
CREAM TOPICAL 2 TIMES DAILY
Qty: 30 G | Refills: 0 | Status: SHIPPED | OUTPATIENT
Start: 2019-07-08 | End: 2019-07-08 | Stop reason: SDUPTHER

## 2019-07-08 RX ORDER — NYSTATIN 100000 U/G
CREAM TOPICAL 2 TIMES DAILY
Qty: 30 G | Refills: 0 | Status: SHIPPED | OUTPATIENT
Start: 2019-07-08 | End: 2019-12-05 | Stop reason: ALTCHOICE

## 2019-07-08 NOTE — TELEPHONE ENCOUNTER
Patient is experiencing vaginal itch.  States she has used nystatin cream in the past and is requesting a refill. Please advise.

## 2019-07-08 NOTE — TELEPHONE ENCOUNTER
----- Message from Celine Osorio sent at 7/8/2019 10:12 AM CDT -----  No. [phone]   Patient has an itchy rash in the vaginal area.   Nystatin Cream helped before.   Please call in again.   [facility]

## 2019-07-09 ENCOUNTER — OFFICE VISIT (OUTPATIENT)
Dept: OTOLARYNGOLOGY | Facility: CLINIC | Age: 69
End: 2019-07-09
Payer: MEDICARE

## 2019-07-09 VITALS — HEART RATE: 70 BPM | SYSTOLIC BLOOD PRESSURE: 136 MMHG | DIASTOLIC BLOOD PRESSURE: 80 MMHG

## 2019-07-09 DIAGNOSIS — H61.23 BILATERAL IMPACTED CERUMEN: Primary | ICD-10-CM

## 2019-07-09 PROCEDURE — 99212 OFFICE O/P EST SF 10 MIN: CPT | Mod: PBBFAC | Performed by: NURSE PRACTITIONER

## 2019-07-09 PROCEDURE — 69210 REMOVE IMPACTED EAR WAX UNI: CPT | Mod: S$PBB,,, | Performed by: NURSE PRACTITIONER

## 2019-07-09 PROCEDURE — 99999 PR PBB SHADOW E&M-EST. PATIENT-LVL II: ICD-10-PCS | Mod: PBBFAC,,, | Performed by: NURSE PRACTITIONER

## 2019-07-09 PROCEDURE — 99499 UNLISTED E&M SERVICE: CPT | Mod: S$PBB,,, | Performed by: NURSE PRACTITIONER

## 2019-07-09 PROCEDURE — 99999 PR PBB SHADOW E&M-EST. PATIENT-LVL II: CPT | Mod: PBBFAC,,, | Performed by: NURSE PRACTITIONER

## 2019-07-09 PROCEDURE — 69210 REMOVE IMPACTED EAR WAX UNI: CPT | Mod: 50,PBBFAC | Performed by: NURSE PRACTITIONER

## 2019-07-09 PROCEDURE — 69210 EAR CERUMEN REMOVAL: ICD-10-PCS | Mod: S$PBB,,, | Performed by: NURSE PRACTITIONER

## 2019-07-09 PROCEDURE — 99499 NO LOS: ICD-10-PCS | Mod: S$PBB,,, | Performed by: NURSE PRACTITIONER

## 2019-07-09 NOTE — PROCEDURES
Ear Cerumen Removal  Date/Time: 7/9/2019 2:23 PM  Performed by: aErnest Crockett NP  Authorized by: Earnest Crockett NP     Location details:  Both ears  Procedure type: curette    Cerumen  Removal Results:  Cerumen completely removed  Patient tolerance:  Patient tolerated the procedure well with no immediate complications     Procedure Note:    The patient was brought to the minor procedure room and placed under the operating microscope of the left ear canal which was cleaned of ceruminous debris. Using a combination of suction, curettes and cup forceps the patient's cerumen impaction was removed. The tympanic membrane was evaluated and was unremarkable. The patient tolerated the procedure well. There were no complications.  Procedure Note:    Patient was brought to the minor procedure room and using the operating microscope of the right ear canal which was cleaned of ceruminous debris. There was a significant cerumen impaction.  Using a combination of suction, curettes and cup forceps the patient's cerumen impaction was removed. Tympanic membrane intact. Pt tolerated well. There were no complications.

## 2019-09-30 ENCOUNTER — TELEPHONE (OUTPATIENT)
Dept: OPHTHALMOLOGY | Facility: CLINIC | Age: 69
End: 2019-09-30

## 2019-09-30 ENCOUNTER — TELEPHONE (OUTPATIENT)
Dept: FAMILY MEDICINE | Facility: CLINIC | Age: 69
End: 2019-09-30

## 2019-09-30 ENCOUNTER — TELEPHONE (OUTPATIENT)
Dept: OBSTETRICS AND GYNECOLOGY | Facility: CLINIC | Age: 69
End: 2019-09-30

## 2019-09-30 DIAGNOSIS — Z00.00 ROUTINE GENERAL MEDICAL EXAMINATION AT A HEALTH CARE FACILITY: ICD-10-CM

## 2019-09-30 DIAGNOSIS — K59.09 CHRONIC CONSTIPATION: ICD-10-CM

## 2019-09-30 DIAGNOSIS — Z13.6 ENCOUNTER FOR SCREENING FOR CARDIOVASCULAR DISORDERS: ICD-10-CM

## 2019-09-30 DIAGNOSIS — M89.9 BONE DISORDER: ICD-10-CM

## 2019-09-30 DIAGNOSIS — M85.80 OSTEOPENIA, UNSPECIFIED LOCATION: Primary | ICD-10-CM

## 2019-09-30 NOTE — TELEPHONE ENCOUNTER
----- Message from Heather Landry sent at 9/30/2019  3:55 PM CDT -----  Contact: Patient  Type: Needs Medical Advice    Who Called:  Patient  Symptoms (please be specific):  na  How long has patient had these symptoms:  luz  Pharmacy name and phone #:  [facility]  Best Call Back Number: [phone] (home)    Additional Information: Patient is requesting to have orders for her annual mammogram, please call to advise, thank you!

## 2019-09-30 NOTE — TELEPHONE ENCOUNTER
----- Message from Heather Landry sent at 9/30/2019  3:56 PM CDT -----  Contact: Patient  Type: Needs Medical Advice    Who Called: Patient  Symptoms (please be specific):  na  How long has patient had these symptoms:  luz  Pharmacy name and phone #:  [facility]  Best Call Back Number: [phone] (home)    Additional Information: Patient is requesting lab orders, please call to advise, thank you!

## 2019-09-30 NOTE — TELEPHONE ENCOUNTER
Orders Placed This Encounter   Procedures    CBC auto differential    Comprehensive metabolic panel    Lipid panel    TSH    Vitamin D     Can get labs 1 wk prior to visit.

## 2019-09-30 NOTE — TELEPHONE ENCOUNTER
----- Message from Heather Landry sent at 9/30/2019  3:59 PM CDT -----  Contact: Patient  Type:  Sooner Apoointment Request    Caller is requesting a sooner appointment.  Caller declined first available appointment listed below.  Caller will not accept being placed on the waitlist and is requesting a message be sent to doctor.    Name of Caller:  Patient  When is the first available appointment?  na  Symptoms:  Glaucoma-est care  Best Call Back Number:  [phone]    Additional Information:  Unable to schedule due to epic

## 2019-09-30 NOTE — TELEPHONE ENCOUNTER
Pt needs an appt with  to establish glaucoma care b/c her previous doctor no longer accepts her insurance. 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 Pt advised that since this would be her first appt that we could not schedule appt that late. Pt offered an appt when school is out but pt did not want to wait until November. Pt decided to look for another clinic and will call back if she wants to schedule.

## 2019-10-01 ENCOUNTER — TELEPHONE (OUTPATIENT)
Dept: FAMILY MEDICINE | Facility: CLINIC | Age: 69
End: 2019-10-01

## 2019-10-10 ENCOUNTER — IMMUNIZATION (OUTPATIENT)
Dept: URGENT CARE | Facility: CLINIC | Age: 69
End: 2019-10-10
Payer: MEDICARE

## 2019-10-10 DIAGNOSIS — Z23 IMMUNIZATION DUE: Primary | ICD-10-CM

## 2019-10-10 PROCEDURE — 90662 FLU VACCINE - HIGH DOSE (65+) PRESERVATIVE FREE IM: ICD-10-PCS | Mod: S$GLB,,, | Performed by: PHYSICIAN ASSISTANT

## 2019-10-10 PROCEDURE — G0008 ADMIN INFLUENZA VIRUS VAC: HCPCS | Mod: S$GLB,,, | Performed by: PHYSICIAN ASSISTANT

## 2019-10-10 PROCEDURE — G0008 FLU VACCINE - HIGH DOSE (65+) PRESERVATIVE FREE IM: ICD-10-PCS | Mod: S$GLB,,, | Performed by: PHYSICIAN ASSISTANT

## 2019-10-10 PROCEDURE — 90662 IIV NO PRSV INCREASED AG IM: CPT | Mod: S$GLB,,, | Performed by: PHYSICIAN ASSISTANT

## 2019-10-17 NOTE — TELEPHONE ENCOUNTER
Attempted to contact patient about scheduling a Colonoscopy. Patient voicemail box is full. Unable to leave message.  
No

## 2019-10-25 ENCOUNTER — OFFICE VISIT (OUTPATIENT)
Dept: OTOLARYNGOLOGY | Facility: CLINIC | Age: 69
End: 2019-10-25
Payer: MEDICARE

## 2019-10-25 VITALS
SYSTOLIC BLOOD PRESSURE: 136 MMHG | DIASTOLIC BLOOD PRESSURE: 80 MMHG | HEIGHT: 66 IN | HEART RATE: 69 BPM | BODY MASS INDEX: 28.93 KG/M2

## 2019-10-25 DIAGNOSIS — H61.23 BILATERAL IMPACTED CERUMEN: Primary | ICD-10-CM

## 2019-10-25 PROCEDURE — 99499 NO LOS: ICD-10-PCS | Mod: S$PBB,,, | Performed by: NURSE PRACTITIONER

## 2019-10-25 PROCEDURE — 99499 UNLISTED E&M SERVICE: CPT | Mod: S$PBB,,, | Performed by: NURSE PRACTITIONER

## 2019-10-25 PROCEDURE — 69210 EAR CERUMEN REMOVAL: ICD-10-PCS | Mod: S$PBB,,, | Performed by: NURSE PRACTITIONER

## 2019-10-25 PROCEDURE — 99999 PR PBB SHADOW E&M-EST. PATIENT-LVL III: ICD-10-PCS | Mod: PBBFAC,,, | Performed by: NURSE PRACTITIONER

## 2019-10-25 PROCEDURE — 99213 OFFICE O/P EST LOW 20 MIN: CPT | Mod: PBBFAC,25 | Performed by: NURSE PRACTITIONER

## 2019-10-25 PROCEDURE — 99999 PR PBB SHADOW E&M-EST. PATIENT-LVL III: CPT | Mod: PBBFAC,,, | Performed by: NURSE PRACTITIONER

## 2019-10-25 PROCEDURE — 69210 REMOVE IMPACTED EAR WAX UNI: CPT | Mod: S$PBB,,, | Performed by: NURSE PRACTITIONER

## 2019-10-25 PROCEDURE — 69210 REMOVE IMPACTED EAR WAX UNI: CPT | Mod: 50,PBBFAC | Performed by: NURSE PRACTITIONER

## 2019-10-25 NOTE — PROCEDURES
Ear Cerumen Removal  Date/Time: 10/25/2019 1:30 PM  Performed by: Earnest Crockett NP  Authorized by: Earnest Crockett NP     Location details:  Both ears  Procedure type: curette    Cerumen  Removal Results:  Cerumen completely removed  Patient tolerance:  Patient tolerated the procedure well with no immediate complications     Procedure Note:    The patient was brought to the minor procedure room and placed under the operating microscope of the left ear canal which was cleaned of ceruminous debris. Using a combination of suction, curettes and cup forceps the patient's cerumen impaction was removed. The tympanic membrane was evaluated and was unremarkable. The patient tolerated the procedure well. There were no complications.  Procedure Note:    Patient was brought to the minor procedure room and using the operating microscope of the right ear canal which was cleaned of ceruminous debris. There was a significant cerumen impaction.  Using a combination of suction, curettes and cup forceps the patient's cerumen impaction was removed. Tympanic membrane intact. Pt tolerated well. There were no complications.

## 2019-11-15 ENCOUNTER — TELEPHONE (OUTPATIENT)
Dept: FAMILY MEDICINE | Facility: CLINIC | Age: 69
End: 2019-11-15

## 2019-11-15 DIAGNOSIS — Z12.31 SCREENING MAMMOGRAM, ENCOUNTER FOR: Primary | ICD-10-CM

## 2019-11-15 NOTE — TELEPHONE ENCOUNTER
----- Message from Conchis Garcia MA sent at 11/15/2019  9:39 AM CST -----  Contact: H [phone] or JOLENE [phone]  Pt is requesorders for a mammo and dxa. Is it time?  ----- Message -----  From: Arlene Wang  Sent: 11/15/2019   9:28 AM CST  To: Mat[facility] Galeano Staff    Patient is requesting orders for mammogram and bone density test. Please advise.

## 2019-11-15 NOTE — TELEPHONE ENCOUNTER
DEXA done on 11/20/2018, due again in 2020 or 2021    Mammogram was done 12/04/2018:  Can order to be done after this date in 2019.

## 2019-11-18 ENCOUNTER — TELEPHONE (OUTPATIENT)
Dept: FAMILY MEDICINE | Facility: CLINIC | Age: 69
End: 2019-11-18

## 2019-11-20 ENCOUNTER — TELEPHONE (OUTPATIENT)
Dept: FAMILY MEDICINE | Facility: CLINIC | Age: 69
End: 2019-11-20

## 2019-11-21 ENCOUNTER — TELEPHONE (OUTPATIENT)
Dept: FAMILY MEDICINE | Facility: CLINIC | Age: 69
End: 2019-11-21

## 2019-12-05 ENCOUNTER — TELEPHONE (OUTPATIENT)
Dept: FAMILY MEDICINE | Facility: CLINIC | Age: 69
End: 2019-12-05

## 2019-12-05 ENCOUNTER — OFFICE VISIT (OUTPATIENT)
Dept: OBSTETRICS AND GYNECOLOGY | Facility: CLINIC | Age: 69
End: 2019-12-05
Payer: MEDICARE

## 2019-12-05 ENCOUNTER — HOSPITAL ENCOUNTER (OUTPATIENT)
Dept: RADIOLOGY | Facility: HOSPITAL | Age: 69
Discharge: HOME | End: 2019-12-05
Attending: FAMILY MEDICINE
Payer: MEDICARE

## 2019-12-05 VITALS — HEIGHT: 66 IN | BODY MASS INDEX: 28.93 KG/M2

## 2019-12-05 DIAGNOSIS — Z12.4 SCREENING FOR MALIGNANT NEOPLASM OF THE CERVIX: Primary | ICD-10-CM

## 2019-12-05 DIAGNOSIS — Z12.31 SCREENING MAMMOGRAM, ENCOUNTER FOR: ICD-10-CM

## 2019-12-05 PROCEDURE — 99999 PR PBB SHADOW E&M-EST. PATIENT-LVL II: CPT | Mod: PBBFAC,,, | Performed by: OBSTETRICS & GYNECOLOGY

## 2019-12-05 PROCEDURE — 99999 PR PBB SHADOW E&M-EST. PATIENT-LVL II: ICD-10-PCS | Mod: PBBFAC,,, | Performed by: OBSTETRICS & GYNECOLOGY

## 2019-12-05 PROCEDURE — 77067 SCR MAMMO BI INCL CAD: CPT | Mod: TC

## 2019-12-05 PROCEDURE — 77067 SCR MAMMO BI INCL CAD: CPT | Mod: 26,,, | Performed by: RADIOLOGY

## 2019-12-05 PROCEDURE — 1126F PR PAIN SEVERITY QUANTIFIED, NO PAIN PRESENT: ICD-10-PCS | Mod: ,,, | Performed by: OBSTETRICS & GYNECOLOGY

## 2019-12-05 PROCEDURE — G0101 CA SCREEN;PELVIC/BREAST EXAM: HCPCS | Mod: S$PBB,,, | Performed by: OBSTETRICS & GYNECOLOGY

## 2019-12-05 PROCEDURE — 77063 MAMMO DIGITAL SCREENING BILAT WITH TOMOSYNTHESIS_CAD: ICD-10-PCS | Mod: 26,,, | Performed by: RADIOLOGY

## 2019-12-05 PROCEDURE — G0101 PR CA SCREEN;PELVIC/BREAST EXAM: ICD-10-PCS | Mod: S$PBB,,, | Performed by: OBSTETRICS & GYNECOLOGY

## 2019-12-05 PROCEDURE — 1126F AMNT PAIN NOTED NONE PRSNT: CPT | Mod: ,,, | Performed by: OBSTETRICS & GYNECOLOGY

## 2019-12-05 PROCEDURE — 77063 BREAST TOMOSYNTHESIS BI: CPT | Mod: 26,,, | Performed by: RADIOLOGY

## 2019-12-05 PROCEDURE — 1159F PR MEDICATION LIST DOCUMENTED IN MEDICAL RECORD: ICD-10-PCS | Mod: ,,, | Performed by: OBSTETRICS & GYNECOLOGY

## 2019-12-05 PROCEDURE — 77067 MAMMO DIGITAL SCREENING BILAT WITH TOMOSYNTHESIS_CAD: ICD-10-PCS | Mod: 26,,, | Performed by: RADIOLOGY

## 2019-12-05 PROCEDURE — 99212 OFFICE O/P EST SF 10 MIN: CPT | Mod: PBBFAC,PO | Performed by: OBSTETRICS & GYNECOLOGY

## 2019-12-05 PROCEDURE — 1159F MED LIST DOCD IN RCRD: CPT | Mod: ,,, | Performed by: OBSTETRICS & GYNECOLOGY

## 2019-12-05 RX ORDER — DORZOLAMIDE HYDROCHLORIDE AND TIMOLOL MALEATE 20; 5 MG/ML; MG/ML
SOLUTION/ DROPS OPHTHALMIC
Refills: 3 | COMMUNITY
Start: 2019-10-11 | End: 2022-08-09 | Stop reason: SDUPTHER

## 2019-12-05 RX ORDER — NYSTATIN 100000 [USP'U]/G
POWDER TOPICAL
Qty: 1 BOTTLE | Refills: 3 | Status: SHIPPED | OUTPATIENT
Start: 2019-12-05 | End: 2022-11-29

## 2019-12-05 RX ORDER — CLOTRIMAZOLE AND BETAMETHASONE DIPROPIONATE 10; .64 MG/G; MG/G
CREAM TOPICAL 2 TIMES DAILY
Qty: 15 G | Refills: 1 | Status: SHIPPED | OUTPATIENT
Start: 2019-12-05 | End: 2021-09-15 | Stop reason: SDUPTHER

## 2019-12-05 NOTE — TELEPHONE ENCOUNTER
Patient advised that there were no openings today at our facility.  Offered to schedule at another facility or go to .  Patient will go to urgent care.

## 2019-12-05 NOTE — TELEPHONE ENCOUNTER
----- Message from Jeimy Eddy sent at 12/5/2019  1:00 PM CST -----  Contact: self [phone]  Patient would like to be seen today. She wants to come in and sit and wait to be seen. Please call and advise.

## 2019-12-05 NOTE — PROGRESS NOTES
"GYNECOLOGY  :  Huber Pena is a 69 y.o.  Here today for routine gynecological examination  Refers "dryness" and itching on groin area bilateral   Has been using mycostatin ointment  with some relief       Last PAP 1 year ago  Last mammogram  2016  normal       ROS  GENERAL: Denies significant weight gain or weight loss. Feeling well overall.  SKIN: Denies rash or lesions.  Normal skin turgor  HEAD: Denies head injury or headache.   NODES: Denies enlarged lymph nodes.   CHEST: Denies chest pain or shortness of breath.   CARDIOVASCULAR: Denies palpitations or left sided chest pain.   ABDOMEN: No abdominal pain,no  diarrhea,constipation  nausea, vomiting or rectal bleeding.   URINARY: normal  Frequency,no  Dysuria or burning on urination.   REPRODUCTIVE: No abnormal bleeding.No vaginal discharge.   BREASTS: The patient sometimes performs breast self-examination and denies pain, lumps, or nipple discharge.   HEMATOLOGIC: No easy bruisability or excessive bleeding.   MUSCULOSKELETAL: Denies joint pain or swelling.   NEUROLOGIC: Denies syncope or weakness.   PSYCHIATRIC: Denies depression, anxiety or mood swings.    Physical Exam   Constitutional: She is oriented to person, place, and time. She appears well-developed and well-nourished. No distress.   HENT:   Head: Normocephalic.   NECK: Neck symmetric without masses or thyromegaly.  Cardiovascular: Normal rate and regular rhythm.   Pulmonary/Chest: Effort normal and breath sounds normal. No respiratory distress. She has no wheezes.   Breasts: Symmetrical, no skin changes or visible lesions. No palpable masses, nipple discharge or adenopathy bilaterally.  Abdominal: Soft not distended. Bowel sounds are normal. She exhibits   no masses . No tenderness to palpation.   Genitourinary: Pelvic exam was performed with patient supine.   External genitalia normal no lesions.Normal hair distribution . Erythema on bilateral groin area, yeast infection "   Adequate perineal body,normal urethral meatus   Vagina moist and well rugated without lesions, no vaginal  Discharge.   Cervix pink and without lesions. No bleeding. No significant cystocele or rectocele.  Bimanual exam difficult due to body habitus    Urethra and bladder normal  Extremities normal no cyanosis ,edema.     Procedures:      A/P Huber Pena 69 y.o.     Dx=  1-Routine gynecological examination   2- Post Menopause  3- skin yeast infection   Plan:  Routine gyn   -s/p normal breast exam   -s/p normal pelvic exam:    Patient was counseled today on A.C.S. Pap guidelines and recommendations for yearly pelvic exams, mammograms and monthly self breast exams; to see her PCP for other health maintenance, nutrition and weight gain counseling, discussed lab values.  Discussed colonoscopy recommendations every 10 years starting at age 50   Calcium and vit D daily intake     F/u in 1 yr or JESSICAN    Dane Sparks M.D.   OB/GYN

## 2019-12-06 ENCOUNTER — LAB VISIT (OUTPATIENT)
Dept: LAB | Facility: HOSPITAL | Age: 69
End: 2019-12-06
Attending: FAMILY MEDICINE
Payer: MEDICARE

## 2019-12-06 DIAGNOSIS — Z13.6 ENCOUNTER FOR SCREENING FOR CARDIOVASCULAR DISORDERS: ICD-10-CM

## 2019-12-06 DIAGNOSIS — M85.80 OSTEOPENIA, UNSPECIFIED LOCATION: ICD-10-CM

## 2019-12-06 DIAGNOSIS — K59.09 CHRONIC CONSTIPATION: ICD-10-CM

## 2019-12-06 DIAGNOSIS — M89.9 BONE DISORDER: ICD-10-CM

## 2019-12-06 LAB
25(OH)D3+25(OH)D2 SERPL-MCNC: 43 NG/ML (ref 30–96)
ALBUMIN SERPL BCP-MCNC: 4 G/DL (ref 3.5–5.2)
ALP SERPL-CCNC: 86 U/L (ref 55–135)
ALT SERPL W/O P-5'-P-CCNC: 19 U/L (ref 10–44)
ANION GAP SERPL CALC-SCNC: 8 MMOL/L (ref 8–16)
AST SERPL-CCNC: 20 U/L (ref 10–40)
BASOPHILS # BLD AUTO: 0.01 K/UL (ref 0–0.2)
BASOPHILS NFR BLD: 0.1 % (ref 0–1.9)
BILIRUB SERPL-MCNC: 0.5 MG/DL (ref 0.1–1)
BUN SERPL-MCNC: 13 MG/DL (ref 8–23)
CALCIUM SERPL-MCNC: 10.4 MG/DL (ref 8.7–10.5)
CHLORIDE SERPL-SCNC: 106 MMOL/L (ref 95–110)
CHOLEST SERPL-MCNC: 226 MG/DL (ref 120–199)
CHOLEST/HDLC SERPL: 2.8 {RATIO} (ref 2–5)
CO2 SERPL-SCNC: 27 MMOL/L (ref 23–29)
CREAT SERPL-MCNC: 0.8 MG/DL (ref 0.5–1.4)
DIFFERENTIAL METHOD: ABNORMAL
EOSINOPHIL # BLD AUTO: 0 K/UL (ref 0–0.5)
EOSINOPHIL NFR BLD: 0 % (ref 0–8)
ERYTHROCYTE [DISTWIDTH] IN BLOOD BY AUTOMATED COUNT: 13.8 % (ref 11.5–14.5)
EST. GFR  (AFRICAN AMERICAN): >60 ML/MIN/1.73 M^2
EST. GFR  (NON AFRICAN AMERICAN): >60 ML/MIN/1.73 M^2
GLUCOSE SERPL-MCNC: 112 MG/DL (ref 70–110)
HCT VFR BLD AUTO: 44.3 % (ref 37–48.5)
HDLC SERPL-MCNC: 82 MG/DL (ref 40–75)
HDLC SERPL: 36.3 % (ref 20–50)
HGB BLD-MCNC: 14.6 G/DL (ref 12–16)
LDLC SERPL CALC-MCNC: 137.2 MG/DL (ref 63–159)
LYMPHOCYTES # BLD AUTO: 0.8 K/UL (ref 1–4.8)
LYMPHOCYTES NFR BLD: 9.3 % (ref 18–48)
MCH RBC QN AUTO: 30.9 PG (ref 27–31)
MCHC RBC AUTO-ENTMCNC: 33 G/DL (ref 32–36)
MCV RBC AUTO: 94 FL (ref 82–98)
MONOCYTES # BLD AUTO: 0.3 K/UL (ref 0.3–1)
MONOCYTES NFR BLD: 3.4 % (ref 4–15)
NEUTROPHILS # BLD AUTO: 7.7 K/UL (ref 1.8–7.7)
NEUTROPHILS NFR BLD: 87.2 % (ref 38–73)
NONHDLC SERPL-MCNC: 144 MG/DL
PLATELET # BLD AUTO: 268 K/UL (ref 150–350)
PMV BLD AUTO: 10.8 FL (ref 9.2–12.9)
POTASSIUM SERPL-SCNC: 4.8 MMOL/L (ref 3.5–5.1)
PROT SERPL-MCNC: 8.6 G/DL (ref 6–8.4)
RBC # BLD AUTO: 4.73 M/UL (ref 4–5.4)
SODIUM SERPL-SCNC: 141 MMOL/L (ref 136–145)
T4 FREE SERPL-MCNC: 0.96 NG/DL (ref 0.71–1.51)
TRIGL SERPL-MCNC: 34 MG/DL (ref 30–150)
TSH SERPL DL<=0.005 MIU/L-ACNC: 0.3 UIU/ML (ref 0.4–4)
WBC # BLD AUTO: 8.85 K/UL (ref 3.9–12.7)

## 2019-12-06 PROCEDURE — 82306 VITAMIN D 25 HYDROXY: CPT

## 2019-12-06 PROCEDURE — 80053 COMPREHEN METABOLIC PANEL: CPT

## 2019-12-06 PROCEDURE — 80061 LIPID PANEL: CPT

## 2019-12-06 PROCEDURE — 85025 COMPLETE CBC W/AUTO DIFF WBC: CPT

## 2019-12-06 PROCEDURE — 84443 ASSAY THYROID STIM HORMONE: CPT

## 2019-12-06 PROCEDURE — 36415 COLL VENOUS BLD VENIPUNCTURE: CPT

## 2019-12-06 PROCEDURE — 84439 ASSAY OF FREE THYROXINE: CPT

## 2019-12-13 ENCOUNTER — OFFICE VISIT (OUTPATIENT)
Dept: FAMILY MEDICINE | Facility: CLINIC | Age: 69
End: 2019-12-13
Payer: MEDICARE

## 2019-12-13 VITALS
SYSTOLIC BLOOD PRESSURE: 128 MMHG | TEMPERATURE: 98 F | WEIGHT: 180 LBS | OXYGEN SATURATION: 98 % | DIASTOLIC BLOOD PRESSURE: 66 MMHG | HEART RATE: 75 BPM | HEIGHT: 66 IN | BODY MASS INDEX: 28.93 KG/M2

## 2019-12-13 DIAGNOSIS — R73.09 ELEVATED GLUCOSE: ICD-10-CM

## 2019-12-13 DIAGNOSIS — Z00.00 ROUTINE GENERAL MEDICAL EXAMINATION AT A HEALTH CARE FACILITY: Primary | ICD-10-CM

## 2019-12-13 DIAGNOSIS — R79.89 ABNORMAL TSH: ICD-10-CM

## 2019-12-13 DIAGNOSIS — R73.03 PREDIABETES: ICD-10-CM

## 2019-12-13 DIAGNOSIS — F41.9 ANXIETY: ICD-10-CM

## 2019-12-13 DIAGNOSIS — M85.80 OSTEOPENIA, UNSPECIFIED LOCATION: ICD-10-CM

## 2019-12-13 DIAGNOSIS — S86.912A STRAIN OF LEFT KNEE AND LEG, INITIAL ENCOUNTER: ICD-10-CM

## 2019-12-13 PROCEDURE — 99213 OFFICE O/P EST LOW 20 MIN: CPT | Mod: PBBFAC,25,PO | Performed by: FAMILY MEDICINE

## 2019-12-13 PROCEDURE — 99999 PR PBB SHADOW E&M-EST. PATIENT-LVL III: ICD-10-PCS | Mod: PBBFAC,,, | Performed by: FAMILY MEDICINE

## 2019-12-13 PROCEDURE — 99397 PER PM REEVAL EST PAT 65+ YR: CPT | Mod: 25,S$PBB,, | Performed by: FAMILY MEDICINE

## 2019-12-13 PROCEDURE — 99999 PR PBB SHADOW E&M-EST. PATIENT-LVL III: CPT | Mod: PBBFAC,,, | Performed by: FAMILY MEDICINE

## 2019-12-13 PROCEDURE — 99397 PR PREVENTIVE VISIT,EST,65 & OVER: ICD-10-PCS | Mod: 25,S$PBB,, | Performed by: FAMILY MEDICINE

## 2019-12-13 PROCEDURE — 90732 PPSV23 VACC 2 YRS+ SUBQ/IM: CPT | Mod: PBBFAC,PO

## 2019-12-13 RX ORDER — ALPRAZOLAM 0.5 MG/1
0.5 TABLET ORAL
Qty: 30 TABLET | Refills: 2 | Status: SHIPPED | OUTPATIENT
Start: 2019-12-13 | End: 2021-09-15 | Stop reason: SDUPTHER

## 2019-12-13 RX ORDER — MELOXICAM 7.5 MG/1
7.5 TABLET ORAL DAILY PRN
Qty: 30 TABLET | Refills: 2 | Status: SHIPPED | OUTPATIENT
Start: 2019-12-13 | End: 2021-09-15 | Stop reason: SDUPTHER

## 2019-12-13 RX ORDER — AZITHROMYCIN 250 MG/1
TABLET, FILM COATED ORAL
Refills: 0 | COMMUNITY
Start: 2019-12-05 | End: 2019-12-13

## 2019-12-13 RX ORDER — PREDNISONE 20 MG/1
TABLET ORAL
Refills: 0 | COMMUNITY
Start: 2019-12-05 | End: 2019-12-13

## 2019-12-13 NOTE — PROGRESS NOTES
(Portions of this note were dictated using voice recognition software and may contain dictation related errors in spelling/grammar/syntax not found on text review)    CC:  Annual    HPI: 69 y.o. female last visit December 2018.  Here for annual exam    Anxiety, on occasional alprazolam, rarely uses.  Needs a refill from last year    Left knee osteoarthritis, status post TKA in August 2018.  Overall doing well but does occasionally use meloxicam.  Needs a refill from last year    Osteopenia, DEXA was normal from 2018    Labs recently done show mildly elevated glucose of 112.  Also very mildly suppressed TSH.  These need to be reassessed.  Patient states that she has been off tract this last year with on healthy diet and also with not any exercise.  Sometimes finds it difficult to get to the gym with her work schedule.        Past Medical History:   Diagnosis Date    Anxiety     Chronic constipation     DDD (degenerative disc disease), lumbar     Fibroid tumor     Hyperplastic colon polyp     Osteoarthritis of left knee     Osteopenia     Pes planus        Past Surgical History:   Procedure Laterality Date    BREAST BIOPSY      left breast    BREAST CYST EXCISION      Breast reduction  2004    COLONOSCOPY N/A 10/10/2018    Procedure: COLONOSCOPY;  Surgeon: Cristal Whiteside MD;  Location: [facility] ENDO;  Service: Endoscopy;  Laterality: N/A;    PERCUTANEOUS CRYOTHERAPY OF PERIPHERAL NERVE USING LIQUID NITROUS OXIDE IN CLOSED NEEDLE DEVICE Left 8/23/2018    Procedure: CRYOTHERAPY, NERVE, PERIPHERAL, PERCUTANEOUS, USING LIQUID NITROUS OXIDE IN CLOSED NEEDLE DEVICE;  Surgeon: FRANK Null;  Location: [facility] OR;  Service: Orthopedics;  Laterality: Left;    TOTAL KNEE ARTHROPLASTY Left     TOTAL REDUCTION MAMMOPLASTY      TUBAL LIGATION         Family History   Problem Relation Age of Onset    Breast cancer Maternal Aunt     Colon cancer Cousin     Diabetes Sister         x2    Diabetes Mother      No Known Problems Father     No Known Problems Brother     No Known Problems Maternal Uncle     No Known Problems Paternal Aunt     No Known Problems Paternal Uncle     No Known Problems Maternal Grandmother     No Known Problems Maternal Grandfather     No Known Problems Paternal Grandmother     No Known Problems Paternal Grandfather     Amblyopia Neg Hx     Blindness Neg Hx     Cancer Neg Hx     Cataracts Neg Hx     Glaucoma Neg Hx     Hypertension Neg Hx     Macular degeneration Neg Hx     Retinal detachment Neg Hx     Strabismus Neg Hx     Stroke Neg Hx     Thyroid disease Neg Hx     Melanoma Neg Hx        Social History     Socioeconomic History    Marital status:      Spouse name: Not on file    Number of children: Not on file    Years of education: Not on file    Highest education level: Not on file   Occupational History    Not on file   Social Needs    Financial resource strain: Not on file    Food insecurity:     Worry: Not on file     Inability: Not on file    Transportation needs:     Medical: Not on file     Non-medical: Not on file   Tobacco Use    Smoking status: Never Smoker    Smokeless tobacco: Never Used   Substance and Sexual Activity    Alcohol use: No     Alcohol/week: 0.0 standard drinks     Frequency: Never     Binge frequency: Never    Drug use: No    Sexual activity: Yes     Partners: Male     Birth control/protection: Post-menopausal   Lifestyle    Physical activity:     Days per week: Not on file     Minutes per session: Not on file    Stress: Not on file   Relationships    Social connections:     Talks on phone: Not on file     Gets together: Not on file     Attends [employer] service: Not on file     Active member of club or organization: Not on file     Attends meetings of clubs or organizations: Not on file     Relationship status: Not on file   Other Topics Concern    Are you pregnant or think you may be? Not Asked    Breast-feeding Not  Asked   Social History Narrative    Not on file     Lab Results   Component Value Date    WBC 8.85 12/06/2019    HGB 14.6 12/06/2019    HCT 44.3 12/06/2019     12/06/2019    CHOL 226 (H) 12/06/2019    TRIG 34 12/06/2019    HDL 82 (H) 12/06/2019    ALT 19 12/06/2019    AST 20 12/06/2019     12/06/2019    K 4.8 12/06/2019     12/06/2019    CREATININE 0.8 12/06/2019    CALCIUM 10.4 12/06/2019    ALBUMIN 4.0 12/06/2019    BUN 13 12/06/2019    CO2 27 12/06/2019    TSH 0.304 (L) 12/06/2019    INR 1.0 07/30/2018    HGBA1C 5.4 07/30/2018    LDLCALC 137.2 12/06/2019     (H) 12/06/2019                 Vital signs reviewed  PE:   APPEARANCE: Well nourished, well developed, in no acute distress.    HEAD: Normocephalic, atraumatic.  EYES: PERRL. EOMI.   Conjunctivae noninjected.  EARS: TM's intact. Light reflex normal. No retraction or perforation.    NOSE: Mucosa pink. Airway clear.  MOUTH & THROAT: No tonsillar enlargement. No pharyngeal erythema or exudate.   NECK: Supple with no cervical lymphadenopathy.  No carotid bruits.  No thyromegaly  CHEST: Good inspiratory effort. Lungs clear to auscultation with no wheezes or crackles.  CARDIOVASCULAR: Normal S1, S2. No rubs, murmurs, or gallops.  ABDOMEN: Bowel sounds normal. Not distended. Soft. No tenderness or masses. No organomegaly.  EXTREMITIES: No edema, cyanosis, or clubbing.      IMPRESSION  1. Routine general medical examination at a health care facility    2. Strain of left knee and leg, initial encounter    3. Osteopenia, unspecified location    4. Abnormal TSH    5. Elevated glucose    6. Prediabetes     7. Anxiety        PLAN  Orders Placed This Encounter   Procedures    (In Office Administered) Pneumococcal Polysaccharide Vaccine (23 Valent) (SQ/IM)    Comprehensive metabolic panel    Lipid panel    Hemoglobin A1c    TSH     Reviewed labs as above .  Glucose in IFG range.  Did discuss getting back on track with healthy diet, regular  exercise, and recheck labs above in 3 months.  Will also recheck lipids and also TSH given mild abnormality on most recent testing    Anxiety:  Stable, just occasional need for Xanax.  This has been refilled    Osteoarthritis pain:  Refilled meloxicam for occasional use    Encourage other ways to get regular exercise instead of necessarily having to go to the gym if this is difficult for her to do time wise.  Can do some circuit training type resistance exercises in quick succession at home to help with more aerobic type modality.  Also able to look on youtube for free instructional videos on home exercises.    SCREENINGS     Immunizations:  tdap 2015  Flu:  utd   Pneumococcal vaccination:  [id number]  2018.  Pneumovax due  Zoster 10/3/13     Age/Gender Appropriate screenings:  mmg 12/05/2019  dexa 12/28/19 nml,   Colonoscopy  10/10/2018.  Normal.  Repeat 5 years  Pap smear 12/05/2019, Dr. Sparks

## 2020-02-20 ENCOUNTER — TELEPHONE (OUTPATIENT)
Dept: FAMILY MEDICINE | Facility: CLINIC | Age: 70
End: 2020-02-20

## 2020-02-20 NOTE — TELEPHONE ENCOUNTER
----- Message from Purple Blue Bo Forrest sent at 2/20/2020  3:58 PM CST -----  Contact: [phone]/self  Type:  Sooner Apoointment Request    Caller is requesting a sooner appointment.  Caller declined first available appointment listed below.  Caller will not accept being placed on the waitlist and is requesting a message be sent to doctor.  Name of Caller:PATIENT   When is the first available appointment? 3-2-2020  Symptoms:FOOT PAIN  Would the patient rather a call back or a response via [facility]? CALLBACK  Best Call Back Number:[phone]  Additional Information: PATIENT REQUESTING TO BE SEEN ON 2-21-20 AFTER 2:45PM

## 2020-02-21 ENCOUNTER — OFFICE VISIT (OUTPATIENT)
Dept: FAMILY MEDICINE | Facility: CLINIC | Age: 70
End: 2020-02-21
Payer: MEDICARE

## 2020-02-21 VITALS
HEART RATE: 73 BPM | BODY MASS INDEX: 29.58 KG/M2 | WEIGHT: 184.06 LBS | OXYGEN SATURATION: 99 % | HEIGHT: 66 IN | SYSTOLIC BLOOD PRESSURE: 134 MMHG | TEMPERATURE: 98 F | DIASTOLIC BLOOD PRESSURE: 78 MMHG

## 2020-02-21 DIAGNOSIS — M79.671 RIGHT FOOT PAIN: Primary | ICD-10-CM

## 2020-02-21 DIAGNOSIS — M21.619 BUNION: ICD-10-CM

## 2020-02-21 DIAGNOSIS — M21.41 ACQUIRED PES PLANUS, RIGHT: ICD-10-CM

## 2020-02-21 PROCEDURE — 99999 PR PBB SHADOW E&M-EST. PATIENT-LVL IV: CPT | Mod: PBBFAC,,, | Performed by: FAMILY MEDICINE

## 2020-02-21 PROCEDURE — 99999 PR PBB SHADOW E&M-EST. PATIENT-LVL IV: ICD-10-PCS | Mod: PBBFAC,,, | Performed by: FAMILY MEDICINE

## 2020-02-21 PROCEDURE — 99214 OFFICE O/P EST MOD 30 MIN: CPT | Mod: PBBFAC,PO | Performed by: FAMILY MEDICINE

## 2020-02-21 PROCEDURE — 99214 PR OFFICE/OUTPT VISIT, EST, LEVL IV, 30-39 MIN: ICD-10-PCS | Mod: S$PBB,,, | Performed by: FAMILY MEDICINE

## 2020-02-21 PROCEDURE — 99214 OFFICE O/P EST MOD 30 MIN: CPT | Mod: S$PBB,,, | Performed by: FAMILY MEDICINE

## 2020-02-21 NOTE — PROGRESS NOTES
(Portions of this note were dictated using voice recognition software and may contain dictation related errors in spelling/grammar/syntax not found on text review)    CC:   Chief Complaint   Patient presents with    Foot Pain     Right Foot       HPI: 69 y.o. female Annual exam in December.  Here for urgent care visit for foot pain      Prior evaluation by Podiatry in 2018 with right foot x-ray shows prominent hallux valgus and bunion formation.  No acute fracture, hypertrophic degenerative change about tarsal metatarsal bones.  Note from 2018 reviewed.  At that time had just, out of left knee replacement and was probably over compensating with right lower extremity during rehab.  Was advised on ice, NSAIDs, Darco shoe.      States that about 3 weeks ago she started back with foot pain on the right side, more lateral aspect of this.  Denies any recent trauma.  She is a  and does walk around and is on her feet a lot.  States that she will get some occasional swelling lateral foot.  Pain can be bothersome at nighttime.  NSAIDs to help but she does not like to take this very often.  No left foot pains.  Does have bunion on the left but this does not hurt.      Past Medical History:   Diagnosis Date    Anxiety     Chronic constipation     DDD (degenerative disc disease), lumbar     Fibroid tumor     Hyperplastic colon polyp     Osteoarthritis of left knee     Osteopenia     Pes planus        Past Surgical History:   Procedure Laterality Date    BREAST BIOPSY      left breast    BREAST CYST EXCISION      Breast reduction  2004    COLONOSCOPY N/A 10/10/2018    Procedure: COLONOSCOPY;  Surgeon: Cristal Whiteside MD;  Location: [facility];  Service: Endoscopy;  Laterality: N/A;    PERCUTANEOUS CRYOTHERAPY OF PERIPHERAL NERVE USING LIQUID NITROUS OXIDE IN CLOSED NEEDLE DEVICE Left 8/23/2018    Procedure: CRYOTHERAPY, NERVE, PERIPHERAL, PERCUTANEOUS, USING LIQUID NITROUS OXIDE IN CLOSED NEEDLE  DEVICE;  Surgeon: FRANK Null;  Location: [facility];  Service: Orthopedics;  Laterality: Left;    TOTAL KNEE ARTHROPLASTY Left     TOTAL REDUCTION MAMMOPLASTY      TUBAL LIGATION         Family History   Problem Relation Age of Onset    Breast cancer Maternal Aunt     Colon cancer Cousin     Diabetes Sister         x2    Diabetes Mother     No Known Problems Father     No Known Problems Brother     No Known Problems Maternal Uncle     No Known Problems Paternal Aunt     No Known Problems Paternal Uncle     No Known Problems Maternal Grandmother     No Known Problems Maternal Grandfather     No Known Problems Paternal Grandmother     No Known Problems Paternal Grandfather     Amblyopia Neg Hx     Blindness Neg Hx     Cancer Neg Hx     Cataracts Neg Hx     Glaucoma Neg Hx     Hypertension Neg Hx     Macular degeneration Neg Hx     Retinal detachment Neg Hx     Strabismus Neg Hx     Stroke Neg Hx     Thyroid disease Neg Hx     Melanoma Neg Hx        Social History     Tobacco Use    Smoking status: Never Smoker    Smokeless tobacco: Never Used   Substance Use Topics    Alcohol use: No     Alcohol/week: 0.0 standard drinks     Frequency: Never     Binge frequency: Never    Drug use: No       Lab Results   Component Value Date    WBC 8.85 12/06/2019    HGB 14.6 12/06/2019    HCT 44.3 12/06/2019    MCV 94 12/06/2019     12/06/2019    CHOL 226 (H) 12/06/2019    TRIG 34 12/06/2019    HDL 82 (H) 12/06/2019    ALT 19 12/06/2019    AST 20 12/06/2019    BILITOT 0.5 12/06/2019    ALKPHOS 86 12/06/2019     12/06/2019    K 4.8 12/06/2019     12/06/2019    CREATININE 0.8 12/06/2019    ESTGFRAFRICA >60 12/06/2019    EGFRNONAA >60 12/06/2019    CALCIUM 10.4 12/06/2019    ALBUMIN 4.0 12/06/2019    BUN 13 12/06/2019    CO2 27 12/06/2019    TSH 0.304 (L) 12/06/2019    INR 1.0 07/30/2018    HGBA1C 5.4 07/30/2018    LDLCALC 137.2 12/06/2019     (H) 12/06/2019                  ROS:  GENERAL: No fever, chills, fatigability or weight loss.  SKIN: No rashes, no itching.  HEAD: No headaches.  EYES: No visual changes  EARS: No ear pain or changes in hearing.  NOSE: No congestion or rhinorrhea.  MOUTH & THROAT: No hoarseness, change in voice, or sore throat.  NODES: Denies swollen glands.  CHEST: Denies HARPER, cyanosis, wheezing, cough and sputum production.  CARDIOVASCULAR: Denies chest pain, PND, orthopnea.  ABDOMEN: No nausea, vomiting, or changes in bowel function.  URINARY: No flank pain, dysuria or hematuria.  PERIPHERAL VASCULAR: No claudication or cyanosis.  MUSCULOSKELETAL:  Above  NEUROLOGIC: No weakness or numbness.    Vital signs reviewed  PE:   APPEARANCE: Well nourished, well developed, in no acute distress.    HEAD: Normocephalic, atraumatic.  EYES:  Conjunctivae noninjected.  MSK:  Right foot demonstrates significant pes planus, hallux valgus, over pronation.  She does have some hypertrophy noted midfoot dorsomedial aspect and does have noted DJD in this region on x-ray which was reviewed with her.  She describes pain over lateral foot.  No base of 5th metatarsal pain. Does have some pain along the course of the peroneal tendons posterior and inferior to the lateral malleolus.  No malleolar deformity or tenderness directly.  Normal active, passive, resisted dorsiflexion, plantar flexion, inversion, eversion of the ankle.  Able to do a right heel raise although still not much development of arch during this procedure.    IMPRESSION  1. Right foot pain    2. Acquired pes planus, right    3. Bunion            PLAN  25 min visit greater than 50% counseling.  Reviewed her x-rays from 2018.  I think that her significant pes planus and over pronation is sufficiently altering her foot mechanics and could be contributing to some compressive forces more laterally which is the location of her pain. She is advised on arch supports (using rigid plastic insole such as Spenco).  Can  take NSAIDs as needed.  Some OTC analgesics topical treatments of also been printed out for her that she can rub on at nighttime.  If symptoms are significantly worsening and patient is having increasing difficulty with walking, can get updated x-rays and referral back to Podiatry in case any custom orthotics are needed or any other intervention is needed.

## 2020-02-21 NOTE — PATIENT INSTRUCTIONS
[facility] ARCH SUPPORTS      ARCH STRETCHES/STRENGTHENING EXERCISES.    You may begin exercising the muscles of your foot right away by gently stretching them as follows:     Prone hip extension: Lie on your stomach with your legs straight out behind you. Tighten the buttocks and thigh muscles of your injured leg and lift it off the floor about 8 inches. Keep your knee straight. Hold for 5 seconds. Then lower your leg and relax. Do 3 sets of 10.   Towel stretch: Sit on a hard surface with one leg stretched out in front of you. Loop a towel around your toes and the ball of your foot and pull the towel toward your body keeping your knee straight. Hold this position for 15 to 30 seconds then relax. Repeat 3 times.   When the towel stretch becomes too easy, you may begin doing the standing calf stretch.   Standing calf stretch: Facing a wall, put your hands against the wall at about eye level. Keep one leg back with the heel on the floor, and the other leg forward. Turn your back foot slightly inward (as if you were pigeon-toed) as you slowly lean into the wall until you feel a stretch in the back of your calf. Hold for 15 to 30 seconds. Repeat 3 times and then switch the position of your legs and repeat the exercise 3 times. Do this exercise several times each day.   Sitting plantar fascia stretch: Sit in a chair and cross one foot over your other knee. Grab the base of your toes and pull them back toward your leg until you feel a comfortable stretch. Hold 15 seconds and repeat 3 times.     When you can stand comfortably on your injured foot, you can begin standing to stretch the bottom of your foot using the plantar fascia stretch.       Achilles stretch: Stand with the ball of one foot on a stair. Reach for the bottom step with your heel until you feel a stretch in the arch of your foot. Hold this position for 15 to 30 seconds and then relax. Repeat 3 times.     After you have stretched the bottom muscles of your foot,  you can begin strengthening the top muscles of your foot.  Frozen can roll: Roll your bare injured foot back and forth from your heel to your mid-arch over a frozen juice can. Repeat for 3 to 5 minutes. This exercise is particularly helpful if done first thing in the morning.   Towel pickup: With your heel on the ground,  a towel with your toes. Release. Repeat 10 to 20 times. When this gets easy, add more resistance by placing a book or small weight on the towel.   Balance and reach exercises   Stand upright next to a chair with your injured leg farthest from the chair. This will provide you with support if you need it. Stand just on the foot of your injured leg. Try to raise the arch of this foot while keeping your toes on the floor.   1. Keep your foot in this position and reach forward in front of you with the hand farthest away from the chair, allowing your knee to bend. Repeat this 10 times while maintaining the arch height. This exercise can be made more difficult by reaching farther in front of you. Do 2 sets.   2.  the same position as above. While maintaining your arch height, reach the hand farthest away from the chair across your body toward the chair. The farther you reach, the more challenging the exercise. Do 2 sets of 10.   Heel raise: Balance yourself while standing behind a chair or counter. Using the chair to help you, raise your body up onto your toes and hold for 5 seconds. Then slowly lower yourself down without holding onto the chair. Hold onto the chair or counter if you need to. When this exercise becomes less painful, try lowering on one leg only. Repeat 10 times. Do 3 sets of 10.   Side-lying leg lift: Lying on your uninjured side, tighten the front thigh muscles on your top leg and lift that leg 8 to 10 inches away from the other leg. Keep the leg straight and lower slowly. Do 3 sets of 10.     Written by Radha Frost MS, PT, and Ciara Franklin PT, [facility], [facility], for  "[employer].   Published by [employer].  © 2009 [facility] and/or its affiliates. All Rights Reserved                Over the counter pain therapies (creams/patch):    1. Lidocaine patch    2. aspercreme    3. "2 old goats"    4. "blue emu"    5. salonpas     7. biofreeze      "

## 2020-03-17 ENCOUNTER — PATIENT MESSAGE (OUTPATIENT)
Dept: FAMILY MEDICINE | Facility: CLINIC | Age: 70
End: 2020-03-17

## 2020-03-17 ENCOUNTER — LAB VISIT (OUTPATIENT)
Dept: LAB | Facility: HOSPITAL | Age: 70
End: 2020-03-17
Attending: FAMILY MEDICINE
Payer: MEDICARE

## 2020-03-17 DIAGNOSIS — R79.89 ABNORMAL TSH: ICD-10-CM

## 2020-03-17 DIAGNOSIS — Z00.00 ROUTINE GENERAL MEDICAL EXAMINATION AT A HEALTH CARE FACILITY: ICD-10-CM

## 2020-03-17 DIAGNOSIS — R73.03 PREDIABETES: ICD-10-CM

## 2020-03-17 DIAGNOSIS — R73.09 ELEVATED GLUCOSE: ICD-10-CM

## 2020-03-17 LAB
ALBUMIN SERPL BCP-MCNC: 3.8 G/DL (ref 3.5–5.2)
ALP SERPL-CCNC: 78 U/L (ref 55–135)
ALT SERPL W/O P-5'-P-CCNC: 20 U/L (ref 10–44)
ANION GAP SERPL CALC-SCNC: 7 MMOL/L (ref 8–16)
AST SERPL-CCNC: 23 U/L (ref 10–40)
BILIRUB SERPL-MCNC: 0.8 MG/DL (ref 0.1–1)
BUN SERPL-MCNC: 10 MG/DL (ref 8–23)
CALCIUM SERPL-MCNC: 10 MG/DL (ref 8.7–10.5)
CHLORIDE SERPL-SCNC: 107 MMOL/L (ref 95–110)
CHOLEST SERPL-MCNC: 198 MG/DL (ref 120–199)
CHOLEST/HDLC SERPL: 2.8 {RATIO} (ref 2–5)
CO2 SERPL-SCNC: 28 MMOL/L (ref 23–29)
CREAT SERPL-MCNC: 0.8 MG/DL (ref 0.5–1.4)
EST. GFR  (AFRICAN AMERICAN): >60 ML/MIN/1.73 M^2
EST. GFR  (NON AFRICAN AMERICAN): >60 ML/MIN/1.73 M^2
ESTIMATED AVG GLUCOSE: 117 MG/DL (ref 68–131)
GLUCOSE SERPL-MCNC: 69 MG/DL (ref 70–110)
HBA1C MFR BLD HPLC: 5.7 % (ref 4–5.6)
HDLC SERPL-MCNC: 71 MG/DL (ref 40–75)
HDLC SERPL: 35.9 % (ref 20–50)
LDLC SERPL CALC-MCNC: 112.8 MG/DL (ref 63–159)
NONHDLC SERPL-MCNC: 127 MG/DL
POTASSIUM SERPL-SCNC: 4.6 MMOL/L (ref 3.5–5.1)
PROT SERPL-MCNC: 7.7 G/DL (ref 6–8.4)
SODIUM SERPL-SCNC: 142 MMOL/L (ref 136–145)
TRIGL SERPL-MCNC: 71 MG/DL (ref 30–150)
TSH SERPL DL<=0.005 MIU/L-ACNC: 0.83 UIU/ML (ref 0.4–4)

## 2020-03-17 PROCEDURE — 84443 ASSAY THYROID STIM HORMONE: CPT

## 2020-03-17 PROCEDURE — 80061 LIPID PANEL: CPT

## 2020-03-17 PROCEDURE — 36415 COLL VENOUS BLD VENIPUNCTURE: CPT

## 2020-03-17 PROCEDURE — 83036 HEMOGLOBIN GLYCOSYLATED A1C: CPT

## 2020-03-17 PROCEDURE — 80053 COMPREHEN METABOLIC PANEL: CPT

## 2020-03-17 NOTE — TELEPHONE ENCOUNTER
Dear Huber Pena    Your recent labs were reviewed and released to your account. Your lab results were mostly normal.  Diabetes test was very slightly in the borderline category..  Healthy diet and exercise should help with this.  Please let me know if you have any questions.    Ney Clayton MD

## 2020-03-31 ENCOUNTER — PATIENT MESSAGE (OUTPATIENT)
Dept: FAMILY MEDICINE | Facility: CLINIC | Age: 70
End: 2020-03-31

## 2020-07-02 ENCOUNTER — TELEPHONE (OUTPATIENT)
Dept: FAMILY MEDICINE | Facility: CLINIC | Age: 70
End: 2020-07-02

## 2020-07-02 NOTE — TELEPHONE ENCOUNTER
Spoke to pt and states that he  stated that the sore looks like a blister under arm. Pt was offered an appt with another physician, but declined. Stated that she wanted to see Dr. Clayton and if the sore worsen she will call back.

## 2020-07-02 NOTE — TELEPHONE ENCOUNTER
----- Message from Amita Hoyt sent at 7/2/2020  2:56 PM CDT -----  Regarding: call back  Contact: [phone]  Patient is requesting to talk to nurse in regards to a sore under her arm pierre and breast and would like to get advice.

## 2020-08-25 ENCOUNTER — TELEPHONE (OUTPATIENT)
Dept: FAMILY MEDICINE | Facility: CLINIC | Age: 70
End: 2020-08-25

## 2020-08-25 DIAGNOSIS — R73.09 ELEVATED HEMOGLOBIN A1C: Primary | ICD-10-CM

## 2020-08-25 DIAGNOSIS — Z01.84 ENCOUNTER FOR ANTIBODY RESPONSE EXAMINATION: ICD-10-CM

## 2020-08-25 DIAGNOSIS — R73.01 IFG (IMPAIRED FASTING GLUCOSE): ICD-10-CM

## 2020-08-25 NOTE — TELEPHONE ENCOUNTER
Orders Placed This Encounter   Procedures    Basic metabolic panel    Hemoglobin A1C    COVID-19 (SARS CoV-2) IgG Antibody

## 2020-08-25 NOTE — TELEPHONE ENCOUNTER
----- Message from Marietta Mane sent at 8/25/2020 10:53 AM CDT -----  Pt need to have blood work done before she can go back to work. Pt  want nurse to call her at [phone] to explain to her what to do next.

## 2020-09-23 ENCOUNTER — OFFICE VISIT (OUTPATIENT)
Dept: FAMILY MEDICINE | Facility: CLINIC | Age: 70
End: 2020-09-23
Payer: MEDICARE

## 2020-09-23 DIAGNOSIS — L98.9 SKIN LESION: ICD-10-CM

## 2020-09-23 DIAGNOSIS — R73.03 PREDIABETES: Primary | ICD-10-CM

## 2020-09-23 PROCEDURE — 99213 PR OFFICE/OUTPT VISIT, EST, LEVL III, 20-29 MIN: ICD-10-PCS | Mod: 95,,, | Performed by: FAMILY MEDICINE

## 2020-09-23 PROCEDURE — 99213 OFFICE O/P EST LOW 20 MIN: CPT | Mod: 95,,, | Performed by: FAMILY MEDICINE

## 2020-09-23 NOTE — PROGRESS NOTES
The patient location is: home  The chief complaint leading to consultation is:  No chief complaint on file.    Visit type: Virtual visit with synchronous audio and video  Total time spent with patient:  20 min  Each patient to whom he or she provides medical services by telemedicine is:  (1) informed of the relationship between the physician and patient and the respective role of any other health care provider with respect to management of the patient; and (2) notified that he or she may decline to receive medical services by telemedicine and may withdraw from such care at any time.    (Portions of this note were dictated using voice recognition software and may contain dictation related errors in spelling/grammar/syntax not found on text review)         HPI: 70 y.o. female  Annual exam 12/2019    Anxiety, on occasional alprazolam, rarely uses.     Left knee osteoarthritis, status post TKA in August 2018.  Overall doing well but does occasionally use meloxicam.  Needs a refill from last year    Osteopenia, DEXA was normal from 2018    Mildly high a1c 5.7 last check below. Working on eating habits but feels she could do better. Walks 5-6 days weekly.  Would like to get recheck.    She would like to check with her insurance if they cover CoVID antibody testing.  She denies any recent illnesses of concern    Does have a growth on the back of her neck.  States that she had a cyst on her face 1 time and she feels that this is probably similar.  It is not painful.  It has been present for almost a year.      Past Medical History:   Diagnosis Date    Anxiety     Chronic constipation     DDD (degenerative disc disease), lumbar     Fibroid tumor     Hyperplastic colon polyp     Osteoarthritis of left knee     Osteopenia     Pes planus        Past Surgical History:   Procedure Laterality Date    BREAST BIOPSY      left breast    BREAST CYST EXCISION      Breast reduction  2004    COLONOSCOPY N/A 10/10/2018     Procedure: COLONOSCOPY;  Surgeon: Cristal Whiteside MD;  Location: [facility] ENDO;  Service: Endoscopy;  Laterality: N/A;    PERCUTANEOUS CRYOTHERAPY OF PERIPHERAL NERVE USING LIQUID NITROUS OXIDE IN CLOSED NEEDLE DEVICE Left 8/23/2018    Procedure: CRYOTHERAPY, NERVE, PERIPHERAL, PERCUTANEOUS, USING LIQUID NITROUS OXIDE IN CLOSED NEEDLE DEVICE;  Surgeon: FRANK Null;  Location: [facility] OR;  Service: Orthopedics;  Laterality: Left;    TOTAL KNEE ARTHROPLASTY Left     TOTAL REDUCTION MAMMOPLASTY      TUBAL LIGATION         Family History   Problem Relation Age of Onset    Breast cancer Maternal Aunt     Colon cancer Cousin     Diabetes Sister         x2    Diabetes Mother     No Known Problems Father     No Known Problems Brother     No Known Problems Maternal Uncle     No Known Problems Paternal Aunt     No Known Problems Paternal Uncle     No Known Problems Maternal Grandmother     No Known Problems Maternal Grandfather     No Known Problems Paternal Grandmother     No Known Problems Paternal Grandfather     Amblyopia Neg Hx     Blindness Neg Hx     Cancer Neg Hx     Cataracts Neg Hx     Glaucoma Neg Hx     Hypertension Neg Hx     Macular degeneration Neg Hx     Retinal detachment Neg Hx     Strabismus Neg Hx     Stroke Neg Hx     Thyroid disease Neg Hx     Melanoma Neg Hx        Social History     Tobacco Use    Smoking status: Never Smoker    Smokeless tobacco: Never Used   Substance Use Topics    Alcohol use: No     Alcohol/week: 0.0 standard drinks     Frequency: Never     Binge frequency: Never    Drug use: No       Lab Results   Component Value Date    WBC 8.85 12/06/2019    HGB 14.6 12/06/2019    HCT 44.3 12/06/2019    MCV 94 12/06/2019     12/06/2019    CHOL 198 03/17/2020    TRIG 71 03/17/2020    HDL 71 03/17/2020    ALT 20 03/17/2020    AST 23 03/17/2020    BILITOT 0.8 03/17/2020    ALKPHOS 78 03/17/2020     03/17/2020    K 4.6 03/17/2020      03/17/2020    CREATININE 0.8 03/17/2020    ESTGFRAFRICA >60 03/17/2020    EGFRNONAA >60 03/17/2020    CALCIUM 10.0 03/17/2020    ALBUMIN 3.8 03/17/2020    BUN 10 03/17/2020    CO2 28 03/17/2020    TSH 0.835 03/17/2020    INR 1.0 07/30/2018    HGBA1C 5.7 (H) 03/17/2020    LDLCALC 112.8 03/17/2020    GLU 69 (L) 03/17/2020           Hemoglobin A1C (%)   Date Value   03/17/2020 5.7 (H)   07/30/2018 5.4   07/26/2013 5.8   07/10/2008 5.2       Answers for HPI/ROS submitted by the patient on 9/21/2020   Rash  Chronicity: recurrent  Onset: more than 1 month ago  Progression since onset: waxing and waning  Affected locations: genitalia  Characteristics: pain, itchiness  Exposed to: a new detergent/soap  anorexia: No  congestion: No  cough: No  diarrhea: No  eye pain: No  facial edema: No  fatigue: No  fever: No  joint pain: No  nail changes: No  rhinorrhea: No  shortness of breath: No  sore throat: No  vomiting: No  Treatments tried: nothing, anti-itch cream  Improvement on treatment: moderate  asthma: No  allergies: Yes  eczema: No  varicella: No      PE (elements able to be captured on virtual encounter)  APPEARANCE: Well nourished, well developed, in no acute distress.    HEAD: Normocephalic, atraumatic.  EYES:  .   Conjunctivae noninjected.  RESPIRATORY:  No increased work of breathing or objective visual signs of dyspnea  PSYCHIATRIC:  Normal mood and affect, alert and oriented, appropriate answers to questions         Impression  1. Prediabetes    2. Skin lesion            Plan  We discussed dietary and exercise modification.  Will check BMP and A1c again.  She will let us know when she finds out from her insurance whether COVID antibody testing is covered.  If so she would like to get the antibody test with the BMP and A1c; if not, she will just get the BMP and A1c done at that time    Neck lesion:  By description possibly Pilar cyst.  Not bothersome at this time.  She plans on coming in around December for her  physical, can get evaluated at that time.

## 2020-11-23 ENCOUNTER — TELEPHONE (OUTPATIENT)
Dept: FAMILY MEDICINE | Facility: CLINIC | Age: 70
End: 2020-11-23

## 2020-11-23 NOTE — TELEPHONE ENCOUNTER
----- Message from Jessica Santos sent at 11/23/2020  2:24 PM CST -----  Contact: [phone]  Type:  Patient Returning Call    Who Called:PT  Who Left Message for Patient: Alise  Does the patient know what this is regarding?: Appt  Would the patient rather a call back or a response via [facility]? Call back  Best Call Back Number: [phone]

## 2020-11-23 NOTE — TELEPHONE ENCOUNTER
----- Message from Deidre Patricia sent at 11/21/2020 10:46 AM CST -----  Contact: [phone]  Pt is requesting the following Labs, Mammo, Bone Density, Pap smear to be scheduled on a Saturday due to having to work throughout the week.      Please call and advise

## 2020-11-24 ENCOUNTER — TELEPHONE (OUTPATIENT)
Dept: FAMILY MEDICINE | Facility: CLINIC | Age: 70
End: 2020-11-24

## 2020-11-24 DIAGNOSIS — M85.88 OTHER SPECIFIED DISORDERS OF BONE DENSITY AND STRUCTURE, OTHER SITE: ICD-10-CM

## 2020-11-24 DIAGNOSIS — M85.80 OSTEOPENIA, UNSPECIFIED LOCATION: Primary | ICD-10-CM

## 2020-11-24 DIAGNOSIS — Z12.31 SCREENING MAMMOGRAM, ENCOUNTER FOR: ICD-10-CM

## 2020-11-24 NOTE — TELEPHONE ENCOUNTER
Labs look up-to-date from March 2020 except blood sugar and A1c test which were reordered  dated September 23rd and can be done.  Please schedule for patient for her annual as requested.  Will order mammogram and bone density that can be done after their respective dates this year as well

## 2020-11-24 NOTE — TELEPHONE ENCOUNTER
Left msg for CB to inform pt that her labs look up-to-date from March 2020 except blood sugar and A1c test which were reordered  dated September 23rd and can be done. Please schedule for patient for her annual as requested. Will order mammogram and bone density that can be done after their respective dates this year as well

## 2020-11-24 NOTE — TELEPHONE ENCOUNTER
Spoke to pt and stated that she would like orders for her mammogram, labs, bone density and pap. Pt was informed to call Dr. Sparks to schedule her Well-Woman exam. Pt asked when was her last Annual with Dr. Clayton and was informed 12/13/19. Pt asked why she was scheduled for an Annual on 12/4. Pt would like to be seen around her last Annual date of the 13th. Pls advise.

## 2020-11-24 NOTE — TELEPHONE ENCOUNTER
----- Message from Clay Holden sent at 11/24/2020 10:47 AM CST -----  Regarding: Returning Call  Contact: Self/ [phone]  Patient called in returning your call. Please advise.

## 2020-11-25 ENCOUNTER — TELEPHONE (OUTPATIENT)
Dept: FAMILY MEDICINE | Facility: CLINIC | Age: 70
End: 2020-11-25

## 2020-11-25 NOTE — TELEPHONE ENCOUNTER
----- Message from Alexy Clayton sent at 11/24/2020  4:57 PM CST -----  Contact: [phone]/patient  Patient returning your call  Please advise

## 2020-11-25 NOTE — TELEPHONE ENCOUNTER
Inform pt that her labs look up-to-date from March 2020 except blood sugar and A1c test which were reordered dated September 23rd and can be done. Please schedule for patient for her annual as requested. Will order mammogram and bone density that can be done after their respective dates this year as well. Pt was given the phone number for the [facility] to scheduled her appts on the same day. Pt's Annual was reschedule to due her Annual being scheduled to early.

## 2020-12-03 ENCOUNTER — TELEPHONE (OUTPATIENT)
Dept: FAMILY MEDICINE | Facility: CLINIC | Age: 70
End: 2020-12-03

## 2020-12-03 NOTE — TELEPHONE ENCOUNTER
----- Message from Emmie Baires sent at 12/3/2020 12:28 PM CST -----  Contact: [phone]  Who Called: PT  Regarding: returning call  Would the patient rather a call back or a response via [facility]? 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

## 2020-12-09 ENCOUNTER — PATIENT OUTREACH (OUTPATIENT)
Dept: ADMINISTRATIVE | Facility: OTHER | Age: 70
End: 2020-12-09

## 2020-12-10 NOTE — PROGRESS NOTES
Chart reviewed.   Immunizations: updated  Orders placed: n/a  Upcoming appts to satisfy INDIRA topics: Mammogram 12/12

## 2020-12-11 ENCOUNTER — OFFICE VISIT (OUTPATIENT)
Dept: DERMATOLOGY | Facility: CLINIC | Age: 70
End: 2020-12-11
Payer: MEDICARE

## 2020-12-11 DIAGNOSIS — L72.3 INFLAMED EPIDERMOID CYST OF SKIN: Primary | ICD-10-CM

## 2020-12-11 PROCEDURE — 99999 PR PBB SHADOW E&M-EST. PATIENT-LVL III: ICD-10-PCS | Mod: PBBFAC,,, | Performed by: PHYSICIAN ASSISTANT

## 2020-12-11 PROCEDURE — 99213 OFFICE O/P EST LOW 20 MIN: CPT | Mod: PBBFAC | Performed by: PHYSICIAN ASSISTANT

## 2020-12-11 PROCEDURE — 87070 CULTURE OTHR SPECIMN AEROBIC: CPT

## 2020-12-11 PROCEDURE — 87186 SC STD MICRODIL/AGAR DIL: CPT | Mod: 59

## 2020-12-11 PROCEDURE — 99999 PR PBB SHADOW E&M-EST. PATIENT-LVL III: CPT | Mod: PBBFAC,,, | Performed by: PHYSICIAN ASSISTANT

## 2020-12-11 PROCEDURE — 10060 I&D ABSCESS SIMPLE/SINGLE: CPT | Mod: PBBFAC | Performed by: PHYSICIAN ASSISTANT

## 2020-12-11 PROCEDURE — 99213 PR OFFICE/OUTPT VISIT, EST, LEVL III, 20-29 MIN: ICD-10-PCS | Mod: 25,S$PBB,, | Performed by: PHYSICIAN ASSISTANT

## 2020-12-11 PROCEDURE — 87077 CULTURE AEROBIC IDENTIFY: CPT | Mod: 59

## 2020-12-11 PROCEDURE — 10060 I&D ABSCESS SIMPLE/SINGLE: CPT | Mod: S$PBB,,, | Performed by: PHYSICIAN ASSISTANT

## 2020-12-11 PROCEDURE — 99213 OFFICE O/P EST LOW 20 MIN: CPT | Mod: 25,S$PBB,, | Performed by: PHYSICIAN ASSISTANT

## 2020-12-11 PROCEDURE — 10060 PR DRAIN SKIN ABSCESS SIMPLE: ICD-10-PCS | Mod: S$PBB,,, | Performed by: PHYSICIAN ASSISTANT

## 2020-12-11 RX ORDER — DOXYCYCLINE 100 MG/1
TABLET ORAL
Qty: 20 TABLET | Refills: 0 | Status: SHIPPED | OUTPATIENT
Start: 2020-12-11 | End: 2022-08-09

## 2020-12-11 RX ORDER — MUPIROCIN 20 MG/G
OINTMENT TOPICAL
Qty: 22 G | Refills: 1 | Status: SHIPPED | OUTPATIENT
Start: 2020-12-11 | End: 2022-08-09

## 2020-12-11 NOTE — PROGRESS NOTES
Subjective:       Patient ID:  Huber Pena is a 70 y.o. female who presents for   Chief Complaint   Patient presents with    Recurrent Skin Infections     neck     History of Present Illness: The patient presents with chief complaint of boil.  Location: neck  Duration: yrs but asymptomatic until 1-2 months ago  Signs/Symptoms: swollen, painful  Prior treatments: none      Review of Systems   Constitutional: Negative for fever and chills.   Skin: Positive for tendency to form keloidal scars.   Hematologic/Lymphatic: Does not bruise/bleed easily.        Objective:    Physical Exam   Constitutional: She appears well-developed and well-nourished. No distress.   Neurological: She is alert and oriented to person, place, and time. She is not disoriented.   Psychiatric: She has a normal mood and affect.   Skin:   Areas Examined (abnormalities noted in diagram):   Neck Inspection Performed              Diagram Legend     Erythematous scaling macule/papule c/w actinic keratosis       Vascular papule c/w angioma      Pigmented verrucoid papule/plaque c/w seborrheic keratosis      Yellow umbilicated papule c/w sebaceous hyperplasia      Irregularly shaped tan macule c/w lentigo     1-2 mm smooth white papules consistent with Milia      Movable subcutaneous cyst with punctum c/w epidermal inclusion cyst      Subcutaneous movable cyst c/w pilar cyst      Firm pink to brown papule c/w dermatofibroma      Pedunculated fleshy papule(s) c/w skin tag(s)      Evenly pigmented macule c/w junctional nevus     Mildly variegated pigmented, slightly irregular-bordered macule c/w mildly atypical nevus      Flesh colored to evenly pigmented papule c/w intradermal nevus       Pink pearly papule/plaque c/w basal cell carcinoma      Erythematous hyperkeratotic cursted plaque c/w SCC      Surgical scar with no sign of skin cancer recurrence      Open and closed comedones      Inflammatory papules and pustules      Verrucoid papule  consistent consistent with wart     Erythematous eczematous patches and plaques     Dystrophic onycholytic nail with subungual debris c/w onychomycosis     Umbilicated papule    Erythematous-base heme-crusted tan verrucoid plaque consistent with inflamed seborrheic keratosis     Erythematous Silvery Scaling Plaque c/w Psoriasis     See annotation        Assessment / Plan:      Inflamed epidermoid cyst of skin  -     doxycycline monohydrate 100 mg Tab; Take 1 po bid.  Dispense: 20 tablet; Refill: 0  -     mupirocin (BACTROBAN) 2 % ointment; AAA neck bid.  Dispense: 22 g; Refill: 1  -     Aerobic culture    Discussed benefits and risks of doxycyline therapy including but not limited to GI discomfort, esophageal irritation/ulceration, and increased sun sensitivity. Patient was counseled to take medicine with meals and at least 1 hour before lying down.     Lesion incised with #11 blade and drained on today's date. Bacterial culture performed.          Follow up if symptoms worsen or fail to improve.

## 2020-12-12 ENCOUNTER — HOSPITAL ENCOUNTER (OUTPATIENT)
Dept: RADIOLOGY | Facility: HOSPITAL | Age: 70
Discharge: HOME | End: 2020-12-12
Attending: FAMILY MEDICINE
Payer: MEDICARE

## 2020-12-12 DIAGNOSIS — Z12.31 SCREENING MAMMOGRAM, ENCOUNTER FOR: ICD-10-CM

## 2020-12-12 PROCEDURE — 77067 MAMMO DIGITAL SCREENING BILAT WITH TOMO: ICD-10-PCS | Mod: 26,,, | Performed by: RADIOLOGY

## 2020-12-12 PROCEDURE — 77063 MAMMO DIGITAL SCREENING BILAT WITH TOMO: ICD-10-PCS | Mod: 26,,, | Performed by: RADIOLOGY

## 2020-12-12 PROCEDURE — 77067 SCR MAMMO BI INCL CAD: CPT | Mod: TC

## 2020-12-12 PROCEDURE — 77067 SCR MAMMO BI INCL CAD: CPT | Mod: 26,,, | Performed by: RADIOLOGY

## 2020-12-12 PROCEDURE — 77063 BREAST TOMOSYNTHESIS BI: CPT | Mod: 26,,, | Performed by: RADIOLOGY

## 2020-12-14 ENCOUNTER — PATIENT MESSAGE (OUTPATIENT)
Dept: FAMILY MEDICINE | Facility: CLINIC | Age: 70
End: 2020-12-14

## 2020-12-14 NOTE — TELEPHONE ENCOUNTER
Dear Huber Pena     Just a note that your CoVID antibody test was negative.  Also your blood sugar/diabetes test  are now completely within normal range     Ney Clayton MD

## 2020-12-14 NOTE — PROGRESS NOTES
Please let pt know culture is positive for a bacteria that is sensitive to doxy - continue bid until course is complete.

## 2020-12-16 LAB — BACTERIA SPEC AEROBE CULT: ABNORMAL

## 2020-12-17 ENCOUNTER — TELEPHONE (OUTPATIENT)
Dept: DERMATOLOGY | Facility: CLINIC | Age: 70
End: 2020-12-17

## 2020-12-18 ENCOUNTER — OFFICE VISIT (OUTPATIENT)
Dept: FAMILY MEDICINE | Facility: CLINIC | Age: 70
End: 2020-12-18
Payer: MEDICARE

## 2020-12-18 VITALS
HEIGHT: 66 IN | SYSTOLIC BLOOD PRESSURE: 128 MMHG | HEART RATE: 70 BPM | TEMPERATURE: 98 F | OXYGEN SATURATION: 95 % | DIASTOLIC BLOOD PRESSURE: 64 MMHG | BODY MASS INDEX: 26.93 KG/M2 | WEIGHT: 167.56 LBS

## 2020-12-18 DIAGNOSIS — Z00.00 ROUTINE GENERAL MEDICAL EXAMINATION AT A HEALTH CARE FACILITY: Primary | ICD-10-CM

## 2020-12-18 DIAGNOSIS — L72.0 EIC (EPIDERMAL INCLUSION CYST): ICD-10-CM

## 2020-12-18 DIAGNOSIS — R73.09 ELEVATED HEMOGLOBIN A1C: ICD-10-CM

## 2020-12-18 DIAGNOSIS — M85.80 OSTEOPENIA, UNSPECIFIED LOCATION: ICD-10-CM

## 2020-12-18 PROCEDURE — 99999 PR PBB SHADOW E&M-EST. PATIENT-LVL III: CPT | Mod: PBBFAC,,, | Performed by: FAMILY MEDICINE

## 2020-12-18 PROCEDURE — 99213 OFFICE O/P EST LOW 20 MIN: CPT | Mod: PBBFAC,PO | Performed by: FAMILY MEDICINE

## 2020-12-18 PROCEDURE — 99213 OFFICE O/P EST LOW 20 MIN: CPT | Mod: S$PBB,,, | Performed by: FAMILY MEDICINE

## 2020-12-18 PROCEDURE — 99999 PR PBB SHADOW E&M-EST. PATIENT-LVL III: ICD-10-PCS | Mod: PBBFAC,,, | Performed by: FAMILY MEDICINE

## 2020-12-18 PROCEDURE — 99213 PR OFFICE/OUTPT VISIT, EST, LEVL III, 20-29 MIN: ICD-10-PCS | Mod: S$PBB,,, | Performed by: FAMILY MEDICINE

## 2020-12-18 NOTE — PROGRESS NOTES
HPI: 70 y.o. female  Annual exam 12/2019    Anxiety, on occasional alprazolam, rarely uses.     Left knee osteoarthritis, status post TKA in August 2018.  Overall doing well but does occasionally use meloxicam.    Osteopenia, DEXA was normal from 2018    Mildly high a1c 5.7 in the past but improved on recheck    Had EIC that had gotten infected, drained by dermatology.  Started on doxycycline Culture grew out Citrobacter, intermediate sensitivity to tetracyclines although patient states that her symptoms have improved.    Exercises regularly, eats a healthy diet     Past Medical History:   Diagnosis Date    Anxiety     Chronic constipation     DDD (degenerative disc disease), lumbar     Fibroid tumor     Hyperplastic colon polyp     Osteoarthritis of left knee     Osteopenia     Pes planus        Past Surgical History:   Procedure Laterality Date    BREAST CYST EXCISION      Breast reduction  2004    COLONOSCOPY N/A 10/10/2018    Procedure: COLONOSCOPY;  Surgeon: Cristal Whiteside MD;  Location: [facility] ENDO;  Service: Endoscopy;  Laterality: N/A;    PERCUTANEOUS CRYOTHERAPY OF PERIPHERAL NERVE USING LIQUID NITROUS OXIDE IN CLOSED NEEDLE DEVICE Left 8/23/2018    Procedure: CRYOTHERAPY, NERVE, PERIPHERAL, PERCUTANEOUS, USING LIQUID NITROUS OXIDE IN CLOSED NEEDLE DEVICE;  Surgeon: FRANK Null;  Location: [facility] OR;  Service: Orthopedics;  Laterality: Left;    TOTAL KNEE ARTHROPLASTY Left     TOTAL REDUCTION MAMMOPLASTY      TUBAL LIGATION         Family History   Problem Relation Age of Onset    Breast cancer Maternal Aunt     Colon cancer Cousin     Diabetes Sister         x2    Breast cancer Sister     Diabetes Mother     No Known Problems Father     No Known Problems Brother     No Known Problems Maternal Uncle     No Known Problems Paternal Aunt     No Known Problems Paternal Uncle     No Known Problems Maternal Grandmother     No Known Problems Maternal Grandfather     No  Known Problems Paternal Grandmother     No Known Problems Paternal Grandfather     Amblyopia Neg Hx     Blindness Neg Hx     Cancer Neg Hx     Cataracts Neg Hx     Glaucoma Neg Hx     Hypertension Neg Hx     Macular degeneration Neg Hx     Retinal detachment Neg Hx     Strabismus Neg Hx     Stroke Neg Hx     Thyroid disease Neg Hx     Melanoma Neg Hx        Social History     Tobacco Use    Smoking status: Never Smoker    Smokeless tobacco: Never Used   Substance Use Topics    Alcohol use: No     Alcohol/week: 0.0 standard drinks     Frequency: Never     Binge frequency: Never    Drug use: No       Lab Results   Component Value Date    WBC 8.85 12/06/2019    HGB 14.6 12/06/2019    HCT 44.3 12/06/2019    MCV 94 12/06/2019     12/06/2019    CHOL 198 03/17/2020    TRIG 71 03/17/2020    HDL 71 03/17/2020    ALT 20 03/17/2020    AST 23 03/17/2020    BILITOT 0.8 03/17/2020    ALKPHOS 78 03/17/2020     12/12/2020    K 4.3 12/12/2020     12/12/2020    CREATININE 0.7 12/12/2020    ESTGFRAFRICA >60 12/12/2020    EGFRNONAA >60 12/12/2020    CALCIUM 9.1 12/12/2020    ALBUMIN 3.8 03/17/2020    BUN 17 12/12/2020    CO2 24 12/12/2020    TSH 0.835 03/17/2020    INR 1.0 07/30/2018    HGBA1C 5.3 12/12/2020    LDLCALC 112.8 03/17/2020    GLU 86 12/12/2020     vitamin-D level has been normal last in 2019 at 43.      Hemoglobin A1C (%)   Date Value   12/12/2020 5.3   03/17/2020 5.7 (H)   07/30/2018 5.4   07/26/2013 5.8   07/10/2008 5.2     Vital signs reviewed  PE:   APPEARANCE: Well nourished, well developed, in no acute distress.    HEAD: Normocephalic, atraumatic.  EYES: PERRL. EOMI.   Conjunctivae noninjected.  EARS: TM's intact. Light reflex normal. No retraction or perforation  NECK: Supple with no cervical lymphadenopathy.  No carotid bruits.  No thyromegaly.  Posterior EIC status post drainage, much smaller in size versus picture noted on dermatology note.  No tenderness.  No surrounding  redness.  CHEST: Good inspiratory effort. Lungs clear to auscultation with no wheezes or crackles.  CARDIOVASCULAR: Normal S1, S2. No rubs, murmurs, or gallops.  ABDOMEN: Bowel sounds normal. Not distended. Soft. No tenderness or masses. No organomegaly.  EXTREMITIES: No edema, cyanosis, or clubbing.           Impression  1. Routine general medical examination at a health care facility    2. Osteopenia, unspecified location            Plan  Reviewed labs, stable    Continue healthy diet and exercise    Infected EIC status post drainage, improving on exam.  No signs of any residual cellulitis.  Can finish off her current antibiotics            SCREENINGS     Immunizations:  tdap 2015  Flu:   up-to-date in September  Pneumococcal vaccination:  [id number]  2018.  PVX 2019  Zoster 10/3/13     Age/Gender Appropriate screenings:  mmg 12/12/2020  dexa 12/28/18 nml, repeat scheduled  Colonoscopy  10/10/2018.  Normal.  Repeat 5 years  Pap smear 12/05/2019, Dr. Sparks

## 2020-12-21 ENCOUNTER — TELEPHONE (OUTPATIENT)
Dept: DERMATOLOGY | Facility: CLINIC | Age: 70
End: 2020-12-21

## 2020-12-21 NOTE — TELEPHONE ENCOUNTER
Returned pt's call, received no answer. Left message.    RD        ----- Message from Nicolette Gaytan sent at 12/21/2020  9:17 AM CST -----  Regarding: update  Type:  Patient Returning Call    Who Called:patient   Who Left Message for Patient:n/a  Does the patient know what this is regarding?:update   Would the patient rather a call back or a response via [facility]? Call back   Best Call Back Number:[phone]  Additional Information: n/a

## 2020-12-21 NOTE — TELEPHONE ENCOUNTER
Informed pt culture results. Pt verbalized understanding.    RD          ----- Message from Sophy Smith sent at 12/21/2020  3:37 PM CST -----  Contact: Pt [phone]  Pt is returning Annie Verma LPN phone call. She stated that she really need to speak with      Pt [phone]

## 2020-12-29 ENCOUNTER — HOSPITAL ENCOUNTER (OUTPATIENT)
Dept: RADIOLOGY | Facility: HOSPITAL | Age: 70
Discharge: HOME | End: 2020-12-29
Attending: FAMILY MEDICINE
Payer: MEDICARE

## 2020-12-29 DIAGNOSIS — M85.88 OTHER SPECIFIED DISORDERS OF BONE DENSITY AND STRUCTURE, OTHER SITE: ICD-10-CM

## 2020-12-29 DIAGNOSIS — M85.80 OSTEOPENIA, UNSPECIFIED LOCATION: ICD-10-CM

## 2020-12-29 PROCEDURE — 77080 DXA BONE DENSITY AXIAL: CPT | Mod: 26,,, | Performed by: RADIOLOGY

## 2020-12-29 PROCEDURE — 77080 DEXA BONE DENSITY SPINE HIP: ICD-10-PCS | Mod: 26,,, | Performed by: RADIOLOGY

## 2020-12-29 PROCEDURE — 77080 DXA BONE DENSITY AXIAL: CPT | Mod: TC

## 2020-12-30 ENCOUNTER — TELEPHONE (OUTPATIENT)
Dept: FAMILY MEDICINE | Facility: CLINIC | Age: 70
End: 2020-12-30

## 2021-01-04 ENCOUNTER — IMMUNIZATION (OUTPATIENT)
Dept: OBSTETRICS AND GYNECOLOGY | Facility: CLINIC | Age: 71
End: 2021-01-04
Payer: MEDICARE

## 2021-01-04 DIAGNOSIS — Z23 NEED FOR VACCINATION: ICD-10-CM

## 2021-01-04 PROCEDURE — 91300 COVID-19, MRNA, LNP-S, PF, 30 MCG/0.3 ML DOSE VACCINE: CPT | Mod: PBBFAC

## 2021-01-12 ENCOUNTER — CLINICAL SUPPORT (OUTPATIENT)
Dept: URGENT CARE | Facility: CLINIC | Age: 71
End: 2021-01-12
Payer: MEDICARE

## 2021-01-12 DIAGNOSIS — Z20.822 ENCOUNTER FOR LABORATORY TESTING FOR COVID-19 VIRUS: Primary | ICD-10-CM

## 2021-01-12 LAB
CTP QC/QA: YES
SARS-COV-2 RDRP RESP QL NAA+PROBE: NEGATIVE

## 2021-01-12 PROCEDURE — U0002: ICD-10-PCS | Mod: QW,CR,S$GLB, | Performed by: FAMILY MEDICINE

## 2021-01-12 PROCEDURE — U0002 COVID-19 LAB TEST NON-CDC: HCPCS | Mod: QW,CR,S$GLB, | Performed by: FAMILY MEDICINE

## 2021-01-26 ENCOUNTER — IMMUNIZATION (OUTPATIENT)
Dept: OBSTETRICS AND GYNECOLOGY | Facility: CLINIC | Age: 71
End: 2021-01-26
Payer: MEDICARE

## 2021-01-26 DIAGNOSIS — Z23 NEED FOR VACCINATION: Primary | ICD-10-CM

## 2021-01-26 PROCEDURE — 91300 COVID-19, MRNA, LNP-S, PF, 30 MCG/0.3 ML DOSE VACCINE: CPT | Mod: PBBFAC

## 2021-01-26 PROCEDURE — 0002A COVID-19, MRNA, LNP-S, PF, 30 MCG/0.3 ML DOSE VACCINE: CPT | Mod: PBBFAC

## 2021-06-23 ENCOUNTER — PATIENT MESSAGE (OUTPATIENT)
Dept: FAMILY MEDICINE | Facility: CLINIC | Age: 71
End: 2021-06-23

## 2021-06-24 ENCOUNTER — TELEPHONE (OUTPATIENT)
Dept: FAMILY MEDICINE | Facility: CLINIC | Age: 71
End: 2021-06-24

## 2021-06-25 ENCOUNTER — PATIENT MESSAGE (OUTPATIENT)
Dept: FAMILY MEDICINE | Facility: CLINIC | Age: 71
End: 2021-06-25

## 2021-07-08 ENCOUNTER — OFFICE VISIT (OUTPATIENT)
Dept: OTOLARYNGOLOGY | Facility: CLINIC | Age: 71
End: 2021-07-08
Payer: MEDICARE

## 2021-07-08 DIAGNOSIS — H61.21 IMPACTED CERUMEN OF RIGHT EAR: Primary | ICD-10-CM

## 2021-07-08 PROCEDURE — 99499 UNLISTED E&M SERVICE: CPT | Mod: S$PBB,,, | Performed by: NURSE PRACTITIONER

## 2021-07-08 PROCEDURE — 69210 EAR CERUMEN REMOVAL: ICD-10-PCS | Mod: S$PBB,,, | Performed by: NURSE PRACTITIONER

## 2021-07-08 PROCEDURE — 69210 REMOVE IMPACTED EAR WAX UNI: CPT | Mod: S$PBB,,, | Performed by: NURSE PRACTITIONER

## 2021-07-08 PROCEDURE — 99999 PR PBB SHADOW E&M-EST. PATIENT-LVL II: CPT | Mod: PBBFAC,,, | Performed by: NURSE PRACTITIONER

## 2021-07-08 PROCEDURE — 69210 REMOVE IMPACTED EAR WAX UNI: CPT | Mod: PBBFAC | Performed by: NURSE PRACTITIONER

## 2021-07-08 PROCEDURE — 99499 NO LOS: ICD-10-PCS | Mod: S$PBB,,, | Performed by: NURSE PRACTITIONER

## 2021-07-08 PROCEDURE — 99212 OFFICE O/P EST SF 10 MIN: CPT | Mod: PBBFAC | Performed by: NURSE PRACTITIONER

## 2021-07-08 PROCEDURE — 99999 PR PBB SHADOW E&M-EST. PATIENT-LVL II: ICD-10-PCS | Mod: PBBFAC,,, | Performed by: NURSE PRACTITIONER

## 2021-08-17 ENCOUNTER — PATIENT MESSAGE (OUTPATIENT)
Dept: FAMILY MEDICINE | Facility: CLINIC | Age: 71
End: 2021-08-17

## 2021-08-26 ENCOUNTER — TELEPHONE (OUTPATIENT)
Dept: FAMILY MEDICINE | Facility: CLINIC | Age: 71
End: 2021-08-26

## 2021-08-26 ENCOUNTER — PATIENT OUTREACH (OUTPATIENT)
Dept: ADMINISTRATIVE | Facility: HOSPITAL | Age: 71
End: 2021-08-26

## 2021-08-30 ENCOUNTER — PATIENT MESSAGE (OUTPATIENT)
Dept: INTERNAL MEDICINE | Facility: CLINIC | Age: 71
End: 2021-08-30

## 2021-09-15 ENCOUNTER — OFFICE VISIT (OUTPATIENT)
Dept: FAMILY MEDICINE | Facility: CLINIC | Age: 71
End: 2021-09-15
Payer: MEDICARE

## 2021-09-15 VITALS
DIASTOLIC BLOOD PRESSURE: 60 MMHG | SYSTOLIC BLOOD PRESSURE: 124 MMHG | HEIGHT: 66 IN | HEART RATE: 66 BPM | TEMPERATURE: 98 F | OXYGEN SATURATION: 97 % | BODY MASS INDEX: 27.04 KG/M2

## 2021-09-15 DIAGNOSIS — L30.9 FACIAL DERMATITIS: Primary | ICD-10-CM

## 2021-09-15 DIAGNOSIS — S86.912A STRAIN OF LEFT KNEE AND LEG, INITIAL ENCOUNTER: ICD-10-CM

## 2021-09-15 PROCEDURE — 99999 PR PBB SHADOW E&M-EST. PATIENT-LVL III: ICD-10-PCS | Mod: PBBFAC,,, | Performed by: FAMILY MEDICINE

## 2021-09-15 PROCEDURE — 99214 PR OFFICE/OUTPT VISIT, EST, LEVL IV, 30-39 MIN: ICD-10-PCS | Mod: S$PBB,,, | Performed by: FAMILY MEDICINE

## 2021-09-15 PROCEDURE — 99213 OFFICE O/P EST LOW 20 MIN: CPT | Mod: PBBFAC,PO | Performed by: FAMILY MEDICINE

## 2021-09-15 PROCEDURE — 99214 OFFICE O/P EST MOD 30 MIN: CPT | Mod: S$PBB,,, | Performed by: FAMILY MEDICINE

## 2021-09-15 PROCEDURE — 99999 PR PBB SHADOW E&M-EST. PATIENT-LVL III: CPT | Mod: PBBFAC,,, | Performed by: FAMILY MEDICINE

## 2021-09-15 RX ORDER — CLOTRIMAZOLE AND BETAMETHASONE DIPROPIONATE 10; .64 MG/G; MG/G
CREAM TOPICAL 2 TIMES DAILY
Qty: 15 G | Refills: 1 | Status: SHIPPED | OUTPATIENT
Start: 2021-09-15 | End: 2022-12-28 | Stop reason: SDUPTHER

## 2021-09-15 RX ORDER — DESONIDE 0.5 MG/G
CREAM TOPICAL 2 TIMES DAILY
Qty: 15 G | Refills: 1 | Status: SHIPPED | OUTPATIENT
Start: 2021-09-15 | End: 2023-04-11

## 2021-09-15 RX ORDER — MELOXICAM 7.5 MG/1
7.5 TABLET ORAL DAILY PRN
Qty: 30 TABLET | Refills: 2 | Status: SHIPPED | OUTPATIENT
Start: 2021-09-15 | End: 2021-12-06 | Stop reason: SDUPTHER

## 2021-09-15 RX ORDER — ALPRAZOLAM 0.5 MG/1
0.5 TABLET ORAL
Qty: 30 TABLET | Refills: 0 | Status: SHIPPED | OUTPATIENT
Start: 2021-09-15 | End: 2023-04-11

## 2021-10-26 ENCOUNTER — TELEPHONE (OUTPATIENT)
Dept: FAMILY MEDICINE | Facility: CLINIC | Age: 71
End: 2021-10-26
Payer: MEDICARE

## 2021-10-28 ENCOUNTER — PATIENT MESSAGE (OUTPATIENT)
Dept: FAMILY MEDICINE | Facility: CLINIC | Age: 71
End: 2021-10-28
Payer: MEDICARE

## 2021-10-28 ENCOUNTER — TELEPHONE (OUTPATIENT)
Dept: FAMILY MEDICINE | Facility: CLINIC | Age: 71
End: 2021-10-28
Payer: MEDICARE

## 2021-10-28 DIAGNOSIS — Z12.31 ENCOUNTER FOR SCREENING MAMMOGRAM FOR BREAST CANCER: Primary | ICD-10-CM

## 2021-10-29 ENCOUNTER — TELEPHONE (OUTPATIENT)
Dept: FAMILY MEDICINE | Facility: CLINIC | Age: 71
End: 2021-10-29
Payer: MEDICARE

## 2021-11-01 ENCOUNTER — TELEPHONE (OUTPATIENT)
Dept: FAMILY MEDICINE | Facility: CLINIC | Age: 71
End: 2021-11-01
Payer: MEDICARE

## 2021-11-01 DIAGNOSIS — Z13.6 ENCOUNTER FOR SCREENING FOR CARDIOVASCULAR DISORDERS: ICD-10-CM

## 2021-11-01 DIAGNOSIS — R73.03 PREDIABETES: Primary | ICD-10-CM

## 2021-11-01 DIAGNOSIS — M85.80 OSTEOPENIA, UNSPECIFIED LOCATION: ICD-10-CM

## 2021-11-01 DIAGNOSIS — M85.88 OTHER SPECIFIED DISORDERS OF BONE DENSITY AND STRUCTURE, OTHER SITE: ICD-10-CM

## 2021-12-08 ENCOUNTER — PATIENT OUTREACH (OUTPATIENT)
Dept: ADMINISTRATIVE | Facility: OTHER | Age: 71
End: 2021-12-08
Payer: MEDICARE

## 2021-12-09 ENCOUNTER — OFFICE VISIT (OUTPATIENT)
Dept: OBSTETRICS AND GYNECOLOGY | Facility: CLINIC | Age: 71
End: 2021-12-09
Payer: MEDICARE

## 2021-12-09 VITALS — DIASTOLIC BLOOD PRESSURE: 60 MMHG | SYSTOLIC BLOOD PRESSURE: 110 MMHG | BODY MASS INDEX: 27.4 KG/M2 | WEIGHT: 169.75 LBS

## 2021-12-09 DIAGNOSIS — Z01.419 ROUTINE GYNECOLOGICAL EXAMINATION: Primary | ICD-10-CM

## 2021-12-09 DIAGNOSIS — Z12.39 ENCOUNTER FOR SCREENING FOR MALIGNANT NEOPLASM OF BREAST, UNSPECIFIED SCREENING MODALITY: ICD-10-CM

## 2021-12-09 PROCEDURE — G0101 CA SCREEN;PELVIC/BREAST EXAM: HCPCS | Mod: S$PBB,,, | Performed by: OBSTETRICS & GYNECOLOGY

## 2021-12-09 PROCEDURE — 99999 PR PBB SHADOW E&M-EST. PATIENT-LVL III: CPT | Mod: PBBFAC,,, | Performed by: OBSTETRICS & GYNECOLOGY

## 2021-12-09 PROCEDURE — 99999 PR PBB SHADOW E&M-EST. PATIENT-LVL III: ICD-10-PCS | Mod: PBBFAC,,, | Performed by: OBSTETRICS & GYNECOLOGY

## 2021-12-09 PROCEDURE — 99213 OFFICE O/P EST LOW 20 MIN: CPT | Mod: PBBFAC,PO | Performed by: OBSTETRICS & GYNECOLOGY

## 2021-12-09 PROCEDURE — G0101 PR CA SCREEN;PELVIC/BREAST EXAM: ICD-10-PCS | Mod: S$PBB,,, | Performed by: OBSTETRICS & GYNECOLOGY

## 2021-12-13 ENCOUNTER — HOSPITAL ENCOUNTER (OUTPATIENT)
Dept: RADIOLOGY | Facility: HOSPITAL | Age: 71
Discharge: HOME | End: 2021-12-13
Attending: FAMILY MEDICINE
Payer: MEDICARE

## 2021-12-13 DIAGNOSIS — Z12.31 ENCOUNTER FOR SCREENING MAMMOGRAM FOR BREAST CANCER: ICD-10-CM

## 2021-12-13 PROCEDURE — 77067 SCR MAMMO BI INCL CAD: CPT | Mod: TC

## 2021-12-13 PROCEDURE — 77067 SCR MAMMO BI INCL CAD: CPT | Mod: 26,,, | Performed by: RADIOLOGY

## 2021-12-13 PROCEDURE — 77063 MAMMO DIGITAL SCREENING BILAT WITH TOMO: ICD-10-PCS | Mod: 26,,, | Performed by: RADIOLOGY

## 2021-12-13 PROCEDURE — 77067 MAMMO DIGITAL SCREENING BILAT WITH TOMO: ICD-10-PCS | Mod: 26,,, | Performed by: RADIOLOGY

## 2021-12-13 PROCEDURE — 77063 BREAST TOMOSYNTHESIS BI: CPT | Mod: 26,,, | Performed by: RADIOLOGY

## 2021-12-17 ENCOUNTER — OFFICE VISIT (OUTPATIENT)
Dept: FAMILY MEDICINE | Facility: CLINIC | Age: 71
End: 2021-12-17
Payer: MEDICARE

## 2021-12-17 VITALS
DIASTOLIC BLOOD PRESSURE: 82 MMHG | BODY MASS INDEX: 27.74 KG/M2 | HEIGHT: 66 IN | SYSTOLIC BLOOD PRESSURE: 136 MMHG | OXYGEN SATURATION: 98 % | HEART RATE: 58 BPM | WEIGHT: 172.63 LBS

## 2021-12-17 DIAGNOSIS — Z00.00 ROUTINE GENERAL MEDICAL EXAMINATION AT A HEALTH CARE FACILITY: Primary | ICD-10-CM

## 2021-12-17 DIAGNOSIS — Z71.3 DIETARY COUNSELING: ICD-10-CM

## 2021-12-17 DIAGNOSIS — M17.12 PRIMARY OSTEOARTHRITIS OF LEFT KNEE: ICD-10-CM

## 2021-12-17 DIAGNOSIS — M85.80 OSTEOPENIA, UNSPECIFIED LOCATION: ICD-10-CM

## 2021-12-17 DIAGNOSIS — Z13.6 ENCOUNTER FOR SCREENING FOR CARDIOVASCULAR DISORDERS: ICD-10-CM

## 2021-12-17 PROCEDURE — 99999 PR PBB SHADOW E&M-EST. PATIENT-LVL IV: ICD-10-PCS | Mod: PBBFAC,,, | Performed by: FAMILY MEDICINE

## 2021-12-17 PROCEDURE — 99999 PR PBB SHADOW E&M-EST. PATIENT-LVL IV: CPT | Mod: PBBFAC,,, | Performed by: FAMILY MEDICINE

## 2021-12-17 PROCEDURE — 99214 OFFICE O/P EST MOD 30 MIN: CPT | Mod: PBBFAC,PO | Performed by: FAMILY MEDICINE

## 2021-12-17 PROCEDURE — 99397 PR PREVENTIVE VISIT,EST,65 & OVER: ICD-10-PCS | Mod: S$PBB,,, | Performed by: FAMILY MEDICINE

## 2021-12-17 PROCEDURE — 99397 PER PM REEVAL EST PAT 65+ YR: CPT | Mod: S$PBB,,, | Performed by: FAMILY MEDICINE

## 2022-03-16 ENCOUNTER — PES CALL (OUTPATIENT)
Dept: ADMINISTRATIVE | Facility: CLINIC | Age: 72
End: 2022-03-16
Payer: MEDICARE

## 2022-03-18 DIAGNOSIS — S86.912A STRAIN OF LEFT KNEE AND LEG, INITIAL ENCOUNTER: ICD-10-CM

## 2022-03-18 RX ORDER — MELOXICAM 7.5 MG/1
TABLET ORAL
Qty: 30 TABLET | Refills: 2 | Status: SHIPPED | OUTPATIENT
Start: 2022-03-18 | End: 2023-04-11

## 2022-03-28 ENCOUNTER — PES CALL (OUTPATIENT)
Dept: ADMINISTRATIVE | Facility: CLINIC | Age: 72
End: 2022-03-28
Payer: MEDICARE

## 2022-04-25 ENCOUNTER — PES CALL (OUTPATIENT)
Dept: ADMINISTRATIVE | Facility: CLINIC | Age: 72
End: 2022-04-25
Payer: MEDICARE

## 2022-05-17 ENCOUNTER — PES CALL (OUTPATIENT)
Dept: ADMINISTRATIVE | Facility: CLINIC | Age: 72
End: 2022-05-17
Payer: MEDICARE

## 2022-06-03 ENCOUNTER — TELEPHONE (OUTPATIENT)
Dept: OPHTHALMOLOGY | Facility: CLINIC | Age: 72
End: 2022-06-03
Payer: MEDICARE

## 2022-06-03 NOTE — TELEPHONE ENCOUNTER
Called patient lvm regarding referral      ----- Message from KATARINA Garza sent at 6/2/2022  3:17 PM CDT -----  Regarding: FW: Need referral to Glaucoma specialist  Contact: Pt    ----- Message -----  From: Hemalatha Pastor  Sent: 6/2/2022   3:12 PM CDT  To: Lakisha MELCHOR Staff  Subject: Need referral to Glaucoma specialist             Please send referral for pt to see a Glaucoma specialist. Please have someone contact the pt @   [phone]

## 2022-08-02 ENCOUNTER — PATIENT MESSAGE (OUTPATIENT)
Dept: DERMATOLOGY | Facility: CLINIC | Age: 72
End: 2022-08-02
Payer: MEDICARE

## 2022-08-09 ENCOUNTER — OFFICE VISIT (OUTPATIENT)
Dept: OPHTHALMOLOGY | Facility: CLINIC | Age: 72
End: 2022-08-09
Payer: MEDICARE

## 2022-08-09 ENCOUNTER — CLINICAL SUPPORT (OUTPATIENT)
Dept: OPHTHALMOLOGY | Facility: CLINIC | Age: 72
End: 2022-08-09
Payer: MEDICARE

## 2022-08-09 DIAGNOSIS — H40.023 OPEN ANGLE WITH BORDERLINE FINDINGS AND HIGH GLAUCOMA RISK IN BOTH EYES: Primary | ICD-10-CM

## 2022-08-09 DIAGNOSIS — H52.03 HYPEROPIA WITH PRESBYOPIA OF BOTH EYES: ICD-10-CM

## 2022-08-09 DIAGNOSIS — H25.13 NUCLEAR SCLEROTIC CATARACT OF BOTH EYES: ICD-10-CM

## 2022-08-09 DIAGNOSIS — H52.4 HYPEROPIA WITH PRESBYOPIA OF BOTH EYES: ICD-10-CM

## 2022-08-09 PROCEDURE — 92004 COMPRE OPH EXAM NEW PT 1/>: CPT | Mod: S$PBB,,, | Performed by: OPHTHALMOLOGY

## 2022-08-09 PROCEDURE — 99212 OFFICE O/P EST SF 10 MIN: CPT | Mod: PBBFAC,PO | Performed by: OPHTHALMOLOGY

## 2022-08-09 PROCEDURE — 92083 EXTENDED VISUAL FIELD XM: CPT | Mod: PBBFAC,PO | Performed by: OPHTHALMOLOGY

## 2022-08-09 PROCEDURE — 92083 HUMPHREY VISUAL FIELD - OU - BOTH EYES: ICD-10-PCS | Mod: 26,S$PBB,, | Performed by: OPHTHALMOLOGY

## 2022-08-09 PROCEDURE — 92133 POSTERIOR SEGMENT OCT OPTIC NERVE(OCULAR COHERENCE TOMOGRAPHY) - OU - BOTH EYES: ICD-10-PCS | Mod: 26,S$PBB,, | Performed by: OPHTHALMOLOGY

## 2022-08-09 PROCEDURE — 99999 PR PBB SHADOW E&M-EST. PATIENT-LVL II: CPT | Mod: PBBFAC,,, | Performed by: OPHTHALMOLOGY

## 2022-08-09 PROCEDURE — 92004 PR EYE EXAM, NEW PATIENT,COMPREHESV: ICD-10-PCS | Mod: S$PBB,,, | Performed by: OPHTHALMOLOGY

## 2022-08-09 PROCEDURE — 99999 PR PBB SHADOW E&M-EST. PATIENT-LVL II: ICD-10-PCS | Mod: PBBFAC,,, | Performed by: OPHTHALMOLOGY

## 2022-08-09 PROCEDURE — 92133 CPTRZD OPH DX IMG PST SGM ON: CPT | Mod: PBBFAC,PO | Performed by: OPHTHALMOLOGY

## 2022-08-09 RX ORDER — DORZOLAMIDE HYDROCHLORIDE AND TIMOLOL MALEATE 20; 5 MG/ML; MG/ML
1 SOLUTION/ DROPS OPHTHALMIC 2 TIMES DAILY
Qty: 30 ML | Refills: 3 | Status: SHIPPED | OUTPATIENT
Start: 2022-08-09 | End: 2023-07-11 | Stop reason: SDUPTHER

## 2022-08-09 NOTE — PROGRESS NOTES
KAYLAN RAMÍREZ 03/09/2022  Dr. Painter  Patient states her IOP check was high on last visit. She is here for a   second opinion.    Primary open-angle glaucoma, bilateral, moderate stage    dorzolamide-timolol 2-0.5% BID OU     Last edited by Sukhi De, PCT on 8/9/2022  2:20 PM. (History)            Assessment /Plan     For exam results, see Encounter Report.    Open angle with borderline findings and high glaucoma risk in both eyes    Nuclear sclerotic cataract of both eyes    Hyperopia with presbyopia of both eyes             Glaucoma (type and duration)    First visit: unstaged OU vs glaucoma suspect on gtts    First HVF   8/9/22    First photos   - pending    Treatment / Drops started  - outside doctor             Family history    - + brother / great aunt and uncle         Glaucoma meds    cosopt        H/O adverse rxn to glaucoma drops    Latanoprost (burned eyes... soft CI)        LASERS    none        GLAUCOMA SURGERIES    none        OTHER EYE SURGERIES    none        CDR    07/0.8        Tbase    >20 (24)  (per pt)         Tmax    > 20  (21)   (per pt)         Ttarget    undetermined          HVF    1 test 2022  - not reliable, paracentral/central defects od // inferior and ST defects os        Gonio    Next visit        CCT    1 test 2022 to 2022 - RNFL         OCT    1 test 2022 to 2022 - RNFL - full od // borderline thinning temp os        Disc photos    Pending     - Ttoday    20 OU  - Test done today    DFE OCT IOP HVF      2. Cataracts   - may be VS    - evaluate after next visit    3. Hyperopia / presbyopia       Try and get records from Dr. Longo    RTC in 3 months for IOP // CCT // GONIO (hyperope)  //  repeat HVF - unreliable today  (may need lower IOP)

## 2022-08-09 NOTE — PROGRESS NOTES
OCT DONE OU     24-2  SS DONE OU     REL & FIX =  GOOD OU      COOP =    GOOD     PATIENT DENIES ANY ALLERGIES TO LATEX OR ADHESIVES AT THIS TIME    JTHOMAS    MRX    +.75     OD     +.75 + .50 X 750   OS

## 2022-09-16 ENCOUNTER — TELEPHONE (OUTPATIENT)
Dept: FAMILY MEDICINE | Facility: CLINIC | Age: 72
End: 2022-09-16
Payer: MEDICARE

## 2022-09-16 NOTE — TELEPHONE ENCOUNTER
----- Message from Wolfgang Wren sent at 9/16/2022  1:10 PM CDT -----  Type:  Needs Medical Advice    Who Called: self  Reason:says her BP has been running high  Would the patient rather a call back or a response via [facility]? call  Best Call Back Number: [phone]  Additional Information: none

## 2022-10-17 ENCOUNTER — OFFICE VISIT (OUTPATIENT)
Dept: FAMILY MEDICINE | Facility: CLINIC | Age: 72
End: 2022-10-17
Payer: MEDICARE

## 2022-10-17 VITALS
HEART RATE: 62 BPM | BODY MASS INDEX: 27.86 KG/M2 | DIASTOLIC BLOOD PRESSURE: 76 MMHG | OXYGEN SATURATION: 98 % | HEIGHT: 66 IN | SYSTOLIC BLOOD PRESSURE: 130 MMHG | TEMPERATURE: 98 F

## 2022-10-17 DIAGNOSIS — Z12.31 SCREENING MAMMOGRAM, ENCOUNTER FOR: Primary | ICD-10-CM

## 2022-10-17 DIAGNOSIS — R07.89 CHEST DISCOMFORT: ICD-10-CM

## 2022-10-17 DIAGNOSIS — R03.0 ELEVATED BLOOD PRESSURE READING: Primary | ICD-10-CM

## 2022-10-17 PROCEDURE — 99214 OFFICE O/P EST MOD 30 MIN: CPT | Mod: S$PBB,,, | Performed by: FAMILY MEDICINE

## 2022-10-17 PROCEDURE — 99214 PR OFFICE/OUTPT VISIT, EST, LEVL IV, 30-39 MIN: ICD-10-PCS | Mod: S$PBB,,, | Performed by: FAMILY MEDICINE

## 2022-10-17 PROCEDURE — 99999 PR PBB SHADOW E&M-EST. PATIENT-LVL IV: CPT | Mod: PBBFAC,,, | Performed by: FAMILY MEDICINE

## 2022-10-17 PROCEDURE — 99214 OFFICE O/P EST MOD 30 MIN: CPT | Mod: PBBFAC,PO | Performed by: FAMILY MEDICINE

## 2022-10-17 PROCEDURE — 99999 PR PBB SHADOW E&M-EST. PATIENT-LVL IV: ICD-10-PCS | Mod: PBBFAC,,, | Performed by: FAMILY MEDICINE

## 2022-10-17 NOTE — PROGRESS NOTES
(Portions of this note were dictated using voice recognition software and may contain dictation related errors in spelling/grammar/syntax not found on text review)    CC:   Chief Complaint   Patient presents with    Hypertension       HPI: 72 y.o. female Annual exam December 17, 2021.  Concerned about elevated blood pressure.  No documented hypertension history in the chart.  Last blood pressure reviewed on vitals flow sheet has been fairly unremarkable.  Message on 09/16 regarding blood pressure taken that day which was 141/80    Initial blood pressure on intake today was 142/66 but recheck at 130/70 bilateral.  She does mention being under quite a bit of stress recently.  Her brother had a massive stroke and she is back and forth visiting him at the hospital.  This happened about 2 weeks ago.  She does admit to poor diet since she is eating out a lot more and cooking less at home.  She is not sleeping as well.  Does feel some anxiety.  She is exercising regularly.  She was concerned because sometimes when she would lie down at the end of the day she would feel some chest heaviness.  No shortness of breath.  No abdominal pain.  No heartburn symptoms.  She does not have chest pain during the daytime and when exercising.  She does feel like the symptoms are probably more prominent when she is really tired.  She was concerned whether she needs any cardiac testing.    Past Medical History:   Diagnosis Date    Anxiety     Chronic constipation     DDD (degenerative disc disease), lumbar     Fibroid tumor     Glaucoma     Hyperplastic colon polyp     Osteoarthritis of left knee     Osteopenia     Pes planus        Past Surgical History:   Procedure Laterality Date    BREAST CYST EXCISION      Breast reduction  2004    COLONOSCOPY N/A 10/10/2018    Procedure: COLONOSCOPY;  Surgeon: Cristal Whiteside MD;  Location: [facility];  Service: Endoscopy;  Laterality: N/A;    PERCUTANEOUS CRYOTHERAPY OF PERIPHERAL NERVE USING  LIQUID NITROUS OXIDE IN CLOSED NEEDLE DEVICE Left 8/23/2018    Procedure: CRYOTHERAPY, NERVE, PERIPHERAL, PERCUTANEOUS, USING LIQUID NITROUS OXIDE IN CLOSED NEEDLE DEVICE;  Surgeon: FRANK Null;  Location: [facility] OR;  Service: Orthopedics;  Laterality: Left;    TOTAL KNEE ARTHROPLASTY Left     TOTAL REDUCTION MAMMOPLASTY      TUBAL LIGATION         Family History   Problem Relation Age of Onset    Breast cancer Maternal Aunt     Colon cancer Cousin     Diabetes Sister         x2    Breast cancer Sister     Diabetes Mother     No Known Problems Father     No Known Problems Brother     No Known Problems Maternal Uncle     No Known Problems Paternal Aunt     No Known Problems Paternal Uncle     No Known Problems Maternal Grandmother     No Known Problems Maternal Grandfather     No Known Problems Paternal Grandmother     No Known Problems Paternal Grandfather     Amblyopia Neg Hx     Blindness Neg Hx     Cancer Neg Hx     Cataracts Neg Hx     Glaucoma Neg Hx     Hypertension Neg Hx     Macular degeneration Neg Hx     Retinal detachment Neg Hx     Strabismus Neg Hx     Stroke Neg Hx     Thyroid disease Neg Hx     Melanoma Neg Hx        Social History     Tobacco Use    Smoking status: Never    Smokeless tobacco: Never   Substance Use Topics    Alcohol use: No     Alcohol/week: 0.0 standard drinks    Drug use: No       Lab Results   Component Value Date    WBC 5.95 12/13/2021    HGB 14.0 12/13/2021    HCT 42.0 12/13/2021    MCV 95 12/13/2021     12/13/2021    CHOL 197 12/13/2021    TRIG 69 12/13/2021    HDL 75 12/13/2021    ALT 24 12/13/2021    AST 21 12/13/2021    BILITOT 0.7 12/13/2021    ALKPHOS 94 12/13/2021     12/13/2021    K 4.5 12/13/2021     12/13/2021    CREATININE 0.8 12/13/2021    ESTGFRAFRICA >60 12/13/2021    EGFRNONAA >60 12/13/2021    CALCIUM 9.5 12/13/2021    ALBUMIN 3.9 12/13/2021    BUN 12 12/13/2021    CO2 29 12/13/2021    TSH 0.866 12/13/2021    INR 1.0 07/30/2018    HGBA1C  "5.3 12/12/2020    LDLCALC 108.2 12/13/2021    GLU 99 12/13/2021    [id number] 52 12/13/2021                 Vital signs reviewed  Vitals:    10/17/22 1311 10/17/22 1342   BP: (!) 142/66 130/76   Pulse: 62    Temp: 97.7 °F (36.5 °C)    SpO2: 98%    Weight: Comment: patient declined    Height: 5' 6" (1.676 m)    She declined a weight for today       Wt Readings from Last 4 Encounters:   12/17/21 78.3 kg (172 lb 9.9 oz)   12/09/21 77 kg (169 lb 12.1 oz)   12/18/20 76 kg (167 lb 8.8 oz)   02/21/20 83.5 kg (184 lb 1.4 oz)       PE:   APPEARANCE: Well nourished, well developed, in no acute distress.    HEAD: Normocephalic, atraumatic.  EYES:   Conjunctivae noninjected.  NECK: Supple with no cervical lymphadenopathy.    CHEST: Good inspiratory effort. Lungs clear to auscultation with no wheezes or crackles.  CARDIOVASCULAR: Normal S1, S2. No rubs, murmurs, or gallops.  ABDOMEN: Bowel sounds normal. Not distended. Soft. No tenderness or masses. No organomegaly.  EXTREMITIES: No edema      IMPRESSION  1. Elevated blood pressure reading    2. Chest discomfort            PLAN  No orders of the defined types were placed in this encounter.    Elevated blood pressure reading but improved on recheck.  Does admit to some stresses recently along with some poor sleep and also dietary indiscretions given situational factors above.  We did discuss dietary interventions to help reduce blood pressure.  Dash diet literature provided    Chest discomfort mainly at nighttime and mainly when very fatigued already from the day.  Denies any exertional pains.  Exercises regularly without any dyspnea or chest pain.  Denies any postprandial pain.  Denies any abdominal pain nausea vomiting or diaphoresis.  At this point her symptoms seem very atypical for cardiogenic phenomenon.  Her last EKG was from 2018 showing sinus bradycardia, early repolarization otherwise normal.  We did discuss some mindfulness exercises to help with stress, discussed " sleep hygiene as well.  At the current time I do not think she needs any additional cardiac testing although if symptoms recur, did discuss repeat EKG , and if worsening or exertional component considering stress testing..  She usually comes in December for her annual exam.  We did discuss it is reasonable to monitor symptoms and recheck blood pressures and improve on diet and sleep hygiene 1st and schedule follow-up around December for annual and can see if any of these other symptoms have been persistent.  She is satisfied with holding off until that time to get any repeat labs and we can decide if she needs any further cardiac testing at that time as well

## 2022-10-17 NOTE — PATIENT INSTRUCTIONS
"Lifestyle choices to lower blood pressure    Diet  DASH diet--high in fruits/vegetables and low in fat and saturated fat: 8-14 points  Salt restriction--2.3 grams sodium/day: 2-8 points    Weight loss--5-20 points per 20 lbs lost.    Exercise--30min most days/wk: 4-9 points    Limit Alcohol--2/day men; 1/day women: 2-4 points.        The DASH Diet: Healthy Eating to Control Your Blood Pressure     What is the DASH diet?    DASH stands for Dietary Approaches to Stop Hypertension. It is a balanced eating plan that your family doctor might recommend to help you lower your blood pressure. The DASH diet:  Is low in salt, saturated fat, cholesterol and total fat.   Emphasizes fruits, vegetables, and fat-free or low-fat milk and milk products.   Includes whole grains, fish, poultry and nuts.   Limits the amount of red meat, sweets, added sugars and sugar-containing beverages in your diet.   Is rich in potassium, magnesium and calcium, as well as protein and fiber.     How can the DASH diet help me stay healthy?  Getting too much sodium (salt) in your diet can contribute to high blood pressure (also called hypertension). Some salt is in foods naturally, and some salt is added to food when it is processed or prepared. Following the DASH diet can help you lower your blood pressure, or prevent high blood pressure, by reducing the amount of sodium in your diet to less than 2,300 mg per day.  The fruits, vegetables and whole grains recommended in the DASH diet provide many other elements of a healthy diet, such as lycopene, beta-carotene and isoflavones. These can help protect your body against common health conditions, such as cancer, osteoporosis, stroke and diabetes. Following the DASH diet can also help reduce your risk of heart disease by lowering your low-density lipoprotein (LDL, or "bad") cholesterol level.  Following the DASH diet may drop your blood pressure by a few points in as little as 2 weeks. However, you should " not stop taking your blood pressure medicine, or any other prescribed medicine, without talking to your doctor first.    What kinds of foods are included in the DASH diet?  The DASH diet is nutritionally balanced for good health. You dont need to buy any special foods or pills, or cook with any special recipes, to follow the DASH diet. If you follow the DASH diet, you will eat about 2,000 calories each day. These calories will come from a variety of foods.    Where is sodium in my diet?  Food Serving Sodium Content   ¼ teaspoon table salt 575 mg   ½ teaspoon table salt 1,150 mg   1 teaspoon table salt 2,300 mg   1 hot dog 460 mg   1 regular fast-food hamburger 600 mg   2 ounces processed cheese 600 mg   1 tablespoon soy sauce 900 mg   1 serving frozen pizza with meat and vegetables 982 mg   8 ounces regular potato chips 1,192 mg     The DASH diet  Whole grains (6 to 8 servings a day)   Vegetables (4 to 5 servings a day)   Fruits (4 to 5 servings a day)   Low-fat or fat-free milk and milk products (2 to 3 servings a day)   Lean meats, poultry and fish (6 or fewer servings a day)   Nuts, seeds and beans (4 to 5 servings a week)   Fats and oils (2 to 3 servings a day)   Sweets, preferably low-fat or fat-free (5 or fewer a week)   Sodium (no more than 2,300 mg a day)   You can adapt the DASH diet to meet your needs. For example:  If you drink alcohol, limit yourself to 2 drinks or less per day for men and 1 drink or less per day for women.   To reduce your blood pressure even more, replace some of the carbohydrates in the DASH diet with low-fat protein and unsaturated fats.   If you need to lose weight, reduce the number of calories you eat to about 1,600 per day.   Follow a lower-sodium version (no more than 1,500 mg daily) if you are 40 years of age or older, you are  or you already have high blood pressure.     How can I change my eating habits?  Dont be discouraged if following the DASH diet is  "difficult at first. Start with small, achievable goals. The following ideas can help you make healthy changes:  Pay attention to your current eating habits. Make a list of everything you eat for 2 or 3 days. Compare this list to the DASH diet recommendations above and see what changes you need to make in your diet.   Make one change at a time. For example, start by choosing lower-fat versions of your favorite foods or adding more whole grains to your meals.   Learn what makes a serving for each type of food. For example, 1 serving equals 1 slice of bread, 8 ounces of milk, 1 cup of raw vegetables or 1/2 cup of cooked vegetables. For more serving sizes, go to the [facility] site. Dont have a measuring cup? One serving (3 ounces) of meat or poultry is about the size of a deck of cards. One serving (1/2 cup) of rice or potato looks like half a baseball, and a serving of cheese is about the size of 4 stacked dice.   If eating more fruits and vegetables gives you gas, bloating or diarrhea, increase these foods slowly. You can also talk to your family doctor about taking over-the-counter medicines to reduce these symptoms until your body adjusts.   Get more exercise. Physical activity helps lower your blood pressure and can help you lose more weight.   Use salt-free seasonings, such as spices and herbs, to add flavor to your recipes and reduce or eliminate salt.   Include as many fresh and unprocessed foods as possible. Cut back on frozen dinners, packaged mixes, canned soups and bottled salad dressings, which are often high in sodium.   When buying canned, frozen or processed foods, check nutrition labels for the amount of sodium, sugar and saturated fat. Look for the phrases "no salt added," "sodium-free," "low sodium" or "very low sodium" on food packages. Choose foods with monounsaturated and polyunsaturated fats.   Steam, grill, poach, roast or stir-lundberg foods. Use low-sodium broth or water instead of butter or oil for " sautéing.   When you eat at a restaurant, ask how foods are prepared. Ask if your order can be made without added salt. Dont add salty condiments, such as ketchup, mustard, pickles or sauces, to your food.   Other Organizations  Centers for Disease Control and Prevention   National Heart, [employer], [employer] [place]   Written by [facility]org editorial staff  Reviewed/Updated: 02/11  Created: 08/09

## 2022-11-02 ENCOUNTER — TELEPHONE (OUTPATIENT)
Dept: FAMILY MEDICINE | Facility: CLINIC | Age: 72
End: 2022-11-02
Payer: MEDICARE

## 2022-11-02 DIAGNOSIS — Z13.220 ENCOUNTER FOR LIPID SCREENING FOR CARDIOVASCULAR DISEASE: ICD-10-CM

## 2022-11-02 DIAGNOSIS — Z00.00 ANNUAL PHYSICAL EXAM: Primary | ICD-10-CM

## 2022-11-02 DIAGNOSIS — E55.9 VITAMIN D INSUFFICIENCY: ICD-10-CM

## 2022-11-02 DIAGNOSIS — R73.03 PREDIABETES: ICD-10-CM

## 2022-11-02 DIAGNOSIS — M85.80 OSTEOPENIA, UNSPECIFIED LOCATION: ICD-10-CM

## 2022-11-02 DIAGNOSIS — Z13.6 ENCOUNTER FOR LIPID SCREENING FOR CARDIOVASCULAR DISEASE: ICD-10-CM

## 2022-11-02 NOTE — TELEPHONE ENCOUNTER
----- Message from Kimberly Diego sent at 11/1/2022  2:10 PM CDT -----  Type:  Needs Medical Advice    Who Called: pt  Symptoms (please be specific): pt is scheduled for a annual on 12/19/22 and the pt is requesting to have her lab orders and the pt is requesting to have a bone density test done also    Would the patient rather a call back or a response via [facility]? call  Best Call Back Number: [phone]  Additional Information:

## 2022-11-02 NOTE — TELEPHONE ENCOUNTER
Patient requesting labs for her upcoming annual in December.     Patient was notified her DEXA not due until   DEC 29   2023 DEXA Scan (Every 3 Years)  Last completed: Dec 29, 2020   So no dexa needed

## 2022-11-14 ENCOUNTER — OFFICE VISIT (OUTPATIENT)
Dept: OBSTETRICS AND GYNECOLOGY | Facility: CLINIC | Age: 72
End: 2022-11-14
Payer: MEDICARE

## 2022-11-14 VITALS — DIASTOLIC BLOOD PRESSURE: 78 MMHG | SYSTOLIC BLOOD PRESSURE: 134 MMHG

## 2022-11-14 DIAGNOSIS — Z12.39 ENCOUNTER FOR SCREENING FOR MALIGNANT NEOPLASM OF BREAST, UNSPECIFIED SCREENING MODALITY: ICD-10-CM

## 2022-11-14 DIAGNOSIS — Z01.419 ROUTINE GYNECOLOGICAL EXAMINATION: Primary | ICD-10-CM

## 2022-11-14 PROCEDURE — 99213 OFFICE O/P EST LOW 20 MIN: CPT | Mod: PBBFAC,PO | Performed by: OBSTETRICS & GYNECOLOGY

## 2022-11-14 PROCEDURE — 99999 PR PBB SHADOW E&M-EST. PATIENT-LVL III: ICD-10-PCS | Mod: PBBFAC,,, | Performed by: OBSTETRICS & GYNECOLOGY

## 2022-11-14 PROCEDURE — 99999 PR PBB SHADOW E&M-EST. PATIENT-LVL III: CPT | Mod: PBBFAC,,, | Performed by: OBSTETRICS & GYNECOLOGY

## 2022-11-14 PROCEDURE — G0101 PR CA SCREEN;PELVIC/BREAST EXAM: ICD-10-PCS | Mod: S$PBB,GZ,, | Performed by: OBSTETRICS & GYNECOLOGY

## 2022-11-14 PROCEDURE — G0101 CA SCREEN;PELVIC/BREAST EXAM: HCPCS | Mod: S$PBB,GZ,, | Performed by: OBSTETRICS & GYNECOLOGY

## 2022-11-17 NOTE — PROGRESS NOTES
Huber Pena is a 69 y.o.  Here today for routine gynecological examination  no complaints today   No postmenopausal bleeding      Last visit to gyn 1years ago   Dexa scan normal in    Last mammogram  2020 normal         ROS  GENERAL: Denies significant weight gain or weight loss. Feeling well overall.  SKIN: Denies rash or lesions.  Normal skin turgor  HEAD: Denies head injury or headache.   NODES: Denies enlarged lymph nodes.   CHEST: Denies chest pain or shortness of breath.   CARDIOVASCULAR: Denies palpitations or left sided chest pain.   ABDOMEN: No abdominal pain,no  diarrhea,constipation  nausea, vomiting or rectal bleeding.   URINARY: normal  Frequency,no  Dysuria or burning on urination.   REPRODUCTIVE: No abnormal bleeding.No vaginal discharge.   BREASTS: The patient sometimes performs breast self-examination and denies pain, lumps, or nipple discharge.   HEMATOLOGIC: No easy bruisability or excessive bleeding.   MUSCULOSKELETAL: Denies joint pain or swelling.   NEUROLOGIC: Denies syncope or weakness.   PSYCHIATRIC: Denies depression, anxiety or mood swings.     Physical Exam   Constitutional: She is oriented to person, place, and time. She appears well-developed and well-nourished. No distress.   HENT:   Head: Normocephalic.   NECK: Neck symmetric without masses or thyromegaly.  Cardiovascular: Normal rate and regular rhythm.   Pulmonary/Chest: Effort normal and breath sounds normal. No respiratory distress. She has no wheezes.   Breasts: Symmetrical, no skin changes or visible lesions. No palpable masses, nipple discharge or adenopathy bilaterally.  Abdominal: Soft not distended. Bowel sounds are normal. She exhibits   no masses . No tenderness to palpation.   Genitourinary: Pelvic exam was performed with patient supine.   External genitalia normal no lesions.Normal hair distribution . Erythema on bilateral groin area, yeast infection   Adequate perineal body,normal urethral  meatus   Vagina moist and well rugated without lesions, no vaginal  Discharge.   Cervix pink and without lesions. No bleeding. No significant cystocele or rectocele.  Bimanual exam difficult due to body habitus    Urethra and bladder normal  Extremities normal no cyanosis ,edema.      Procedures:   mammogram      A/P Huber Pena 69 y.o.      Dx=  1-Routine gynecological examination   2- Post Menopause      Plan:  Routine gyn   -s/p normal breast exam   -s/p normal pelvic exam:     Patient was counseled today on A.C.S. Pap guidelines and recommendations for yearly pelvic exams, mammograms and monthly self breast exams; to see her PCP for other health maintenance, nutrition and weight gain counseling, discussed lab values.  Discussed colonoscopy recommendations every 10 years starting at age 50   Calcium and vit D daily intake      F/u in 1 yr or RUBIO Sparks M.D.

## 2022-11-19 ENCOUNTER — PATIENT MESSAGE (OUTPATIENT)
Dept: FAMILY MEDICINE | Facility: CLINIC | Age: 72
End: 2022-11-19
Payer: MEDICARE

## 2022-11-28 NOTE — PROGRESS NOTES
HPI     Glaucoma            Comments: HVF review today and pt states no changes since last exam           Comments    DLS: 8/9/22    1. POAG  2. NS OU  3. Hyperopia    MEDS:  Cosopt BID OU          Last edited by Katy Carr MA on 11/29/2022 10:39 AM.            Assessment /Plan     For exam results, see Encounter Report.    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 soft CI)        LASERS    none        GLAUCOMA SURGERIES    none        OTHER EYE SURGERIES    none        CDR    07/0.8        Tbase    >20 (24)  (per pt)         Tmax    > 20  (21)   (per pt)         Ttarget    undetermined          HVF    2 test 2022 to 2022 - not reliable, paracentral/central defects od // inferior and ST defects os        Gonio    Next visit        CCT    583/579        OCT    1 test 2022 to 2022 - RNFL - full od // borderline thinning temp os        Disc photos    Pending     - Ttoday    17/18  - Test done today   -IOP // repeat HVF // gonio // CCT     2. Cataracts   - may be VS    - evaluate after next visit    3. Hyperopia / presbyopia       Try and get records from Dr. Donta Pennington cosopt ou bid     RTC in 4 months IOP

## 2022-11-29 ENCOUNTER — CLINICAL SUPPORT (OUTPATIENT)
Dept: OPHTHALMOLOGY | Facility: CLINIC | Age: 72
End: 2022-11-29
Payer: MEDICARE

## 2022-11-29 ENCOUNTER — OFFICE VISIT (OUTPATIENT)
Dept: OPHTHALMOLOGY | Facility: CLINIC | Age: 72
End: 2022-11-29
Payer: MEDICARE

## 2022-11-29 DIAGNOSIS — H40.023 OPEN ANGLE WITH BORDERLINE FINDINGS AND HIGH GLAUCOMA RISK IN BOTH EYES: ICD-10-CM

## 2022-11-29 DIAGNOSIS — H52.4 HYPEROPIA WITH PRESBYOPIA OF BOTH EYES: ICD-10-CM

## 2022-11-29 DIAGNOSIS — H52.03 HYPEROPIA WITH PRESBYOPIA OF BOTH EYES: ICD-10-CM

## 2022-11-29 DIAGNOSIS — H25.13 NUCLEAR SCLEROTIC CATARACT OF BOTH EYES: ICD-10-CM

## 2022-11-29 DIAGNOSIS — H40.023 OPEN ANGLE WITH BORDERLINE FINDINGS AND HIGH GLAUCOMA RISK IN BOTH EYES: Primary | ICD-10-CM

## 2022-11-29 PROCEDURE — 92012 INTRM OPH EXAM EST PATIENT: CPT | Mod: S$PBB,,, | Performed by: OPHTHALMOLOGY

## 2022-11-29 PROCEDURE — 92020 GONIOSCOPY: CPT | Mod: S$PBB,,, | Performed by: OPHTHALMOLOGY

## 2022-11-29 PROCEDURE — 92012 PR EYE EXAM, EST PATIENT,INTERMED: ICD-10-PCS | Mod: S$PBB,,, | Performed by: OPHTHALMOLOGY

## 2022-11-29 PROCEDURE — 92020 GONIOSCOPY: CPT | Mod: PBBFAC,PO | Performed by: OPHTHALMOLOGY

## 2022-11-29 PROCEDURE — 99999 PR PBB SHADOW E&M-EST. PATIENT-LVL II: ICD-10-PCS | Mod: PBBFAC,,, | Performed by: OPHTHALMOLOGY

## 2022-11-29 PROCEDURE — 99999 PR PBB SHADOW E&M-EST. PATIENT-LVL II: CPT | Mod: PBBFAC,,, | Performed by: OPHTHALMOLOGY

## 2022-11-29 PROCEDURE — 92020 PR SPECIAL EYE EVAL,GONIOSCOPY: ICD-10-PCS | Mod: S$PBB,,, | Performed by: OPHTHALMOLOGY

## 2022-11-29 PROCEDURE — 99212 OFFICE O/P EST SF 10 MIN: CPT | Mod: PBBFAC,PO | Performed by: OPHTHALMOLOGY

## 2022-11-29 RX ORDER — NYSTATIN 100000 [USP'U]/G
POWDER TOPICAL
COMMUNITY
End: 2023-12-12 | Stop reason: SDUPTHER

## 2022-11-29 NOTE — PROGRESS NOTES
24-2  SS DONE OU     REL & FIX =   FAIR OU       COOP =     FAIR       NO ALLERGIES TO LATEX OR ADHESIVES AT THIS TIME    JTHOMAS    MRX      + .75     OD     +.75 + .50 X 75     OS

## 2022-12-01 ENCOUNTER — TELEPHONE (OUTPATIENT)
Dept: FAMILY MEDICINE | Facility: CLINIC | Age: 72
End: 2022-12-01
Payer: MEDICARE

## 2022-12-01 NOTE — TELEPHONE ENCOUNTER
Called pt regarding her message. Pt stated she will be out of town on December 19th and needed to reschedule her appointment. I was able to reschedule her appointment and labs for her upcoming appointment

## 2022-12-01 NOTE — TELEPHONE ENCOUNTER
----- Message from Amita Hoyt sent at 12/1/2022  9:54 AM CST -----  Regarding: call back  Contact: [phone]  Who Called: PT     Patient is calling to talk to nurse in regards to her appointment on 12/19/22 for her Annual. Please advice

## 2022-12-06 ENCOUNTER — LAB VISIT (OUTPATIENT)
Dept: LAB | Facility: HOSPITAL | Age: 72
End: 2022-12-06
Attending: FAMILY MEDICINE
Payer: MEDICARE

## 2022-12-06 DIAGNOSIS — R73.03 PREDIABETES: ICD-10-CM

## 2022-12-06 DIAGNOSIS — M85.80 OSTEOPENIA, UNSPECIFIED LOCATION: ICD-10-CM

## 2022-12-06 DIAGNOSIS — Z00.00 ANNUAL PHYSICAL EXAM: ICD-10-CM

## 2022-12-06 DIAGNOSIS — E55.9 VITAMIN D INSUFFICIENCY: ICD-10-CM

## 2022-12-06 LAB
25(OH)D3+25(OH)D2 SERPL-MCNC: 60 NG/ML (ref 30–96)
ALBUMIN SERPL BCP-MCNC: 3.7 G/DL (ref 3.5–5.2)
ALP SERPL-CCNC: 80 U/L (ref 55–135)
ALT SERPL W/O P-5'-P-CCNC: 17 U/L (ref 10–44)
ANION GAP SERPL CALC-SCNC: 9 MMOL/L (ref 8–16)
AST SERPL-CCNC: 19 U/L (ref 10–40)
BASOPHILS # BLD AUTO: 0.05 K/UL (ref 0–0.2)
BASOPHILS NFR BLD: 0.9 % (ref 0–1.9)
BILIRUB SERPL-MCNC: 0.7 MG/DL (ref 0.1–1)
BUN SERPL-MCNC: 12 MG/DL (ref 8–23)
CALCIUM SERPL-MCNC: 9.2 MG/DL (ref 8.7–10.5)
CHLORIDE SERPL-SCNC: 108 MMOL/L (ref 95–110)
CO2 SERPL-SCNC: 23 MMOL/L (ref 23–29)
CREAT SERPL-MCNC: 0.8 MG/DL (ref 0.5–1.4)
DIFFERENTIAL METHOD: ABNORMAL
EOSINOPHIL # BLD AUTO: 0.1 K/UL (ref 0–0.5)
EOSINOPHIL NFR BLD: 2.5 % (ref 0–8)
ERYTHROCYTE [DISTWIDTH] IN BLOOD BY AUTOMATED COUNT: 14.2 % (ref 11.5–14.5)
EST. GFR  (NO RACE VARIABLE): >60 ML/MIN/1.73 M^2
GLUCOSE SERPL-MCNC: 91 MG/DL (ref 70–110)
HCT VFR BLD AUTO: 40.2 % (ref 37–48.5)
HGB BLD-MCNC: 13.2 G/DL (ref 12–16)
IMM GRANULOCYTES # BLD AUTO: 0.01 K/UL (ref 0–0.04)
IMM GRANULOCYTES NFR BLD AUTO: 0.2 % (ref 0–0.5)
LYMPHOCYTES # BLD AUTO: 1.4 K/UL (ref 1–4.8)
LYMPHOCYTES NFR BLD: 26 % (ref 18–48)
MCH RBC QN AUTO: 31.1 PG (ref 27–31)
MCHC RBC AUTO-ENTMCNC: 32.8 G/DL (ref 32–36)
MCV RBC AUTO: 95 FL (ref 82–98)
MONOCYTES # BLD AUTO: 0.5 K/UL (ref 0.3–1)
MONOCYTES NFR BLD: 9 % (ref 4–15)
NEUTROPHILS # BLD AUTO: 3.4 K/UL (ref 1.8–7.7)
NEUTROPHILS NFR BLD: 61.4 % (ref 38–73)
NRBC BLD-RTO: 0 /100 WBC
PLATELET # BLD AUTO: 198 K/UL (ref 150–450)
PMV BLD AUTO: 11.1 FL (ref 9.2–12.9)
POTASSIUM SERPL-SCNC: 4.3 MMOL/L (ref 3.5–5.1)
PROT SERPL-MCNC: 7.1 G/DL (ref 6–8.4)
RBC # BLD AUTO: 4.25 M/UL (ref 4–5.4)
SODIUM SERPL-SCNC: 140 MMOL/L (ref 136–145)
TSH SERPL DL<=0.005 MIU/L-ACNC: 0.88 UIU/ML (ref 0.4–4)
WBC # BLD AUTO: 5.54 K/UL (ref 3.9–12.7)

## 2022-12-06 PROCEDURE — 84443 ASSAY THYROID STIM HORMONE: CPT | Performed by: FAMILY MEDICINE

## 2022-12-06 PROCEDURE — 80053 COMPREHEN METABOLIC PANEL: CPT | Performed by: FAMILY MEDICINE

## 2022-12-06 PROCEDURE — 85025 COMPLETE CBC W/AUTO DIFF WBC: CPT | Performed by: FAMILY MEDICINE

## 2022-12-06 PROCEDURE — 82306 VITAMIN D 25 HYDROXY: CPT | Performed by: FAMILY MEDICINE

## 2022-12-06 PROCEDURE — 36415 COLL VENOUS BLD VENIPUNCTURE: CPT | Performed by: FAMILY MEDICINE

## 2022-12-07 ENCOUNTER — PATIENT MESSAGE (OUTPATIENT)
Dept: FAMILY MEDICINE | Facility: CLINIC | Age: 72
End: 2022-12-07
Payer: MEDICARE

## 2022-12-09 ENCOUNTER — TELEPHONE (OUTPATIENT)
Dept: OBSTETRICS AND GYNECOLOGY | Facility: CLINIC | Age: 72
End: 2022-12-09
Payer: MEDICARE

## 2022-12-09 ENCOUNTER — LAB VISIT (OUTPATIENT)
Dept: LAB | Facility: HOSPITAL | Age: 72
End: 2022-12-09
Attending: FAMILY MEDICINE
Payer: MEDICARE

## 2022-12-09 ENCOUNTER — OFFICE VISIT (OUTPATIENT)
Dept: FAMILY MEDICINE | Facility: CLINIC | Age: 72
End: 2022-12-09
Payer: MEDICARE

## 2022-12-09 VITALS
HEIGHT: 66 IN | OXYGEN SATURATION: 99 % | WEIGHT: 175.5 LBS | SYSTOLIC BLOOD PRESSURE: 124 MMHG | DIASTOLIC BLOOD PRESSURE: 70 MMHG | HEART RATE: 70 BPM | BODY MASS INDEX: 28.21 KG/M2 | TEMPERATURE: 98 F

## 2022-12-09 DIAGNOSIS — R73.09 ELEVATED HEMOGLOBIN A1C: ICD-10-CM

## 2022-12-09 DIAGNOSIS — J06.9 UPPER RESPIRATORY TRACT INFECTION, UNSPECIFIED TYPE: ICD-10-CM

## 2022-12-09 DIAGNOSIS — Z00.00 ANNUAL PHYSICAL EXAM: Primary | ICD-10-CM

## 2022-12-09 LAB
ESTIMATED AVG GLUCOSE: 111 MG/DL (ref 68–131)
HBA1C MFR BLD: 5.5 % (ref 4–5.6)

## 2022-12-09 PROCEDURE — 36415 COLL VENOUS BLD VENIPUNCTURE: CPT | Performed by: FAMILY MEDICINE

## 2022-12-09 PROCEDURE — 99999 PR PBB SHADOW E&M-EST. PATIENT-LVL III: CPT | Mod: PBBFAC,,, | Performed by: FAMILY MEDICINE

## 2022-12-09 PROCEDURE — 99397 PR PREVENTIVE VISIT,EST,65 & OVER: ICD-10-PCS | Mod: S$PBB,GZ,, | Performed by: FAMILY MEDICINE

## 2022-12-09 PROCEDURE — 99397 PER PM REEVAL EST PAT 65+ YR: CPT | Mod: S$PBB,GZ,, | Performed by: FAMILY MEDICINE

## 2022-12-09 PROCEDURE — 99213 OFFICE O/P EST LOW 20 MIN: CPT | Mod: PBBFAC,PO | Performed by: FAMILY MEDICINE

## 2022-12-09 PROCEDURE — 83036 HEMOGLOBIN GLYCOSYLATED A1C: CPT | Performed by: FAMILY MEDICINE

## 2022-12-09 PROCEDURE — 99999 PR PBB SHADOW E&M-EST. PATIENT-LVL III: ICD-10-PCS | Mod: PBBFAC,,, | Performed by: FAMILY MEDICINE

## 2022-12-09 NOTE — TELEPHONE ENCOUNTER
Pt came into clinic requesting a rx cream or powder for vaginal itching/ yeast.To [facility] on [address] in [place]. Please advise

## 2022-12-09 NOTE — PROGRESS NOTES
HPI: 72 y.o. female  Annual exam 12/2019    Anxiety, on occasional alprazolam, rarely uses.     Left knee osteoarthritis, status post TKA in August 2018.  Overall doing well but does occasionally use meloxicam.    Osteopenia, DEXA was normal from 2018    Mildly high a1c 5.7 in the past but improved on recheck.  Had recent labs drawn as below, normal glucose.  A1c was not with current labs but she would like to get an A1c done anyway.    Exercises regularly, tries to eat healthy diet but does feel like holidays have been difficult    presented October 2022 with concerns about elevated blood pressure, did report a lot of stress going on with brother having a stroke and she was visiting him in the hospital, going back and forth and eating out more in cooking less at home so admitted to some poor diet, also poor sleep and anxiety.  Notice some chest heaviness at the end of the day when she would lie down, not noted with daytime and with exercising.  Her blood pressure was improved on recheck in the office, discussed impact of stress and dietary changes and discussed continued monitoring, dietary intervention, continue regular exercise.  We discussed likely no need for cardiac testing given atypical presentation for cardiogenic chest pain.  Seem to happen more when she was fatigued at the end the day, nonexertional.  We discussed with stress management and proper sleep hygiene if the symptoms did not improve in worsens, could consider further testing if needed    About 5-6 days ago started with some nasal congestion and coughing.  No sore throat.  No fevers or chills.  No headache.  No shortness of breath.  Overall symptoms are getting better with Mucinex.  However, is planning on traveling in the next week just wanted to make sure no other treatment is needed.    Past Medical History:   Diagnosis Date    Anxiety     Cataract     Chronic constipation     DDD (degenerative disc disease), lumbar     Fibroid tumor      Glaucoma     Hyperplastic colon polyp     Osteoarthritis of left knee     Osteopenia     Pes planus        Past Surgical History:   Procedure Laterality Date    BREAST CYST EXCISION      Breast reduction  2004    COLONOSCOPY N/A 10/10/2018    Procedure: COLONOSCOPY;  Surgeon: Cristal Whiteside MD;  Location: [facility] ENDO;  Service: Endoscopy;  Laterality: N/A;    PERCUTANEOUS CRYOTHERAPY OF PERIPHERAL NERVE USING LIQUID NITROUS OXIDE IN CLOSED NEEDLE DEVICE Left 8/23/2018    Procedure: CRYOTHERAPY, NERVE, PERIPHERAL, PERCUTANEOUS, USING LIQUID NITROUS OXIDE IN CLOSED NEEDLE DEVICE;  Surgeon: FRANK Null;  Location: [facility] OR;  Service: Orthopedics;  Laterality: Left;    TOTAL KNEE ARTHROPLASTY Left     TOTAL REDUCTION MAMMOPLASTY      TUBAL LIGATION         Family History   Problem Relation Age of Onset    Breast cancer Maternal Aunt     Colon cancer Cousin     Diabetes Sister         x2    Breast cancer Sister     Diabetes Mother     No Known Problems Father     No Known Problems Brother     No Known Problems Maternal Uncle     No Known Problems Paternal Aunt     No Known Problems Paternal Uncle     No Known Problems Maternal Grandmother     No Known Problems Maternal Grandfather     No Known Problems Paternal Grandmother     No Known Problems Paternal Grandfather     Amblyopia Neg Hx     Blindness Neg Hx     Cancer Neg Hx     Cataracts Neg Hx     Glaucoma Neg Hx     Hypertension Neg Hx     Macular degeneration Neg Hx     Retinal detachment Neg Hx     Strabismus Neg Hx     Stroke Neg Hx     Thyroid disease Neg Hx     Melanoma Neg Hx        Social History     Tobacco Use    Smoking status: Never    Smokeless tobacco: Never   Substance Use Topics    Alcohol use: No     Alcohol/week: 0.0 standard drinks    Drug use: No     Lab Results   Component Value Date    WBC 5.54 12/06/2022    HGB 13.2 12/06/2022    HCT 40.2 12/06/2022    MCV 95 12/06/2022     12/06/2022    CHOL 197 12/13/2021    TRIG 69 12/13/2021     HDL 75 12/13/2021    ALT 17 12/06/2022    AST 19 12/06/2022    BILITOT 0.7 12/06/2022    ALKPHOS 80 12/06/2022     12/06/2022    K 4.3 12/06/2022     12/06/2022    CREATININE 0.8 12/06/2022    ESTGFRAFRICA >60 12/13/2021    EGFRNONAA >60 12/13/2021    CALCIUM 9.2 12/06/2022    ALBUMIN 3.7 12/06/2022    BUN 12 12/06/2022    CO2 23 12/06/2022    TSH 0.875 12/06/2022    INR 1.0 07/30/2018    HGBA1C 5.3 12/12/2020    LDLCALC 108.2 12/13/2021    GLU 91 12/06/2022    XEWTVXOG22BG 60 12/06/2022                Hemoglobin A1C (%)   Date Value   12/12/2020 5.3   03/17/2020 5.7 (H)   07/30/2018 5.4   07/26/2013 5.8   07/10/2008 5.2     Vital signs reviewed  PE:   APPEARANCE: Well nourished, well developed, in no acute distress.    HEAD: Normocephalic, atraumatic.  EYES: PERRL. EOMI.   Conjunctivae noninjected.  NOSE: Nasal turbinate edema bilaterally   THROAT: Clear  EARS:  Left TM clear.  Cerumen impaction on right  NECK: Supple with no cervical lymphadenopathy.  No carotid bruits.  No thyromegaly.    CHEST: Good inspiratory effort. Lungs clear to auscultation with no wheezes or crackles.  CARDIOVASCULAR: Normal S1, S2. No rubs, murmurs, or gallops.  ABDOMEN: Bowel sounds normal. Not distended. Soft. No tenderness or masses. No organomegaly.  EXTREMITIES: No edema            Impression  1. Annual physical exam    2. Elevated hemoglobin A1c    3. Upper respiratory tract infection, unspecified type              Plan  Reviewed labs, stable.  She would like an A1c done given prior elevation.  Will order this for this afternoon     continue dietary precautions and regular exercise     Osteoarthritis stable.  Mobic p.rsilvestre SANCHEZ, symptomatic management.  Okay to continue Mucinex.  Can try Neti pot rinses with distilled water.  Can consider Claritin D given normal blood pressure today.  No signs of acute bacterial rhinosinusitis or pneumonia needing antibiotic treatment.    Ear cerumen, does use Debrox periodically.   May use consistently for the next couple of weeks to see if that will help with disimpaction at home.      SCREENINGS     Immunizations:  tdap 2015  Flu:   up-to-date in September  Pneumococcal vaccination:  [id number]  2018.  PVX 2019  COVID (Pfizer) 01/04/2021, 01/26/2021, 09/27/2021  Zoster 10/3/13, can get Shingrix at pharmacy     Age/Gender Appropriate screenings:  mmg 12/13/2021, repeat scheduled  dexa 12/29/2020, normal  Colonoscopy  10/10/2018.  Normal.  Repeat 5 years  Pap smear 12/05/2019, Dr. Sparks

## 2022-12-15 ENCOUNTER — PES CALL (OUTPATIENT)
Dept: ADMINISTRATIVE | Facility: OTHER | Age: 72
End: 2022-12-15
Payer: MEDICARE

## 2022-12-15 ENCOUNTER — HOSPITAL ENCOUNTER (OUTPATIENT)
Dept: RADIOLOGY | Facility: HOSPITAL | Age: 72
Discharge: HOME | End: 2022-12-15
Attending: FAMILY MEDICINE
Payer: MEDICARE

## 2022-12-15 DIAGNOSIS — Z12.31 SCREENING MAMMOGRAM, ENCOUNTER FOR: ICD-10-CM

## 2022-12-15 PROCEDURE — 77067 MAMMO DIGITAL SCREENING BILAT WITH TOMO: ICD-10-PCS | Mod: 26,,, | Performed by: RADIOLOGY

## 2022-12-15 PROCEDURE — 77067 SCR MAMMO BI INCL CAD: CPT | Mod: TC

## 2022-12-15 PROCEDURE — 77063 MAMMO DIGITAL SCREENING BILAT WITH TOMO: ICD-10-PCS | Mod: 26,,, | Performed by: RADIOLOGY

## 2022-12-15 PROCEDURE — 77067 SCR MAMMO BI INCL CAD: CPT | Mod: 26,,, | Performed by: RADIOLOGY

## 2022-12-15 PROCEDURE — 77063 BREAST TOMOSYNTHESIS BI: CPT | Mod: TC

## 2022-12-15 PROCEDURE — 77063 BREAST TOMOSYNTHESIS BI: CPT | Mod: 26,,, | Performed by: RADIOLOGY

## 2022-12-27 ENCOUNTER — TELEPHONE (OUTPATIENT)
Dept: ADMINISTRATIVE | Facility: CLINIC | Age: 72
End: 2022-12-27
Payer: MEDICARE

## 2022-12-27 ENCOUNTER — PATIENT MESSAGE (OUTPATIENT)
Dept: ADMINISTRATIVE | Facility: CLINIC | Age: 72
End: 2022-12-27
Payer: MEDICARE

## 2022-12-28 ENCOUNTER — PATIENT MESSAGE (OUTPATIENT)
Dept: ADMINISTRATIVE | Facility: CLINIC | Age: 72
End: 2022-12-28
Payer: MEDICARE

## 2022-12-28 ENCOUNTER — OFFICE VISIT (OUTPATIENT)
Dept: FAMILY MEDICINE | Facility: CLINIC | Age: 72
End: 2022-12-28
Payer: MEDICARE

## 2022-12-28 ENCOUNTER — TELEPHONE (OUTPATIENT)
Dept: ADMINISTRATIVE | Facility: CLINIC | Age: 72
End: 2022-12-28
Payer: MEDICARE

## 2022-12-28 VITALS — WEIGHT: 175 LBS | HEIGHT: 66 IN | BODY MASS INDEX: 28.12 KG/M2

## 2022-12-28 DIAGNOSIS — Z00.00 ENCOUNTER FOR PREVENTIVE HEALTH EXAMINATION: Primary | ICD-10-CM

## 2022-12-28 DIAGNOSIS — I70.0 AORTIC ATHEROSCLEROSIS: ICD-10-CM

## 2022-12-28 DIAGNOSIS — K59.09 CHRONIC CONSTIPATION: ICD-10-CM

## 2022-12-28 DIAGNOSIS — M17.10 ARTHRITIS OF KNEE: ICD-10-CM

## 2022-12-28 DIAGNOSIS — F41.9 ANXIETY: ICD-10-CM

## 2022-12-28 DIAGNOSIS — E55.9 VITAMIN D INSUFFICIENCY: ICD-10-CM

## 2022-12-28 DIAGNOSIS — M51.37 DISC DISEASE, DEGENERATIVE, LUMBAR OR LUMBOSACRAL: ICD-10-CM

## 2022-12-28 DIAGNOSIS — R26.9 ABNORMALITY OF GAIT AND MOBILITY: ICD-10-CM

## 2022-12-28 DIAGNOSIS — M85.80 OSTEOPENIA, UNSPECIFIED LOCATION: ICD-10-CM

## 2022-12-28 DIAGNOSIS — E66.3 OVERWEIGHT WITH BODY MASS INDEX (BMI) OF 28 TO 28.9 IN ADULT: ICD-10-CM

## 2022-12-28 DIAGNOSIS — N60.12 FIBROCYSTIC DISEASE OF LEFT BREAST: ICD-10-CM

## 2022-12-28 PROCEDURE — G0439 PPPS, SUBSEQ VISIT: HCPCS | Mod: 95,,,

## 2022-12-28 PROCEDURE — G0439 PR MEDICARE ANNUAL WELLNESS SUBSEQUENT VISIT: ICD-10-PCS | Mod: 95,,,

## 2022-12-28 NOTE — TELEPHONE ENCOUNTER
Called pt; informed pt I was just making a reminder call for her virtual visit today at 1:00pm and to see if she needed any help; pt stated she didn't need any help and would complete e-pre check later; pt informed to login 15 minutes prior to appt time; message sent through portal

## 2022-12-28 NOTE — PROGRESS NOTES
"  The patient location is: [place]  The chief complaint leading to consultation is: Medicare AWV     Visit type: audiovisual    Face to Face time with patient: 40  60 minutes of total time spent on the encounter, which includes face to face time and non-face to face time preparing to see the patient (eg, review of tests), Obtaining and/or reviewing separately obtained history, Documenting clinical information in the electronic or other health record, Independently interpreting results (not separately reported) and communicating results to the patient/family/caregiver, or Care coordination (not separately reported).         Each patient to whom he or she provides medical services by telemedicine is:  (1) informed of the relationship between the physician and patient and the respective role of any other health care provider with respect to management of the patient; and (2) notified that he or she may decline to receive medical services by telemedicine and may withdraw from such care at any time.    Notes:       Huber Pena presented for a  Medicare AWV and comprehensive Health Risk Assessment today. The following components were reviewed and updated:    Medical history  Family History  Social history  Allergies and Current Medications  Health Risk Assessment  Health Maintenance  Care Team         ** See Completed Assessments for Annual Wellness Visit within the encounter summary.**         The following assessments were completed:  Living Situation  CAGE  Depression Screening  Fall Risk Assessment (MACH 10)  Hearing Assessment(HHI)  Cognitive Function Screening  Nutrition Screening  ADL Screening  PAQ Screening      Vitals:    12/28/22 1313   Weight: 79.4 kg (175 lb)   Height: 5' 6" (1.676 m)     Body mass index is 28.25 kg/m².  Physical Exam  Constitutional:       General: She is not in acute distress.     Appearance: Normal appearance. She is well-developed and well-groomed.   Skin:     Coloration: Skin is not " pale.   Neurological:      Mental Status: She is alert and oriented to person, place, and time.   Psychiatric:         Behavior: Behavior is cooperative.             Diagnoses and health risks identified today and associated recommendations/orders:    1. Encounter for preventive health examination    2. Aortic atherosclerosis  Chronic. Stable. Followed by PCP.     3. Disc disease, degenerative, lumbar or lumbosacral  Chronic; stable on medication. Followed by PCP.     4. Anxiety  Chronic; stable on medication. Followed by PCP.     5. Fibrocystic disease of left breast  Chronic. Stable. Followed by GYN.     6. Vitamin D insufficiency  Chronic; stable. Followed by PCP.     7. Chronic constipation  Chronic; stable. Followed by PCP.     8. Osteopenia, unspecified location  Chronic; stable. Followed by PCP.     9. Arthritis of knee  10. Abnormality of gait and mobility  Chronic. Stable with medication. Declines PT referral at this time.     11. Overweight with body mass index (BMI) of 28 to 28.9 in adult  Continue to work on diet modification and exercise as tolerated.       Review for Opioid Screening: Patient does not have rx for Opioids.    Review for Substance Use Disorders: Patient does not use substance.      Provided Leri with a 5-10 year written screening schedule and personal prevention plan. Recommendations were developed using the USPSTF age appropriate recommendations. Education, counseling, and referrals were provided as needed. After Visit Summary printed and given to patient which includes a list of additional screenings\tests needed.    Follow up in about 1 year (around 12/28/2023) for your next annual wellness visit.    Aurea Johnston NP      Advance Care Planning     I offered to discuss advanced care planning, including how to pick a person who would make decisions for you if you were unable to make them for yourself, called a health care power of , and what kind of decisions you might make  such as use of life sustaining treatments such as ventilators and tube feeding when faced with a life limiting illness recorded on a living will that they will need to know. (How you want to be cared for as you near the end of your natural life)     X Patient is interested in learning more about how to make advanced directives.  I provided them paperwork and offered to discuss this with them.

## 2022-12-28 NOTE — PATIENT INSTRUCTIONS
Counseling and Referral of Other Preventative  (Italic type indicates deductible and co-insurance are waived)    Patient Name: Huber Pena  Today's Date: 12/28/2022    Health Maintenance       Date Due Completion Date    Colorectal Cancer Screening 10/10/2023 10/10/2018    Hemoglobin A1c (Prediabetes) 12/09/2023 12/9/2022    Mammogram 12/15/2023 12/15/2022    DEXA Scan 12/29/2023 12/29/2020    TETANUS VACCINE 11/23/2025 11/23/2015    Lipid Panel 12/13/2026 12/13/2021        No orders of the defined types were placed in this encounter.    The following information is provided to all patients.  This information is to help you find resources for any of the problems found today that may be affecting your health:                Living healthy guide: www.[facility].[place].gov      Understanding Diabetes: www.diabetes.org      Eating healthy: www.cdc.gov/healthyweight      [facility] home safety checklist: www.cdc.gov/steadi/patient.html      Agency on Aging: www.goea.[place].gov      Alcoholics anonymous (AA): www.aa.org      Physical Activity: www.[place].nih.gov/da0xrtq      Tobacco use: www.quitwithusla.org

## 2023-01-04 ENCOUNTER — PATIENT MESSAGE (OUTPATIENT)
Dept: DERMATOLOGY | Facility: CLINIC | Age: 73
End: 2023-01-04
Payer: MEDICARE

## 2023-01-17 ENCOUNTER — PATIENT MESSAGE (OUTPATIENT)
Dept: DERMATOLOGY | Facility: CLINIC | Age: 73
End: 2023-01-17
Payer: MEDICARE

## 2023-02-28 ENCOUNTER — TELEPHONE (OUTPATIENT)
Dept: ORTHOPEDICS | Facility: CLINIC | Age: 73
End: 2023-02-28
Payer: MEDICARE

## 2023-02-28 DIAGNOSIS — M25.569 KNEE PAIN, UNSPECIFIED CHRONICITY, UNSPECIFIED LATERALITY: Primary | ICD-10-CM

## 2023-03-01 ENCOUNTER — HOSPITAL ENCOUNTER (OUTPATIENT)
Dept: RADIOLOGY | Facility: HOSPITAL | Age: 73
Discharge: HOME | End: 2023-03-01
Attending: PHYSICIAN ASSISTANT
Payer: MEDICARE

## 2023-03-01 ENCOUNTER — OFFICE VISIT (OUTPATIENT)
Dept: ORTHOPEDICS | Facility: CLINIC | Age: 73
End: 2023-03-01
Payer: MEDICARE

## 2023-03-01 VITALS
BODY MASS INDEX: 25.75 KG/M2 | HEIGHT: 66 IN | WEIGHT: 160.25 LBS | SYSTOLIC BLOOD PRESSURE: 142 MMHG | HEART RATE: 80 BPM | DIASTOLIC BLOOD PRESSURE: 75 MMHG

## 2023-03-01 DIAGNOSIS — Z96.652 AFTERCARE FOLLOWING LEFT KNEE JOINT REPLACEMENT SURGERY: ICD-10-CM

## 2023-03-01 DIAGNOSIS — M25.562 ACUTE PAIN OF LEFT KNEE: Primary | ICD-10-CM

## 2023-03-01 DIAGNOSIS — Z47.1 AFTERCARE FOLLOWING LEFT KNEE JOINT REPLACEMENT SURGERY: ICD-10-CM

## 2023-03-01 DIAGNOSIS — M25.569 KNEE PAIN, UNSPECIFIED CHRONICITY, UNSPECIFIED LATERALITY: ICD-10-CM

## 2023-03-01 DIAGNOSIS — M62.81 QUADRICEPS WEAKNESS: ICD-10-CM

## 2023-03-01 PROCEDURE — 73564 X-RAY EXAM KNEE 4 OR MORE: CPT | Mod: TC,PN,LT

## 2023-03-01 PROCEDURE — 99213 OFFICE O/P EST LOW 20 MIN: CPT | Mod: PBBFAC,PN | Performed by: PHYSICIAN ASSISTANT

## 2023-03-01 PROCEDURE — 73564 X-RAY EXAM KNEE 4 OR MORE: CPT | Mod: 26,LT,, | Performed by: RADIOLOGY

## 2023-03-01 PROCEDURE — 99203 OFFICE O/P NEW LOW 30 MIN: CPT | Mod: S$GLB,,, | Performed by: PHYSICIAN ASSISTANT

## 2023-03-01 PROCEDURE — 99999 PR PBB SHADOW E&M-EST. PATIENT-LVL III: ICD-10-PCS | Mod: PBBFAC,,, | Performed by: PHYSICIAN ASSISTANT

## 2023-03-01 PROCEDURE — 99203 PR OFFICE/OUTPT VISIT, NEW, LEVL III, 30-44 MIN: ICD-10-PCS | Mod: S$GLB,,, | Performed by: PHYSICIAN ASSISTANT

## 2023-03-01 PROCEDURE — 99999 PR PBB SHADOW E&M-EST. PATIENT-LVL III: CPT | Mod: PBBFAC,,, | Performed by: PHYSICIAN ASSISTANT

## 2023-03-01 PROCEDURE — 73564 XR KNEE COMP 4 OR MORE VIEWS LEFT: ICD-10-PCS | Mod: 26,LT,, | Performed by: RADIOLOGY

## 2023-03-01 NOTE — PROGRESS NOTES
Subjective:      Patient ID: Huber Pena is a 72 y.o. female.    Chief Complaint: Pain of the Left Knee      72-year-old female presents with one-week history of left knee pain.  States she fell down the last step of stairs.  She fell on her left side.  She is a history of left knee TKA in 2018 with Dr. Lomax.  She states her symptoms have improved and she is not having any pain today.  She would still like to have an x-ray and get her knee checked out since she has a history of the TKA.  She is also experienced some mild left lateral hip pain.  She currently walks frequently and goes to the gym.  Her symptoms are not interfering with this activity.      Review of Systems   Constitutional: Negative for chills and fever.   Cardiovascular:  Negative for chest pain.   Respiratory:  Negative for cough.    Hematologic/Lymphatic: Does not bruise/bleed easily.   Skin:  Negative for poor wound healing and rash.   Musculoskeletal:  Positive for joint pain, myalgias and stiffness.   Gastrointestinal:  Negative for abdominal pain.   Genitourinary:  Negative for bladder incontinence.   Neurological:  Negative for dizziness, loss of balance and weakness.   Psychiatric/Behavioral:  Negative for altered mental status.      Review of patient's allergies indicates:   Allergen Reactions    Benadryl [diphenhydramine hcl]      Patient states she was in her 20's and had some type of reaction but does not remember exactly what it was.      Pcn [penicillins]      Stomach issues         Current Outpatient Medications   Medication Sig Dispense Refill    ALPRAZolam (XANAX) 0.5 MG tablet Take 1 tablet (0.5 mg total) by mouth as needed for Anxiety. 30 tablet 0    clotrimazole-betamethasone 1-0.05% (LOTRISONE) cream Apply topically 2 (two) times daily. 15 g 1    desonide (DESOWEN) 0.05 % cream Apply topically 2 (two) times daily. 15 g 1    dorzolamide-timolol 2-0.5% (COSOPT) 22.3-6.8 mg/mL ophthalmic solution Place 1 drop into both eyes 2  "(two) times daily. 30 mL 3    meloxicam (MOBIC) 7.5 MG tablet TAKE 1 TABLET BY MOUTH EVERY DAY AS NEEDED 30 tablet 2    nystatin (MYCOSTATIN) powder Apply topically as needed.      terconazole (TERAZOL 7) 0.4 % Crea Place 1 applicator vaginally every evening. 7 g 0     No current facility-administered medications for this visit.        The patient's relevant past medical, surgical, and social history was reviewed in Epic.       Objective:      VITAL SIGNS: BP (!) 142/75   Pulse 80   Ht 5' 6" (1.676 m)   Wt 72.7 kg (160 lb 4.4 oz)   LMP  (LMP Unknown)   BMI 25.87 kg/m²     General    Nursing note and vitals reviewed.  Constitutional: She is oriented to person, place, and time. She appears well-developed and well-nourished.   Neurological: She is alert and oriented to person, place, and time.     General Musculoskeletal Exam   Gait: normal         Left Knee Exam     Inspection   Scars: present    Tenderness   The patient is experiencing no tenderness.     Range of Motion   Extension:  5   Flexion:  120     Tests   Stability   Lachman: normal (-1 to 2mm)   MCL - Valgus: normal (0 to 2mm)    Other   Sensation: normal    Comments:  Mild left GT bursa tenderness     Muscle Strength   Right Lower Extremity   Quadriceps:  5/5   Left Lower Extremity   Quadriceps:  4/5      X-Ray Knee Complete 4 or More Views Left  EXAMINATION:  XR KNEE COMP 4 OR MORE VIEWS LEFT    CLINICAL HISTORY:  Pain in unspecified knee    FINDINGS:  Knee complete four views left: There is a TKA in place with good alignment and no complication.  There is a small joint effusion.    Electronically signed by: Ruben Escobedo MD  Date:    03/01/2023  Time:    08:27    I have reviewed the above radiograph and agree with the findings stated by the radiologist.         Assessment:       1. Acute pain of left knee    2. Aftercare following left knee joint replacement surgery    3. Quadriceps weakness          Plan:         Huber was seen today for " pain.    Diagnoses and all orders for this visit:    Acute pain of left knee    Aftercare following left knee joint replacement surgery    Quadriceps weakness    No evidence of hardware failure or loosening on x-rays today.  Reassurance.  She does have some mild quad weakness and probable greater troch bursitis.  Demonstrated proper quad and hip strengthening exercises she can do at the gym.  Tylenol as needed for pain.  Follow-up if symptoms do not significantly improve.    Diagnoses and plan discussed with the patient, as well as the expected course and duration of his symptoms.  All questions and concerns were addressed prior to the end of the visit.   Instructed patient to call office if they have any future questions/concerns or to schedule apt. Patient will return to see me if symptoms worsen or fail to improve    Note dictated with voice recognition software, please excuse any grammatical errors.        Conchis Vernon PA-C   03/01/2023

## 2023-03-15 DIAGNOSIS — M25.562 LEFT KNEE PAIN, UNSPECIFIED CHRONICITY: Primary | ICD-10-CM

## 2023-03-16 ENCOUNTER — OFFICE VISIT (OUTPATIENT)
Dept: ORTHOPEDICS | Facility: CLINIC | Age: 73
End: 2023-03-16
Payer: MEDICARE

## 2023-03-16 VITALS
SYSTOLIC BLOOD PRESSURE: 150 MMHG | HEART RATE: 68 BPM | WEIGHT: 170.63 LBS | DIASTOLIC BLOOD PRESSURE: 90 MMHG | HEIGHT: 66 IN | BODY MASS INDEX: 27.42 KG/M2

## 2023-03-16 DIAGNOSIS — Z47.1 AFTERCARE FOLLOWING LEFT KNEE JOINT REPLACEMENT SURGERY: Primary | ICD-10-CM

## 2023-03-16 DIAGNOSIS — Z96.652 AFTERCARE FOLLOWING LEFT KNEE JOINT REPLACEMENT SURGERY: Primary | ICD-10-CM

## 2023-03-16 PROCEDURE — 1159F MED LIST DOCD IN RCRD: CPT | Mod: CPTII,S$GLB,, | Performed by: ORTHOPAEDIC SURGERY

## 2023-03-16 PROCEDURE — 1125F AMNT PAIN NOTED PAIN PRSNT: CPT | Mod: CPTII,S$GLB,, | Performed by: ORTHOPAEDIC SURGERY

## 2023-03-16 PROCEDURE — 99999 PR PBB SHADOW E&M-EST. PATIENT-LVL III: CPT | Mod: PBBFAC,,, | Performed by: ORTHOPAEDIC SURGERY

## 2023-03-16 PROCEDURE — 99213 OFFICE O/P EST LOW 20 MIN: CPT | Mod: S$GLB,,, | Performed by: ORTHOPAEDIC SURGERY

## 2023-03-16 PROCEDURE — 3008F BODY MASS INDEX DOCD: CPT | Mod: CPTII,S$GLB,, | Performed by: ORTHOPAEDIC SURGERY

## 2023-03-16 PROCEDURE — 99213 PR OFFICE/OUTPT VISIT, EST, LEVL III, 20-29 MIN: ICD-10-PCS | Mod: S$GLB,,, | Performed by: ORTHOPAEDIC SURGERY

## 2023-03-16 PROCEDURE — 3080F PR MOST RECENT DIASTOLIC BLOOD PRESSURE >= 90 MM HG: ICD-10-PCS | Mod: CPTII,S$GLB,, | Performed by: ORTHOPAEDIC SURGERY

## 2023-03-16 PROCEDURE — 3077F PR MOST RECENT SYSTOLIC BLOOD PRESSURE >= 140 MM HG: ICD-10-PCS | Mod: CPTII,S$GLB,, | Performed by: ORTHOPAEDIC SURGERY

## 2023-03-16 PROCEDURE — 3080F DIAST BP >= 90 MM HG: CPT | Mod: CPTII,S$GLB,, | Performed by: ORTHOPAEDIC SURGERY

## 2023-03-16 PROCEDURE — 1159F PR MEDICATION LIST DOCUMENTED IN MEDICAL RECORD: ICD-10-PCS | Mod: CPTII,S$GLB,, | Performed by: ORTHOPAEDIC SURGERY

## 2023-03-16 PROCEDURE — 3288F FALL RISK ASSESSMENT DOCD: CPT | Mod: CPTII,S$GLB,, | Performed by: ORTHOPAEDIC SURGERY

## 2023-03-16 PROCEDURE — 3077F SYST BP >= 140 MM HG: CPT | Mod: CPTII,S$GLB,, | Performed by: ORTHOPAEDIC SURGERY

## 2023-03-16 PROCEDURE — 99999 PR PBB SHADOW E&M-EST. PATIENT-LVL III: ICD-10-PCS | Mod: PBBFAC,,, | Performed by: ORTHOPAEDIC SURGERY

## 2023-03-16 PROCEDURE — 1101F PR PT FALLS ASSESS DOC 0-1 FALLS W/OUT INJ PAST YR: ICD-10-PCS | Mod: CPTII,S$GLB,, | Performed by: ORTHOPAEDIC SURGERY

## 2023-03-16 PROCEDURE — 3288F PR FALLS RISK ASSESSMENT DOCUMENTED: ICD-10-PCS | Mod: CPTII,S$GLB,, | Performed by: ORTHOPAEDIC SURGERY

## 2023-03-16 PROCEDURE — 1125F PR PAIN SEVERITY QUANTIFIED, PAIN PRESENT: ICD-10-PCS | Mod: CPTII,S$GLB,, | Performed by: ORTHOPAEDIC SURGERY

## 2023-03-16 PROCEDURE — 1101F PT FALLS ASSESS-DOCD LE1/YR: CPT | Mod: CPTII,S$GLB,, | Performed by: ORTHOPAEDIC SURGERY

## 2023-03-16 PROCEDURE — 3008F PR BODY MASS INDEX (BMI) DOCUMENTED: ICD-10-PCS | Mod: CPTII,S$GLB,, | Performed by: ORTHOPAEDIC SURGERY

## 2023-03-16 NOTE — PROGRESS NOTES
Subjective:      Patient ID: Huber Pena is a 72 y.o. female.    Chief Complaint: Pain of the Left Knee    Patient is 5 years s/p  left primary total knee replacement  Anterior knee pain: no  Has improved pain  Is in physical therapy  no  Problems w incision  no  Is  happy with result  yes  Opiod free: yes  Patient recently fell about 3 weeks ago and landed on her left side.  She had pain around the lateral aspect of her left hip localized.  Some minor knee pain however that has improved over the past few weeks.  She is complaining of some generalized heaviness in that leg however appears that has also been improving over the last few weeks.       Social History     Occupational History    Not on file   Tobacco Use    Smoking status: Never    Smokeless tobacco: Never   Substance and Sexual Activity    Alcohol use: No     Comment: yearly on occasions    Drug use: No    Sexual activity: Yes     Partners: Male     Birth control/protection: Post-menopausal      Review of Systems   Constitutional: Negative for diaphoresis.   HENT:  Negative for ear discharge, nosebleeds and stridor.    Eyes:  Negative for photophobia.   Cardiovascular:  Negative for syncope.   Respiratory:  Negative for hemoptysis, shortness of breath and wheezing.    Neurological:  Negative for tremors.   Psychiatric/Behavioral: Negative.         Objective:    General    Constitutional: She is oriented to person, place, and time. She appears well-developed and well-nourished.   HENT:   Head: Normocephalic and atraumatic.   Eyes: EOM are normal.   Pulmonary/Chest: Effort normal.   Neurological: She is alert and oriented to person, place, and time.   Psychiatric: She has a normal mood and affect. Her behavior is normal. Judgment and thought content normal.     General Musculoskeletal Exam   Gait: normal         Left Knee Exam     Inspection   Erythema: absent  Scars: present  Swelling: absent  Effusion: absent  Deformity: absent  Bruising:  absent    Tenderness   The patient is experiencing no tenderness.     Range of Motion   Extension:  0   Flexion:  120     Tests   Stability   PCL-Posterior Drawer: normal (0 to 2mm)  MCL - Valgus: normal (0 to 2mm)  LCL - Varus: normal (0 to 2mm)  Patella   Passive Patellar Tilt: neutral  Patellar Tracking: normal    Other   Sensation: normal    Comments:  Incision well healed       Assessment:       1. Aftercare following left knee joint replacement surgery          Plan:       Patient appears to be doing quite well with her left knee replacement.  She has some tenderness along the greater trochanter of her left hip.  No radicular symptoms or gross signs of weakness in her left leg.  I suspect the fall just exacerbated some underlying bursitis.  I have recommended going back to her normal exercise routine as well as anti-inflammatories.  If the weakness in her left leg persists we may have to begin working up her spine however have a low index of suspicion for stenosis at this point.

## 2023-03-25 NOTE — PROGRESS NOTES
HPI     Glaucoma     Additional comments: 4 month ck and pt states no changes since last exam            Comments    DLS: 11/29/22    1. POAG  2. NS OU  3. Hyperopia    MEDS:  Cosopt BID OU          Last edited by Katy Carr MA on 3/28/2023  1:08 PM.            Assessment /Plan     For exam results, see Encounter Report.    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 soft CI)        LASERS    none        GLAUCOMA SURGERIES    none        OTHER EYE SURGERIES    none        CDR    07/0.8        Tbase    >20 (24)  (per pt)         Tmax    > 20  (21)   (per pt)         Ttarget    undetermined          HVF    2 test 2022 to 2022 - not reliable, paracentral/central defects od // inferior and ST defects os        Gonio    3+ w steep approach OU        CCT    583/579        OCT    1 test 2022 to 2022 - RNFL - full od // borderline thinning temp os        Disc photos    Pending     - Ttoday   18//18  - Test done today   -IOP    2. Cataracts   - may be VS    - evaluate after next visit    3. Hyperopia / presbyopia       Try and get records from Dr. Longo    Cont cosopt ou bid     03/28/2023  - doing well, IOP well controlled on cosopt at 18 OU   - discussed early cataracts, nonVS, discussed what astigmatism is, and how needs correction w MRX OS- poor understanding but inquisitive. happy right now   - RTC in 4 mo for IOP gonio HVF (more frequent to establish baseline)    RTC in 4 months IOP gonio

## 2023-03-28 ENCOUNTER — OFFICE VISIT (OUTPATIENT)
Dept: OPHTHALMOLOGY | Facility: CLINIC | Age: 73
End: 2023-03-28
Payer: MEDICARE

## 2023-03-28 DIAGNOSIS — H40.023 OPEN ANGLE WITH BORDERLINE FINDINGS AND HIGH GLAUCOMA RISK IN BOTH EYES: Primary | ICD-10-CM

## 2023-03-28 DIAGNOSIS — H52.03 HYPEROPIA WITH PRESBYOPIA OF BOTH EYES: ICD-10-CM

## 2023-03-28 DIAGNOSIS — H52.4 HYPEROPIA WITH PRESBYOPIA OF BOTH EYES: ICD-10-CM

## 2023-03-28 DIAGNOSIS — H25.13 NUCLEAR SCLEROTIC CATARACT OF BOTH EYES: ICD-10-CM

## 2023-03-28 PROCEDURE — 1160F PR REVIEW ALL MEDS BY PRESCRIBER/CLIN PHARMACIST DOCUMENTED: ICD-10-PCS | Mod: CPTII,S$GLB,, | Performed by: OPHTHALMOLOGY

## 2023-03-28 PROCEDURE — 99214 OFFICE O/P EST MOD 30 MIN: CPT | Mod: S$GLB,,, | Performed by: OPHTHALMOLOGY

## 2023-03-28 PROCEDURE — 1159F MED LIST DOCD IN RCRD: CPT | Mod: CPTII,S$GLB,, | Performed by: OPHTHALMOLOGY

## 2023-03-28 PROCEDURE — 1159F PR MEDICATION LIST DOCUMENTED IN MEDICAL RECORD: ICD-10-PCS | Mod: CPTII,S$GLB,, | Performed by: OPHTHALMOLOGY

## 2023-03-28 PROCEDURE — 99999 PR PBB SHADOW E&M-EST. PATIENT-LVL II: ICD-10-PCS | Mod: PBBFAC,,, | Performed by: OPHTHALMOLOGY

## 2023-03-28 PROCEDURE — 1101F PT FALLS ASSESS-DOCD LE1/YR: CPT | Mod: CPTII,S$GLB,, | Performed by: OPHTHALMOLOGY

## 2023-03-28 PROCEDURE — 99214 PR OFFICE/OUTPT VISIT, EST, LEVL IV, 30-39 MIN: ICD-10-PCS | Mod: S$GLB,,, | Performed by: OPHTHALMOLOGY

## 2023-03-28 PROCEDURE — 1126F AMNT PAIN NOTED NONE PRSNT: CPT | Mod: CPTII,S$GLB,, | Performed by: OPHTHALMOLOGY

## 2023-03-28 PROCEDURE — 3288F PR FALLS RISK ASSESSMENT DOCUMENTED: ICD-10-PCS | Mod: CPTII,S$GLB,, | Performed by: OPHTHALMOLOGY

## 2023-03-28 PROCEDURE — 1126F PR PAIN SEVERITY QUANTIFIED, NO PAIN PRESENT: ICD-10-PCS | Mod: CPTII,S$GLB,, | Performed by: OPHTHALMOLOGY

## 2023-03-28 PROCEDURE — 1101F PR PT FALLS ASSESS DOC 0-1 FALLS W/OUT INJ PAST YR: ICD-10-PCS | Mod: CPTII,S$GLB,, | Performed by: OPHTHALMOLOGY

## 2023-03-28 PROCEDURE — 99999 PR PBB SHADOW E&M-EST. PATIENT-LVL II: CPT | Mod: PBBFAC,,, | Performed by: OPHTHALMOLOGY

## 2023-03-28 PROCEDURE — 1160F RVW MEDS BY RX/DR IN RCRD: CPT | Mod: CPTII,S$GLB,, | Performed by: OPHTHALMOLOGY

## 2023-03-28 PROCEDURE — 3288F FALL RISK ASSESSMENT DOCD: CPT | Mod: CPTII,S$GLB,, | Performed by: OPHTHALMOLOGY

## 2023-04-09 ENCOUNTER — NURSE TRIAGE (OUTPATIENT)
Dept: ADMINISTRATIVE | Facility: CLINIC | Age: 73
End: 2023-04-09
Payer: MEDICARE

## 2023-04-10 NOTE — TELEPHONE ENCOUNTER
Pt states that she was seen in Dentist office for pain and infection of mouth. Pt states that Dentist did xray and prescribed ABT. Pt states that she completed ABT and pain got better. States that she now noticed a growth on her gum that is tender to touch. Care advise to be seen in Dentist office when open per protocol. Pt states that she already seen dentist and would like to see PCP. Appt scheduled per protocol. Encounter routed to provider.     Reason for Disposition   Painless lump in mouth    Additional Information   Negative: SEVERE difficulty breathing (e.g., struggling for each breath, speaks in single words, stridor)   Negative: Sounds like a life-threatening emergency to the triager   Negative: [1] Drooling or spitting out saliva (because can't swallow) AND [2] new-onset   Negative: [1] Bleeding from mouth AND [2] won't stop after 10 minutes of direct pressure   Negative: Electrical burn of mouth   Negative: Chemical burn of mouth   Negative: [1] Difficulty breathing AND [2] not severe   Negative: Patient sounds very sick or weak to the triager   Negative: [1] Gum bleeding AND [2] taking Coumadin (warfarin) or other strong blood thinner, or known bleeding disorder (e.g., thrombocytopenia)   Negative: [1] Dry mouth AND [2] urinating more frequently than usual (i.e., frequency)   Negative: [1] Dry mouth AND [2] drinking more liquids than usual (thirsty) AND [3] > 1 day (24 hours)   Negative: Receiving chemotherapy or radiation therapy   Negative: [1] White patches that stick to tongue or inner cheek AND [2] can be wiped off   Negative: [1] Dry mouth AND [2] new-onset AND [3] unexplained (Exceptions: chronic symptom or dry mouth from mild dehydration)   Negative: Weak immune system (e.g., HIV positive, cancer chemo, splenectomy, organ transplant, chronic steroids)   Negative: Dentures rub and cause pain    Protocols used: Mouth Symptoms-A-AH

## 2023-04-11 ENCOUNTER — OFFICE VISIT (OUTPATIENT)
Dept: FAMILY MEDICINE | Facility: CLINIC | Age: 73
End: 2023-04-11
Payer: MEDICARE

## 2023-04-11 VITALS
WEIGHT: 171.06 LBS | HEART RATE: 75 BPM | TEMPERATURE: 98 F | OXYGEN SATURATION: 97 % | BODY MASS INDEX: 27.49 KG/M2 | DIASTOLIC BLOOD PRESSURE: 70 MMHG | SYSTOLIC BLOOD PRESSURE: 138 MMHG | HEIGHT: 66 IN

## 2023-04-11 DIAGNOSIS — K05.10 GINGIVITIS: Primary | ICD-10-CM

## 2023-04-11 DIAGNOSIS — L81.9 HYPERPIGMENTATION: ICD-10-CM

## 2023-04-11 DIAGNOSIS — K06.8: ICD-10-CM

## 2023-04-11 DIAGNOSIS — S02.5XXA CLOSED FRACTURE OF TOOTH, INITIAL ENCOUNTER: ICD-10-CM

## 2023-04-11 PROCEDURE — 1101F PT FALLS ASSESS-DOCD LE1/YR: CPT | Mod: CPTII,S$GLB,, | Performed by: FAMILY MEDICINE

## 2023-04-11 PROCEDURE — 1126F PR PAIN SEVERITY QUANTIFIED, NO PAIN PRESENT: ICD-10-PCS | Mod: CPTII,S$GLB,, | Performed by: FAMILY MEDICINE

## 2023-04-11 PROCEDURE — 3288F FALL RISK ASSESSMENT DOCD: CPT | Mod: CPTII,S$GLB,, | Performed by: FAMILY MEDICINE

## 2023-04-11 PROCEDURE — 3288F PR FALLS RISK ASSESSMENT DOCUMENTED: ICD-10-PCS | Mod: CPTII,S$GLB,, | Performed by: FAMILY MEDICINE

## 2023-04-11 PROCEDURE — 1159F PR MEDICATION LIST DOCUMENTED IN MEDICAL RECORD: ICD-10-PCS | Mod: CPTII,S$GLB,, | Performed by: FAMILY MEDICINE

## 2023-04-11 PROCEDURE — 99999 PR PBB SHADOW E&M-EST. PATIENT-LVL III: CPT | Mod: PBBFAC,,, | Performed by: FAMILY MEDICINE

## 2023-04-11 PROCEDURE — 3008F PR BODY MASS INDEX (BMI) DOCUMENTED: ICD-10-PCS | Mod: CPTII,S$GLB,, | Performed by: FAMILY MEDICINE

## 2023-04-11 PROCEDURE — 1101F PR PT FALLS ASSESS DOC 0-1 FALLS W/OUT INJ PAST YR: ICD-10-PCS | Mod: CPTII,S$GLB,, | Performed by: FAMILY MEDICINE

## 2023-04-11 PROCEDURE — 1159F MED LIST DOCD IN RCRD: CPT | Mod: CPTII,S$GLB,, | Performed by: FAMILY MEDICINE

## 2023-04-11 PROCEDURE — 3078F DIAST BP <80 MM HG: CPT | Mod: CPTII,S$GLB,, | Performed by: FAMILY MEDICINE

## 2023-04-11 PROCEDURE — 99214 OFFICE O/P EST MOD 30 MIN: CPT | Mod: S$GLB,,, | Performed by: FAMILY MEDICINE

## 2023-04-11 PROCEDURE — 3078F PR MOST RECENT DIASTOLIC BLOOD PRESSURE < 80 MM HG: ICD-10-PCS | Mod: CPTII,S$GLB,, | Performed by: FAMILY MEDICINE

## 2023-04-11 PROCEDURE — 99999 PR PBB SHADOW E&M-EST. PATIENT-LVL III: ICD-10-PCS | Mod: PBBFAC,,, | Performed by: FAMILY MEDICINE

## 2023-04-11 PROCEDURE — 3075F PR MOST RECENT SYSTOLIC BLOOD PRESS GE 130-139MM HG: ICD-10-PCS | Mod: CPTII,S$GLB,, | Performed by: FAMILY MEDICINE

## 2023-04-11 PROCEDURE — 3008F BODY MASS INDEX DOCD: CPT | Mod: CPTII,S$GLB,, | Performed by: FAMILY MEDICINE

## 2023-04-11 PROCEDURE — 99214 PR OFFICE/OUTPT VISIT, EST, LEVL IV, 30-39 MIN: ICD-10-PCS | Mod: S$GLB,,, | Performed by: FAMILY MEDICINE

## 2023-04-11 PROCEDURE — 1126F AMNT PAIN NOTED NONE PRSNT: CPT | Mod: CPTII,S$GLB,, | Performed by: FAMILY MEDICINE

## 2023-04-11 PROCEDURE — 3075F SYST BP GE 130 - 139MM HG: CPT | Mod: CPTII,S$GLB,, | Performed by: FAMILY MEDICINE

## 2023-04-11 RX ORDER — HYDROQUINONE 40 MG/G
CREAM TOPICAL 2 TIMES DAILY
Qty: 28.35 G | Refills: 2 | Status: SHIPPED | OUTPATIENT
Start: 2023-04-11

## 2023-04-11 RX ORDER — CLINDAMYCIN HYDROCHLORIDE 150 MG/1
300 CAPSULE ORAL
COMMUNITY
Start: 2023-04-05

## 2023-04-11 NOTE — PROGRESS NOTES
(Portions of this note were dictated using voice recognition software and may contain dictation related errors in spelling/grammar/syntax not found on text review)    CC:   Chief Complaint   Patient presents with    OTHER     Lump on gum       HPI: 72 y.o. female   Had significant right facial swelling and tooth pain maxillary right side.  Went to dentist, x-ray showed 2 tooth fractures.  Was started on clindamycin.  Had developed significant gingival inflammation and swelling and pain.  Did 2 days of the antibiotics and pain improved.  Still has a nodule noted on right upper gingival region, tender pills.  Has appointment with endodontist coming up in the next 6 days    Also gets a lot of allergy issues, facial dryness.  Uses moisturizers but because of some hyperpigmentation on the face nasolabial folds and and between eyes and nose she was wondering if something that she could use to lighten the skin in that area    Past Medical History:   Diagnosis Date    Anxiety     Cataract     Chronic constipation     DDD (degenerative disc disease), lumbar     Fibroid tumor     Glaucoma     Hyperplastic colon polyp     Osteoarthritis of left knee     Osteopenia     Pes planus        Past Surgical History:   Procedure Laterality Date    BREAST CYST EXCISION      Breast reduction  2004    COLONOSCOPY N/A 10/10/2018    Procedure: COLONOSCOPY;  Surgeon: Cristal Whiteside MD;  Location: [facility] ENDO;  Service: Endoscopy;  Laterality: N/A;    PERCUTANEOUS CRYOTHERAPY OF PERIPHERAL NERVE USING LIQUID NITROUS OXIDE IN CLOSED NEEDLE DEVICE Left 8/23/2018    Procedure: CRYOTHERAPY, NERVE, PERIPHERAL, PERCUTANEOUS, USING LIQUID NITROUS OXIDE IN CLOSED NEEDLE DEVICE;  Surgeon: FRANK Null;  Location: [facility] OR;  Service: Orthopedics;  Laterality: Left;    TOTAL KNEE ARTHROPLASTY Left     TOTAL REDUCTION MAMMOPLASTY      TUBAL LIGATION         Family History   Problem Relation Age of Onset    Diabetes Mother     No Known Problems  Father     Diabetes Sister         x2    Breast cancer Sister     No Known Problems Sister     No Known Problems Sister     Hypertension Brother     Seizures Brother     Allergies Brother     No Known Problems Brother     No Known Problems Brother     Stroke Brother     Depression Daughter     Anxiety disorder Daughter     No Known Problems Daughter     Anxiety disorder Son     Breast cancer Maternal Aunt     No Known Problems Maternal Uncle     No Known Problems Paternal Aunt     No Known Problems Paternal Uncle     No Known Problems Maternal Grandmother     No Known Problems Maternal Grandfather     No Known Problems Paternal Grandmother     No Known Problems Paternal Grandfather     Colon cancer Cousin     Amblyopia Neg Hx     Blindness Neg Hx     Cancer Neg Hx     Cataracts Neg Hx     Glaucoma Neg Hx     Macular degeneration Neg Hx     Retinal detachment Neg Hx     Strabismus Neg Hx     Thyroid disease Neg Hx     Melanoma Neg Hx        Social History     Tobacco Use    Smoking status: Never    Smokeless tobacco: Never   Substance Use Topics    Alcohol use: No     Comment: yearly on occasions    Drug use: No       Lab Results   Component Value Date    WBC 5.54 12/06/2022    HGB 13.2 12/06/2022    HCT 40.2 12/06/2022    MCV 95 12/06/2022     12/06/2022    CHOL 197 12/13/2021    TRIG 69 12/13/2021    HDL 75 12/13/2021    ALT 17 12/06/2022    AST 19 12/06/2022    BILITOT 0.7 12/06/2022    ALKPHOS 80 12/06/2022     12/06/2022    K 4.3 12/06/2022     12/06/2022    CREATININE 0.8 12/06/2022    ESTGFRAFRICA >60 12/13/2021    EGFRNONAA >60 12/13/2021    EGFRNORACEVR >60 12/06/2022    CALCIUM 9.2 12/06/2022    ALBUMIN 3.7 12/06/2022    BUN 12 12/06/2022    CO2 23 12/06/2022    TSH 0.875 12/06/2022    INR 1.0 07/30/2018    HGBA1C 5.5 12/09/2022    LDLCALC 108.2 12/13/2021    GLU 91 12/06/2022    RELGPYAT58LJ 60 12/06/2022                 Vital signs reviewed  Vitals:    04/11/23 1133   BP: 138/70   BP  "Location: Left arm   Patient Position: Sitting   BP Method: Medium (Manual)   Pulse: 75   Temp: 97.8 °F (36.6 °C)   TempSrc: Oral   SpO2: 97%   Weight: 77.6 kg (171 lb 1.2 oz)   Height: 5' 6" (1.676 m)       Wt Readings from Last 4 Encounters:   04/11/23 77.6 kg (171 lb 1.2 oz)   03/16/23 77.4 kg (170 lb 10.2 oz)   03/01/23 72.7 kg (160 lb 4.4 oz)   12/28/22 79.4 kg (175 lb)       PE:   APPEARANCE: Well nourished, well developed, in no acute distress.    HEAD: Normocephalic, atraumatic.  Mild facial hyperpigmentation noted  EYES:    Conjunctivae noninjected.   MOUTH & THROAT:  Right upper gingival nodule noted with some slight tenderness, redness of the surrounding gingival tissue  NECK: Supple with no cervical lymphadenopathy.    CHEST: Good inspiratory effort. Lungs clear to auscultation with no wheezes or crackles.  CARDIOVASCULAR: Normal S1, S2. No rubs, murmurs, or gallops.  ABDOMEN: Bowel sounds normal. Not distended. Soft. No tenderness or masses. No organomegaly.  EXTREMITIES: No edema, cyanosis, or clubbing.      IMPRESSION  1. Gingivitis    2. Closed fracture of tooth, initial encounter    3. Gingival nodule    4. Hyperpigmentation            PLAN  No orders of the defined types were placed in this encounter.    Medications Ordered This Encounter   Medications    hydroquinone 4 % Crea     Sig: Apply topically 2 (two) times daily.     Dispense:  28.35 g     Refill:  2      Suspect nodule related to gingivitis.  Continue and finish clindamycin antibiotics prescribed from dentist.  Follow up with endodontist as directed.  Can try Listerine washes twice daily as well.  Does not feel like she needs anything for pain at this time    Hyperpigmentation of facial skin, likely from allergies and dryness.  Uses facial moisturizers regularly.  She wondering there something that she can use to lighten the skin.  Can send over hydroquinone prescription but caution regarding any irritation of the skin, avoid directly " around the eyes

## 2023-05-02 ENCOUNTER — TELEPHONE (OUTPATIENT)
Dept: DERMATOLOGY | Facility: CLINIC | Age: 73
End: 2023-05-02
Payer: MEDICARE

## 2023-05-22 ENCOUNTER — OFFICE VISIT (OUTPATIENT)
Dept: DERMATOLOGY | Facility: CLINIC | Age: 73
End: 2023-05-22
Payer: MEDICARE

## 2023-05-22 DIAGNOSIS — L72.0 EPIDERMAL CYST: Primary | ICD-10-CM

## 2023-05-22 PROCEDURE — 1159F MED LIST DOCD IN RCRD: CPT | Mod: CPTII,S$GLB,, | Performed by: DERMATOLOGY

## 2023-05-22 PROCEDURE — 99999 PR PBB SHADOW E&M-EST. PATIENT-LVL II: CPT | Mod: PBBFAC,,, | Performed by: DERMATOLOGY

## 2023-05-22 PROCEDURE — 99212 PR OFFICE/OUTPT VISIT, EST, LEVL II, 10-19 MIN: ICD-10-PCS | Mod: S$GLB,,, | Performed by: DERMATOLOGY

## 2023-05-22 PROCEDURE — 1101F PT FALLS ASSESS-DOCD LE1/YR: CPT | Mod: CPTII,S$GLB,, | Performed by: DERMATOLOGY

## 2023-05-22 PROCEDURE — 99212 OFFICE O/P EST SF 10 MIN: CPT | Mod: S$GLB,,, | Performed by: DERMATOLOGY

## 2023-05-22 PROCEDURE — 3288F PR FALLS RISK ASSESSMENT DOCUMENTED: ICD-10-PCS | Mod: CPTII,S$GLB,, | Performed by: DERMATOLOGY

## 2023-05-22 PROCEDURE — 1160F PR REVIEW ALL MEDS BY PRESCRIBER/CLIN PHARMACIST DOCUMENTED: ICD-10-PCS | Mod: CPTII,S$GLB,, | Performed by: DERMATOLOGY

## 2023-05-22 PROCEDURE — 99999 PR PBB SHADOW E&M-EST. PATIENT-LVL II: ICD-10-PCS | Mod: PBBFAC,,, | Performed by: DERMATOLOGY

## 2023-05-22 PROCEDURE — 1160F RVW MEDS BY RX/DR IN RCRD: CPT | Mod: CPTII,S$GLB,, | Performed by: DERMATOLOGY

## 2023-05-22 PROCEDURE — 3288F FALL RISK ASSESSMENT DOCD: CPT | Mod: CPTII,S$GLB,, | Performed by: DERMATOLOGY

## 2023-05-22 PROCEDURE — 1159F PR MEDICATION LIST DOCUMENTED IN MEDICAL RECORD: ICD-10-PCS | Mod: CPTII,S$GLB,, | Performed by: DERMATOLOGY

## 2023-05-22 PROCEDURE — 1101F PR PT FALLS ASSESS DOC 0-1 FALLS W/OUT INJ PAST YR: ICD-10-PCS | Mod: CPTII,S$GLB,, | Performed by: DERMATOLOGY

## 2023-05-22 NOTE — PROGRESS NOTES
Subjective:      Patient ID:  Huber Pena is a 72 y.o. female who presents for   Chief Complaint   Patient presents with    Cyst     Right jaw      Cyst - Initial  Affected locations: right cheek  Duration: 10 years  Severity: mild to moderate  Timing: constant    Review of Systems   Constitutional: Negative.    HENT: Negative.     Respiratory: Negative.     Musculoskeletal: Negative.      Objective:   Physical Exam   Constitutional: She appears well-developed and well-nourished.   Neurological: She is alert and oriented to person, place, and time.   Psychiatric: She has a normal mood and affect.   Skin:   Areas Examined (abnormalities noted in diagram):   Head / Face Inspection Performed          Diagram Legend     Erythematous scaling macule/papule c/w actinic keratosis       Vascular papule c/w angioma      Pigmented verrucoid papule/plaque c/w seborrheic keratosis      Yellow umbilicated papule c/w sebaceous hyperplasia      Irregularly shaped tan macule c/w lentigo     1-2 mm smooth white papules consistent with Milia      Movable subcutaneous cyst with punctum c/w epidermal inclusion cyst      Subcutaneous movable cyst c/w pilar cyst      Firm pink to brown papule c/w dermatofibroma      Pedunculated fleshy papule(s) c/w skin tag(s)      Evenly pigmented macule c/w junctional nevus     Mildly variegated pigmented, slightly irregular-bordered macule c/w mildly atypical nevus      Flesh colored to evenly pigmented papule c/w intradermal nevus       Pink pearly papule/plaque c/w basal cell carcinoma      Erythematous hyperkeratotic cursted plaque c/w SCC      Surgical scar with no sign of skin cancer recurrence      Open and closed comedones      Inflammatory papules and pustules      Verrucoid papule consistent consistent with wart     Erythematous eczematous patches and plaques     Dystrophic onycholytic nail with subungual debris c/w onychomycosis     Umbilicated papule    Erythematous-base heme-crusted  tan verrucoid plaque consistent with inflamed seborrheic keratosis     Erythematous Silvery Scaling Plaque c/w Psoriasis     See annotation      Assessment / Plan:        Epidermal cyst  [facility] and or records and notes reviewed and considered for their impact on our clinical decision making today.  Discussed with patient the likelihood that this lesion is an epidermal cyst caused by an involuted lining of skin with dead skin trapped inside.  Cysts do not have a malignant potential but can become infected and turn into abscesses with possible cellulitis.  Discussed the option of warm compresses if infected with oral antibiotics.  Discussed the option of surgical excision with scar, possible recurrence, hematoma, and infection.  Patient to consider these options.  Pt wishes to leave alone.  Only wanted a routine check up today.           Follow up if symptoms worsen or fail to improve.

## 2023-06-28 NOTE — TELEPHONE ENCOUNTER
Last office visit date: 2-2-23    Next appointment scheduled?: Yes   Number of refills given:0      Mediation Refill Protocol Failed.  Failed criteria: Lipid Panel resulted within last 15 months. Sent to clinician to review. No protocol for requested medication         Orders pended, and routed to provider's nurse pool for review and or approval.   Please route any notes back to your nursing pool.       Thank you, Refill Center Staff       Orders placed, pls schedule

## 2023-07-08 NOTE — PROGRESS NOTES
HPI     Glaucoma     Additional comments: 4 month ck and pt states no change since last exam            Comments    DLS: 3/28/22    1. POAG  2. NS OU  3. Hyperopia    MEDS:  Cosopt BID OU          Last edited by Katy Carr MA on 7/11/2023  1:29 PM.            Assessment /Plan     For exam results, see Encounter Report.    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 soft CI)        LASERS    none        GLAUCOMA SURGERIES    none        OTHER EYE SURGERIES    none        CDR    07/0.8        Tbase    >20 (24)  (per pt)         Tmax    > 20  (21)   (per pt)         Ttarget    undetermined          HVF    2 test 2022 to 2022 - not reliable, paracentral/central defects od // inferior and ST defects os        Gonio    3+ w steep approach OU        CCT    583/579        OCT    1 test 2022 to 2022 - RNFL - full od // borderline thinning temp os        Disc photos    Pending     - Ttoday  20/20  - Test done today   -IOP// gonio     2. Cataracts   - may be VS    - evaluate after next visit    3. Hyperopia / presbyopia       Try and get records from Dr. Donta Pennington cosopt ou bid     03/28/2023  - doing well, IOP well controlled on cosopt at 18 - 20  OU   - discussed early cataracts, nonVS, discussed what astigmatism is, and how needs correction w MRX OS- poor understanding but inquisitive. happy right now     RTC in 4 months IOP // HVF // DFE // disc photos

## 2023-07-11 ENCOUNTER — OFFICE VISIT (OUTPATIENT)
Dept: OPHTHALMOLOGY | Facility: CLINIC | Age: 73
End: 2023-07-11
Payer: MEDICARE

## 2023-07-11 DIAGNOSIS — H25.13 NUCLEAR SCLEROTIC CATARACT OF BOTH EYES: ICD-10-CM

## 2023-07-11 DIAGNOSIS — H52.4 HYPEROPIA WITH PRESBYOPIA OF BOTH EYES: ICD-10-CM

## 2023-07-11 DIAGNOSIS — H52.03 HYPEROPIA WITH PRESBYOPIA OF BOTH EYES: ICD-10-CM

## 2023-07-11 DIAGNOSIS — H40.023 OPEN ANGLE WITH BORDERLINE FINDINGS AND HIGH GLAUCOMA RISK IN BOTH EYES: Primary | ICD-10-CM

## 2023-07-11 PROCEDURE — 99214 OFFICE O/P EST MOD 30 MIN: CPT | Mod: S$GLB,,, | Performed by: OPHTHALMOLOGY

## 2023-07-11 PROCEDURE — 1159F MED LIST DOCD IN RCRD: CPT | Mod: CPTII,S$GLB,, | Performed by: OPHTHALMOLOGY

## 2023-07-11 PROCEDURE — 1101F PT FALLS ASSESS-DOCD LE1/YR: CPT | Mod: CPTII,S$GLB,, | Performed by: OPHTHALMOLOGY

## 2023-07-11 PROCEDURE — 1160F RVW MEDS BY RX/DR IN RCRD: CPT | Mod: CPTII,S$GLB,, | Performed by: OPHTHALMOLOGY

## 2023-07-11 PROCEDURE — 1101F PR PT FALLS ASSESS DOC 0-1 FALLS W/OUT INJ PAST YR: ICD-10-PCS | Mod: CPTII,S$GLB,, | Performed by: OPHTHALMOLOGY

## 2023-07-11 PROCEDURE — 99214 PR OFFICE/OUTPT VISIT, EST, LEVL IV, 30-39 MIN: ICD-10-PCS | Mod: S$GLB,,, | Performed by: OPHTHALMOLOGY

## 2023-07-11 PROCEDURE — 1126F PR PAIN SEVERITY QUANTIFIED, NO PAIN PRESENT: ICD-10-PCS | Mod: CPTII,S$GLB,, | Performed by: OPHTHALMOLOGY

## 2023-07-11 PROCEDURE — 99999 PR PBB SHADOW E&M-EST. PATIENT-LVL II: ICD-10-PCS | Mod: PBBFAC,,, | Performed by: OPHTHALMOLOGY

## 2023-07-11 PROCEDURE — 3288F FALL RISK ASSESSMENT DOCD: CPT | Mod: CPTII,S$GLB,, | Performed by: OPHTHALMOLOGY

## 2023-07-11 PROCEDURE — 1160F PR REVIEW ALL MEDS BY PRESCRIBER/CLIN PHARMACIST DOCUMENTED: ICD-10-PCS | Mod: CPTII,S$GLB,, | Performed by: OPHTHALMOLOGY

## 2023-07-11 PROCEDURE — 3288F PR FALLS RISK ASSESSMENT DOCUMENTED: ICD-10-PCS | Mod: CPTII,S$GLB,, | Performed by: OPHTHALMOLOGY

## 2023-07-11 PROCEDURE — 99999 PR PBB SHADOW E&M-EST. PATIENT-LVL II: CPT | Mod: PBBFAC,,, | Performed by: OPHTHALMOLOGY

## 2023-07-11 PROCEDURE — 1126F AMNT PAIN NOTED NONE PRSNT: CPT | Mod: CPTII,S$GLB,, | Performed by: OPHTHALMOLOGY

## 2023-07-11 PROCEDURE — 1159F PR MEDICATION LIST DOCUMENTED IN MEDICAL RECORD: ICD-10-PCS | Mod: CPTII,S$GLB,, | Performed by: OPHTHALMOLOGY

## 2023-07-11 RX ORDER — DORZOLAMIDE HYDROCHLORIDE AND TIMOLOL MALEATE 20; 5 MG/ML; MG/ML
1 SOLUTION/ DROPS OPHTHALMIC 2 TIMES DAILY
Qty: 30 ML | Refills: 3 | Status: SHIPPED | OUTPATIENT
Start: 2023-07-11 | End: 2023-07-14

## 2023-07-12 ENCOUNTER — TELEPHONE (OUTPATIENT)
Dept: FAMILY MEDICINE | Facility: CLINIC | Age: 73
End: 2023-07-12
Payer: MEDICARE

## 2023-07-12 DIAGNOSIS — Z12.31 ENCOUNTER FOR SCREENING MAMMOGRAM FOR BREAST CANCER: Primary | ICD-10-CM

## 2023-07-12 DIAGNOSIS — Z00.00 ROUTINE GENERAL MEDICAL EXAMINATION AT A HEALTH CARE FACILITY: ICD-10-CM

## 2023-07-12 DIAGNOSIS — I70.0 AORTIC ATHEROSCLEROSIS: ICD-10-CM

## 2023-07-12 DIAGNOSIS — R73.09 ELEVATED HEMOGLOBIN A1C: ICD-10-CM

## 2023-07-12 DIAGNOSIS — Z13.6 ENCOUNTER FOR SCREENING FOR CARDIOVASCULAR DISORDERS: ICD-10-CM

## 2023-07-12 NOTE — TELEPHONE ENCOUNTER
Can do labs in December before her annual visit.  Orders Placed This Encounter   Procedures    Mammo Digital Screening Bilat w/ [place]    CBC Auto Differential    Comprehensive Metabolic Panel    Lipid Panel    TSH    Hemoglobin A1C

## 2023-07-13 ENCOUNTER — PATIENT MESSAGE (OUTPATIENT)
Dept: FAMILY MEDICINE | Facility: CLINIC | Age: 73
End: 2023-07-13
Payer: MEDICARE

## 2023-07-14 DIAGNOSIS — H40.023 OPEN ANGLE WITH BORDERLINE FINDINGS AND HIGH GLAUCOMA RISK IN BOTH EYES: ICD-10-CM

## 2023-07-14 RX ORDER — DORZOLAMIDE HYDROCHLORIDE AND TIMOLOL MALEATE 20; 5 MG/ML; MG/ML
SOLUTION/ DROPS OPHTHALMIC
Qty: 30 ML | Refills: 3 | Status: SHIPPED | OUTPATIENT
Start: 2023-07-14 | End: 2024-03-26 | Stop reason: SDUPTHER

## 2023-08-04 ENCOUNTER — TELEPHONE (OUTPATIENT)
Dept: FAMILY MEDICINE | Facility: CLINIC | Age: 73
End: 2023-08-04
Payer: MEDICARE

## 2023-08-04 DIAGNOSIS — M85.88 OTHER SPECIFIED DISORDERS OF BONE DENSITY AND STRUCTURE, OTHER SITE: Primary | ICD-10-CM

## 2023-08-04 NOTE — TELEPHONE ENCOUNTER
Dxa ordered    Any reason for urine? Typically don't get screening urine test unless specific concern.

## 2023-08-04 NOTE — TELEPHONE ENCOUNTER
----- Message from Kimberly Diego sent at 8/4/2023  2:26 PM CDT -----  Type:  Needs Medical Advice    Who Called: pt     Would the patient rather a call back or a response via [facility]? call  Best Call Back Number: Radha Lopez  Additional Information: pt is requesting to get a order to have a bone density test done

## 2023-08-04 NOTE — TELEPHONE ENCOUNTER
----- Message from Kimberly Diego sent at 8/4/2023  2:35 PM CDT -----  Type:  Needs Medical Advice    Who Called: pt     Would the patient rather a call back or a response via [facility]? call  Best Call Back Number: [phone]  Additional Information: pt is requesting to get the annual blood work that is needed and make sure to include th A1-C and Urine

## 2023-08-21 ENCOUNTER — HOSPITAL ENCOUNTER (OUTPATIENT)
Dept: RADIOLOGY | Facility: HOSPITAL | Age: 73
Discharge: HOME | End: 2023-08-21
Attending: FAMILY MEDICINE
Payer: MEDICARE

## 2023-08-21 DIAGNOSIS — M85.88 OTHER SPECIFIED DISORDERS OF BONE DENSITY AND STRUCTURE, OTHER SITE: ICD-10-CM

## 2023-08-21 PROCEDURE — 77080 DXA BONE DENSITY AXIAL SKELETON 1 OR MORE SITES: ICD-10-PCS | Mod: 26,,, | Performed by: RADIOLOGY

## 2023-08-21 PROCEDURE — 77080 DXA BONE DENSITY AXIAL: CPT | Mod: 26,,, | Performed by: RADIOLOGY

## 2023-08-21 PROCEDURE — 77080 DXA BONE DENSITY AXIAL: CPT | Mod: TC

## 2023-09-18 ENCOUNTER — TELEPHONE (OUTPATIENT)
Dept: FAMILY MEDICINE | Facility: CLINIC | Age: 73
End: 2023-09-18
Payer: MEDICARE

## 2023-09-18 DIAGNOSIS — Z12.11 COLON CANCER SCREENING: Primary | ICD-10-CM

## 2023-09-18 DIAGNOSIS — Z01.419 ROUTINE GYNECOLOGICAL EXAMINATION: ICD-10-CM

## 2023-09-18 NOTE — TELEPHONE ENCOUNTER
Placed referral as below per request, placed colonoscopy order as well.  Orders Placed This Encounter   Procedures    Ambulatory referral/consult to Obstetrics / Gynecology

## 2023-09-18 NOTE — TELEPHONE ENCOUNTER
----- Message from David Walker sent at 9/18/2023  9:47 AM CDT -----  Contact: [phone]  [email or web address]     Patient is calling for Medical Advice regarding: Pt said she needs a call back about questions she has on some shots and issues with her ears as well     How long has patient had these symptoms:    Pharmacy name and phone#:  [id number] - [place] - [address] AT [facility]  [address] [address]  Phone: [phone] Fax: [phone]      Would like response via [facility]:  call back     Comments:    Pt said she needs a call back she has been calling and never got a call back.

## 2023-09-18 NOTE — TELEPHONE ENCOUNTER
Returned patient's call. She asked about the pneumonia and flu vaccines. I explained that since she had had the two doses of the pneumonia vaccine, she does not need the new one. I also told her that she will need to get the flu shot at a pharmacy, and that [facility] has it now.    She requested an order for her colonoscopy, which is due in October, and a referral to OB-GYN because her gynecologist has left [facility].

## 2023-10-04 ENCOUNTER — TELEPHONE (OUTPATIENT)
Dept: GASTROENTEROLOGY | Facility: CLINIC | Age: 73
End: 2023-10-04
Payer: MEDICARE

## 2023-10-10 ENCOUNTER — PATIENT MESSAGE (OUTPATIENT)
Dept: ADMINISTRATIVE | Facility: HOSPITAL | Age: 73
End: 2023-10-10
Payer: MEDICARE

## 2023-10-11 ENCOUNTER — TELEPHONE (OUTPATIENT)
Dept: GASTROENTEROLOGY | Facility: CLINIC | Age: 73
End: 2023-10-11
Payer: MEDICARE

## 2023-10-13 ENCOUNTER — TELEPHONE (OUTPATIENT)
Dept: GASTROENTEROLOGY | Facility: CLINIC | Age: 73
End: 2023-10-13
Payer: MEDICARE

## 2023-10-13 NOTE — TELEPHONE ENCOUNTER
Endoscopy Scheduling Questionnaire:         Are you taking any blood thinners? no               If Yes  Have you been on them for longer than one year? N/a    2. Have you been diagnosed with Diverticulitis in past three months?  no    3. Are you on dialysis or have Kidney Disease? no    4. Previous Colonoscopy?  yes         If yes Do you have a history of colon polyps?  yes    5. Family History of Colon Cancer: no         Relation: n/a       Age at Diagnosis: n/a      6. Are you a diabetic?  no    7. Do you have a history of constipation?  yes    8. Are you taking a GLP-1 Agonist/Adipex (weight loss drugs)? no  Dulaglutide (Trulicity) (weekly)  Exenatide extended release (Bydureon bcise) (weekly)  Exenatide (Byetta) (twice daily)  Semaglutide (Ozempic) (weekly)  Liraglutide (Victoza, Saxenda) (daily)  Lixisenatide (Adlyxin) (daily)  Semaglutide (Rybelsus) (taken by mouth once daily)    Hold GLP-1 agonists on the day of the procedure/surgery for patients who take the medication daily.    Hold GLP-1 agonists a week prior to the procedure/surgery for patients who take the medication weekly.    Procedure scheduled with Dr. Little  on  12/5/2023    The prep being used is Suprep     The patient's prep instructions were sent via patient portal.         SUPREP Instructions    [facility] [address]  Clinic Office [phone]  Endoscopy Lab [phone]    You are scheduled for a Colonoscopy with Dr. Little  on 12/5/2023 at [facility] in [place].    Check in at the Hospital -1st floor, Information desk.   Call [phone] to reschedule.    An adult friend/family member must come with you to drive you home.  You cannot drive, take a taxi, Uber/Lyft or bus to leave the Endoscopy Center alone.  If you do not have someone to drive you home, your test will be cancelled.     Please follow the directions of your doctor if you take any pills that thin your blood. If you take these meds:  Aggrenox, Brilinta, Effient, Eliquis, Lovenox, Plavix, Pletal, Pradaxa, Ticilid, Xarelto or Coumadin, let the doctor's office know.    Please hold any GLP-1 medications prior to the procedure: Dulaglutide Trulicity(hold week prior), Exenatide Byetta (hold the morning of procedure), Semaglutide Ozempic (hold week prior), Liraglutide Victoza, Saxenda(hold week prior), Lixisenatide Adlyxin (hold the morning of procedure), Semaglutide Rybelsus (hold the morning of procedure), Tirzepatide Mounjaro (hold week prior)     DON'T: On the morning of the test do not take insulin or pills for diabetes.     DO: On the morning of the test, do take any pills for blood pressure, heart, anti-rejection and or seizures with a small sip of water. Bring any inhalers with you.    To have a good prep, you must follow these instructions - please do not use the directions from the pharmacy.    The doctor will send a prescription for the SUPREP.      The Day Before the test:    You can only drink CLEAR LIQUIDS the whole day before your test.  You can't eat any food for the whole day.    You CAN have:  Water, Coffee or decaf coffee (no milk or cream)  Tea  Soft drinks - regular and sugar free  Jello (green or yellow)  Apple Juice, white grape juice, white cranberry juice  Gatorade, Power Aid, Crystal Light, Tomasz Aid  Lemonade and Limeade  Bouillon, clear soup  Snowball, popsicles  YOU CAN'T DRINK ANYTHING RED, PURPLE ORANGE OR BLUE   YOU CAN'T DRINK ALCOHOL  ONLY DRINK WHAT IS ON THE LIST      At 5 pm the night before your test:    Pour the 1st bottle of SUPREP into the cup provided in the box. Add water to the line on the cup and mix well.  Drink the whole cup and then drink 2 more full cups of water over 1 hour.  This can be easier to drink if it is cold. You can mix it 20 minutes ahead of time and place in the refrigerator before you drink it.  You must drink it within 30-45 minutes of mixing it.  Do NOT pour the drink over  ice.  You can drink it with a straw.    The Day of the test - We will call you 2 days before your test to tell you what time to get there.    5 hours before you come to the hospital (this may be in the middle of the night)  Pour the 2nd bottle of SUPREP into the cup provided in the box. Add water to the line on the cup and mix well.  Drink the whole cup and then drink 2 more full cups of water over 1 hour.  It might be easier to drink if it is cold. You can mix it 20 minutes ahead of time and place in the refrigerator before you drink it.  You must drink it within 30-45 minutes of mixing it.  Do NOT pour the drink over ice.  You can drink it with a straw.    YOU CAN'T EAT OR DRINK ANYTHING ELSE ONCE YOU FINISH THE PREP    Leave all valuables and jewelry at home. You will be at the hospital for 2-4 hours.    Call the Endoscopy department at [phone] with any questions about your procedure.

## 2023-10-16 RX ORDER — SODIUM, POTASSIUM,MAG SULFATES 17.5-3.13G
1 SOLUTION, RECONSTITUTED, ORAL ORAL DAILY
Qty: 1 KIT | Refills: 0 | Status: SHIPPED | OUTPATIENT
Start: 2023-10-16 | End: 2023-10-18

## 2023-11-07 ENCOUNTER — PATIENT MESSAGE (OUTPATIENT)
Dept: FAMILY MEDICINE | Facility: CLINIC | Age: 73
End: 2023-11-07
Payer: MEDICARE

## 2023-11-12 NOTE — PROGRESS NOTES
HPI    DLS: 7/11/2023    Pt here for HVF review/OCT;  Pt states no eye pain or discomfort. Pt denies any vision changes that she   is aware of.     Meds;  Cosopt BID OU    1. POAG   2. NS OU   3. Hyperopia     Last edited by Reny Aguirre on 11/14/2023  1:29 PM.          Assessment /Plan     For exam results, see Encounter Report.    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 soft CI)        LASERS    none        GLAUCOMA SURGERIES    none        OTHER EYE SURGERIES    none        CDR    07/0.8        Tbase    >20 (24)  (per pt)         Tmax    > 20  (21)   (per pt)         Ttarget    undetermined          HVF    3 test 2022 to 2023 - not reliable, paracentral/central defects od // inferior and ST defects os        Gonio    3+ w steep approach OU        CCT    583/579        OCT    2 test 2022 to 2023 - RNFL - full od // borderline thinning temp os        Disc photos    Pending     - Ttoday  18/19  - Test done today   -IOP// HVF / DFE / OCT     2. Cataracts   - may be VS    - evaluate after next visit    3. Hyperopia / presbyopia       Try and get records from Dr. Longo    Cont cosopt ou bid     03/28/2023  - doing well, IOP well controlled on cosopt at 18 - 20  OU   - discussed early cataracts, nonVS, discussed what astigmatism is, and how needs correction w MRX OS- poor understanding but inquisitive. happy right now     RTC in 4 months IOP / gonio  // - still NEEDS disc photos - OCT done today instead of disc photos

## 2023-11-14 ENCOUNTER — OFFICE VISIT (OUTPATIENT)
Dept: OPHTHALMOLOGY | Facility: CLINIC | Age: 73
End: 2023-11-14
Payer: MEDICARE

## 2023-11-14 ENCOUNTER — CLINICAL SUPPORT (OUTPATIENT)
Dept: OPHTHALMOLOGY | Facility: CLINIC | Age: 73
End: 2023-11-14
Payer: MEDICARE

## 2023-11-14 DIAGNOSIS — H52.4 HYPEROPIA WITH PRESBYOPIA OF BOTH EYES: ICD-10-CM

## 2023-11-14 DIAGNOSIS — H52.03 HYPEROPIA WITH PRESBYOPIA OF BOTH EYES: ICD-10-CM

## 2023-11-14 DIAGNOSIS — H40.023 OPEN ANGLE WITH BORDERLINE FINDINGS AND HIGH GLAUCOMA RISK IN BOTH EYES: ICD-10-CM

## 2023-11-14 DIAGNOSIS — H40.023 OPEN ANGLE WITH BORDERLINE FINDINGS AND HIGH GLAUCOMA RISK IN BOTH EYES: Primary | ICD-10-CM

## 2023-11-14 DIAGNOSIS — H25.13 NUCLEAR SCLEROTIC CATARACT OF BOTH EYES: ICD-10-CM

## 2023-11-14 PROCEDURE — 99999 PR PBB SHADOW E&M-EST. PATIENT-LVL II: ICD-10-PCS | Mod: PBBFAC,,, | Performed by: OPHTHALMOLOGY

## 2023-11-14 PROCEDURE — 99214 PR OFFICE/OUTPT VISIT, EST, LEVL IV, 30-39 MIN: ICD-10-PCS | Mod: S$GLB,,, | Performed by: OPHTHALMOLOGY

## 2023-11-14 PROCEDURE — 1160F PR REVIEW ALL MEDS BY PRESCRIBER/CLIN PHARMACIST DOCUMENTED: ICD-10-PCS | Mod: CPTII,S$GLB,, | Performed by: OPHTHALMOLOGY

## 2023-11-14 PROCEDURE — 3288F PR FALLS RISK ASSESSMENT DOCUMENTED: ICD-10-PCS | Mod: CPTII,S$GLB,, | Performed by: OPHTHALMOLOGY

## 2023-11-14 PROCEDURE — 1159F PR MEDICATION LIST DOCUMENTED IN MEDICAL RECORD: ICD-10-PCS | Mod: CPTII,S$GLB,, | Performed by: OPHTHALMOLOGY

## 2023-11-14 PROCEDURE — 1126F PR PAIN SEVERITY QUANTIFIED, NO PAIN PRESENT: ICD-10-PCS | Mod: CPTII,S$GLB,, | Performed by: OPHTHALMOLOGY

## 2023-11-14 PROCEDURE — 3288F FALL RISK ASSESSMENT DOCD: CPT | Mod: CPTII,S$GLB,, | Performed by: OPHTHALMOLOGY

## 2023-11-14 PROCEDURE — 92133 CPTRZD OPH DX IMG PST SGM ON: CPT | Mod: S$GLB,,, | Performed by: OPHTHALMOLOGY

## 2023-11-14 PROCEDURE — 1101F PT FALLS ASSESS-DOCD LE1/YR: CPT | Mod: CPTII,S$GLB,, | Performed by: OPHTHALMOLOGY

## 2023-11-14 PROCEDURE — 99999 PR PBB SHADOW E&M-EST. PATIENT-LVL II: CPT | Mod: PBBFAC,,, | Performed by: OPHTHALMOLOGY

## 2023-11-14 PROCEDURE — 92133 POSTERIOR SEGMENT OCT OPTIC NERVE(OCULAR COHERENCE TOMOGRAPHY) - OU - BOTH EYES: ICD-10-PCS | Mod: S$GLB,,, | Performed by: OPHTHALMOLOGY

## 2023-11-14 PROCEDURE — 1160F RVW MEDS BY RX/DR IN RCRD: CPT | Mod: CPTII,S$GLB,, | Performed by: OPHTHALMOLOGY

## 2023-11-14 PROCEDURE — 1159F MED LIST DOCD IN RCRD: CPT | Mod: CPTII,S$GLB,, | Performed by: OPHTHALMOLOGY

## 2023-11-14 PROCEDURE — 1101F PR PT FALLS ASSESS DOC 0-1 FALLS W/OUT INJ PAST YR: ICD-10-PCS | Mod: CPTII,S$GLB,, | Performed by: OPHTHALMOLOGY

## 2023-11-14 PROCEDURE — 99214 OFFICE O/P EST MOD 30 MIN: CPT | Mod: S$GLB,,, | Performed by: OPHTHALMOLOGY

## 2023-11-14 PROCEDURE — 1126F AMNT PAIN NOTED NONE PRSNT: CPT | Mod: CPTII,S$GLB,, | Performed by: OPHTHALMOLOGY

## 2023-11-14 NOTE — PROGRESS NOTES
OCT DONE OU     24-2  SS DONE OU     REL & FIX =  GOOD OU     COOP =      GOOD     PATIENT HAS NO ALLERGIES TO LATEX OR ADHESIVES AT THIS TIME    JTHOMAS    WRX  7/2023      +.75 +.25 X 15  OD    +.25 + 1.25 X 5  OS

## 2023-11-27 ENCOUNTER — PATIENT OUTREACH (OUTPATIENT)
Dept: ADMINISTRATIVE | Facility: HOSPITAL | Age: 73
End: 2023-11-27
Payer: MEDICARE

## 2023-11-27 NOTE — PROGRESS NOTES
Care Everywhere updates requested and reviewed.  Immunizations reconciled. Media reports reviewed.  Duplicate HM overrides and  orders removed.  Overdue HM topic chart audit and/or requested.       Health Maintenance Due   Topic Date Due    High Dose Statin  Never done    Colorectal Cancer Screening  10/10/2023    Mammogram  12/15/2023

## 2023-12-01 ENCOUNTER — TELEPHONE (OUTPATIENT)
Dept: ENDOSCOPY | Facility: HOSPITAL | Age: 73
End: 2023-12-01
Payer: MEDICARE

## 2023-12-01 DIAGNOSIS — H40.023 OPEN ANGLE WITH BORDERLINE FINDINGS AND HIGH GLAUCOMA RISK IN BOTH EYES: ICD-10-CM

## 2023-12-01 RX ORDER — DORZOLAMIDE HYDROCHLORIDE AND TIMOLOL MALEATE 20; 5 MG/ML; MG/ML
1 SOLUTION/ DROPS OPHTHALMIC 2 TIMES DAILY
COMMUNITY
End: 2023-12-01 | Stop reason: SDUPTHER

## 2023-12-01 RX ORDER — DORZOLAMIDE HYDROCHLORIDE AND TIMOLOL MALEATE 20; 5 MG/ML; MG/ML
1 SOLUTION/ DROPS OPHTHALMIC 2 TIMES DAILY
Qty: 10 ML | Refills: 1 | Status: SHIPPED | OUTPATIENT
Start: 2023-12-01 | End: 2024-03-26

## 2023-12-01 NOTE — TELEPHONE ENCOUNTER
Spoke with patient about arrival time @ 1200.   Colon/Suprep    Prep instructions reviewed: the day before the procedure, follow a clear liquid diet all day, then start the first 1/2 of prep at 5pm and take 2nd 1/2 of prep @ 0700.  Pt must be completely NPO when prep completed @ 0900.              Medications: Do not take Insulin or oral diabetic medications the day of the procedure.  Take as prescribed: heart, seizure and blood pressure medication in the morning with a sip of water (less than an ounce).  Take any breathing medications and bring inhalers to hospital with you Leave all valuables and jewelry at home.     Wear comfortable clothes to procedure to change into hospital gown You cannot drive for 24 hours after your procedure because you will receive sedation for your procedure to make you comfortable.  A ride must be provided at discharge.

## 2023-12-04 ENCOUNTER — LAB VISIT (OUTPATIENT)
Dept: LAB | Facility: HOSPITAL | Age: 73
End: 2023-12-04
Attending: FAMILY MEDICINE
Payer: MEDICARE

## 2023-12-04 DIAGNOSIS — I70.0 AORTIC ATHEROSCLEROSIS: ICD-10-CM

## 2023-12-04 DIAGNOSIS — Z13.6 ENCOUNTER FOR SCREENING FOR CARDIOVASCULAR DISORDERS: ICD-10-CM

## 2023-12-04 DIAGNOSIS — R73.09 ELEVATED HEMOGLOBIN A1C: ICD-10-CM

## 2023-12-04 DIAGNOSIS — Z00.00 ROUTINE GENERAL MEDICAL EXAMINATION AT A HEALTH CARE FACILITY: ICD-10-CM

## 2023-12-04 DIAGNOSIS — Z12.31 ENCOUNTER FOR SCREENING MAMMOGRAM FOR BREAST CANCER: ICD-10-CM

## 2023-12-04 LAB
ALBUMIN SERPL BCP-MCNC: 3.8 G/DL (ref 3.5–5.2)
ALP SERPL-CCNC: 85 U/L (ref 55–135)
ALT SERPL W/O P-5'-P-CCNC: 17 U/L (ref 10–44)
ANION GAP SERPL CALC-SCNC: 2 MMOL/L (ref 8–16)
AST SERPL-CCNC: 20 U/L (ref 10–40)
BASOPHILS # BLD AUTO: 0.03 K/UL (ref 0–0.2)
BASOPHILS NFR BLD: 0.5 % (ref 0–1.9)
BILIRUB SERPL-MCNC: 0.5 MG/DL (ref 0.1–1)
BUN SERPL-MCNC: 14 MG/DL (ref 8–23)
CALCIUM SERPL-MCNC: 9.6 MG/DL (ref 8.7–10.5)
CHLORIDE SERPL-SCNC: 108 MMOL/L (ref 95–110)
CHOLEST SERPL-MCNC: 225 MG/DL (ref 120–199)
CHOLEST/HDLC SERPL: 2.9 {RATIO} (ref 2–5)
CO2 SERPL-SCNC: 29 MMOL/L (ref 23–29)
CREAT SERPL-MCNC: 0.8 MG/DL (ref 0.5–1.4)
DIFFERENTIAL METHOD: ABNORMAL
EOSINOPHIL # BLD AUTO: 0.1 K/UL (ref 0–0.5)
EOSINOPHIL NFR BLD: 1.2 % (ref 0–8)
ERYTHROCYTE [DISTWIDTH] IN BLOOD BY AUTOMATED COUNT: 14.2 % (ref 11.5–14.5)
EST. GFR  (NO RACE VARIABLE): >60 ML/MIN/1.73 M^2
ESTIMATED AVG GLUCOSE: 103 MG/DL (ref 68–131)
GLUCOSE SERPL-MCNC: 96 MG/DL (ref 70–110)
HBA1C MFR BLD: 5.2 % (ref 4–5.6)
HCT VFR BLD AUTO: 43.8 % (ref 37–48.5)
HDLC SERPL-MCNC: 77 MG/DL (ref 40–75)
HDLC SERPL: 34.2 % (ref 20–50)
HGB BLD-MCNC: 14.4 G/DL (ref 12–16)
IMM GRANULOCYTES # BLD AUTO: 0.01 K/UL (ref 0–0.04)
IMM GRANULOCYTES NFR BLD AUTO: 0.2 % (ref 0–0.5)
LDLC SERPL CALC-MCNC: 136.8 MG/DL (ref 63–159)
LYMPHOCYTES # BLD AUTO: 1.5 K/UL (ref 1–4.8)
LYMPHOCYTES NFR BLD: 25.9 % (ref 18–48)
MCH RBC QN AUTO: 31.4 PG (ref 27–31)
MCHC RBC AUTO-ENTMCNC: 32.9 G/DL (ref 32–36)
MCV RBC AUTO: 95 FL (ref 82–98)
MONOCYTES # BLD AUTO: 0.4 K/UL (ref 0.3–1)
MONOCYTES NFR BLD: 6.9 % (ref 4–15)
NEUTROPHILS # BLD AUTO: 3.8 K/UL (ref 1.8–7.7)
NEUTROPHILS NFR BLD: 65.3 % (ref 38–73)
NONHDLC SERPL-MCNC: 148 MG/DL
NRBC BLD-RTO: 0 /100 WBC
PLATELET # BLD AUTO: 198 K/UL (ref 150–450)
PMV BLD AUTO: 10.8 FL (ref 9.2–12.9)
POTASSIUM SERPL-SCNC: 4.7 MMOL/L (ref 3.5–5.1)
PROT SERPL-MCNC: 7.6 G/DL (ref 6–8.4)
RBC # BLD AUTO: 4.59 M/UL (ref 4–5.4)
SODIUM SERPL-SCNC: 139 MMOL/L (ref 136–145)
TRIGL SERPL-MCNC: 56 MG/DL (ref 30–150)
TSH SERPL DL<=0.005 MIU/L-ACNC: 0.89 UIU/ML (ref 0.4–4)
WBC # BLD AUTO: 5.83 K/UL (ref 3.9–12.7)

## 2023-12-04 PROCEDURE — 36415 COLL VENOUS BLD VENIPUNCTURE: CPT | Performed by: FAMILY MEDICINE

## 2023-12-04 PROCEDURE — 84443 ASSAY THYROID STIM HORMONE: CPT | Performed by: FAMILY MEDICINE

## 2023-12-04 PROCEDURE — 85025 COMPLETE CBC W/AUTO DIFF WBC: CPT | Performed by: FAMILY MEDICINE

## 2023-12-04 PROCEDURE — 83036 HEMOGLOBIN GLYCOSYLATED A1C: CPT | Performed by: FAMILY MEDICINE

## 2023-12-04 PROCEDURE — 80061 LIPID PANEL: CPT | Performed by: FAMILY MEDICINE

## 2023-12-04 PROCEDURE — 80053 COMPREHEN METABOLIC PANEL: CPT | Performed by: FAMILY MEDICINE

## 2023-12-05 ENCOUNTER — ANESTHESIA (OUTPATIENT)
Dept: ENDOSCOPY | Facility: HOSPITAL | Age: 73
End: 2023-12-05
Payer: MEDICARE

## 2023-12-05 ENCOUNTER — ANESTHESIA EVENT (OUTPATIENT)
Dept: ENDOSCOPY | Facility: HOSPITAL | Age: 73
End: 2023-12-05
Payer: MEDICARE

## 2023-12-05 ENCOUNTER — HOSPITAL ENCOUNTER (OUTPATIENT)
Facility: HOSPITAL | Age: 73
Discharge: HOME | End: 2023-12-05
Attending: INTERNAL MEDICINE | Admitting: INTERNAL MEDICINE
Payer: MEDICARE

## 2023-12-05 VITALS
WEIGHT: 170 LBS | RESPIRATION RATE: 18 BRPM | HEART RATE: 54 BPM | TEMPERATURE: 98 F | DIASTOLIC BLOOD PRESSURE: 57 MMHG | SYSTOLIC BLOOD PRESSURE: 118 MMHG | OXYGEN SATURATION: 100 % | BODY MASS INDEX: 27.32 KG/M2 | HEIGHT: 66 IN

## 2023-12-05 DIAGNOSIS — Z86.010 HISTORY OF COLON POLYPS: ICD-10-CM

## 2023-12-05 PROCEDURE — 63600175 PHARM REV CODE 636 W HCPCS: Performed by: NURSE ANESTHETIST, CERTIFIED REGISTERED

## 2023-12-05 PROCEDURE — G0105 COLORECTAL SCRN; HI RISK IND: HCPCS | Performed by: INTERNAL MEDICINE

## 2023-12-05 PROCEDURE — D9220A PRA ANESTHESIA: Mod: ANES,,, | Performed by: ANESTHESIOLOGY

## 2023-12-05 PROCEDURE — 37000008 HC ANESTHESIA 1ST 15 MINUTES: Performed by: INTERNAL MEDICINE

## 2023-12-05 PROCEDURE — D9220A PRA ANESTHESIA: Mod: CRNA,,, | Performed by: NURSE ANESTHETIST, CERTIFIED REGISTERED

## 2023-12-05 PROCEDURE — D9220A PRA ANESTHESIA: ICD-10-PCS | Mod: CRNA,,, | Performed by: NURSE ANESTHETIST, CERTIFIED REGISTERED

## 2023-12-05 PROCEDURE — 25000003 PHARM REV CODE 250: Performed by: INTERNAL MEDICINE

## 2023-12-05 PROCEDURE — 37000009 HC ANESTHESIA EA ADD 15 MINS: Performed by: INTERNAL MEDICINE

## 2023-12-05 PROCEDURE — G0105 COLORECTAL SCRN; HI RISK IND: HCPCS | Mod: ,,, | Performed by: INTERNAL MEDICINE

## 2023-12-05 PROCEDURE — D9220A PRA ANESTHESIA: ICD-10-PCS | Mod: ANES,,, | Performed by: ANESTHESIOLOGY

## 2023-12-05 PROCEDURE — G0105 COLORECTAL SCRN; HI RISK IND: ICD-10-PCS | Mod: ,,, | Performed by: INTERNAL MEDICINE

## 2023-12-05 PROCEDURE — 25000003 PHARM REV CODE 250: Performed by: NURSE ANESTHETIST, CERTIFIED REGISTERED

## 2023-12-05 RX ORDER — PROPOFOL 10 MG/ML
VIAL (ML) INTRAVENOUS CONTINUOUS PRN
Status: DISCONTINUED | OUTPATIENT
Start: 2023-12-05 | End: 2023-12-05

## 2023-12-05 RX ORDER — PROPOFOL 10 MG/ML
VIAL (ML) INTRAVENOUS
Status: DISCONTINUED | OUTPATIENT
Start: 2023-12-05 | End: 2023-12-05

## 2023-12-05 RX ORDER — SODIUM CHLORIDE 0.9 % (FLUSH) 0.9 %
10 SYRINGE (ML) INJECTION
Status: CANCELLED | OUTPATIENT
Start: 2023-12-05

## 2023-12-05 RX ORDER — SODIUM CHLORIDE 9 MG/ML
INJECTION, SOLUTION INTRAVENOUS CONTINUOUS
Status: DISCONTINUED | OUTPATIENT
Start: 2023-12-05 | End: 2023-12-05 | Stop reason: HOSPADM

## 2023-12-05 RX ORDER — LIDOCAINE HYDROCHLORIDE 20 MG/ML
INJECTION INTRAVENOUS
Status: DISCONTINUED | OUTPATIENT
Start: 2023-12-05 | End: 2023-12-05

## 2023-12-05 RX ADMIN — SODIUM CHLORIDE: 9 INJECTION, SOLUTION INTRAVENOUS at 12:12

## 2023-12-05 RX ADMIN — LIDOCAINE HYDROCHLORIDE 75 MG: 20 INJECTION, SOLUTION INTRAVENOUS at 01:12

## 2023-12-05 RX ADMIN — PROPOFOL 50 MG: 10 INJECTION, EMULSION INTRAVENOUS at 01:12

## 2023-12-05 RX ADMIN — PROPOFOL 150 MCG/KG/MIN: 10 INJECTION, EMULSION INTRAVENOUS at 01:12

## 2023-12-05 NOTE — LETTER
October 4, 2023    Huber KIKI Jean  [address] [facility]  [place] [address]                [address] [place] [address]  [place] [address]  Phone: [phone]  Fax: [phone] Dear MsBaljit Pena:    We have attempted to contact you to schedule a screening colonoscopy that was ordered by your doctor. Please contact the office to schedule at [phone].       If you have any questions or concerns, please don't hesitate to call.    Sincerely,        Shannon Bah MA

## 2023-12-05 NOTE — H&P
Short Stay Endoscopy History and Physical    PCP - Ney Clayton MD    Procedure - Colonoscopy  ASA - II  Mallampati - per anesthesia  History of Anesthesia problems - no  Family history Anesthesia problems - no     HPI:  This is a 73 y.o. female here for evaluation of : Colon cancer screening    Family history of colon cancer - no  History of polyps - na  Change in bowel habits - no  Rectal bleeding - no      ROS:  Constitutional: No fevers, chills, No weight loss  ENT: No allergies  CV: No chest pain  Pulm: No shortness of breath  GI: see HPI  Derm: No rash    Medical History:  has a past medical history of Anxiety, Cataract, Chronic constipation, DDD (degenerative disc disease), lumbar, Fibroid tumor, Glaucoma, Hyperplastic colon polyp, Osteoarthritis of left knee, Osteopenia, and Pes planus.    Surgical History:  has a past surgical history that includes Breast reduction (2004); Tubal ligation; Percutaneous cryotherapy of peripheral nerve using liquid nitrous oxide in closed needle device (Left, 8/23/2018); Total knee arthroplasty (Left); Colonoscopy (N/A, 10/10/2018); Total Reduction Mammoplasty; and Breast cyst excision.    Family History: family history includes Allergies in her brother; Anxiety disorder in her daughter and son; Breast cancer in her maternal aunt and sister; Colon cancer in her cousin; Depression in her daughter; Diabetes in her mother and sister; Hypertension in her brother; No Known Problems in her brother, brother, daughter, father, maternal grandfather, maternal grandmother, maternal uncle, paternal aunt, paternal grandfather, paternal grandmother, paternal uncle, sister, and sister; Seizures in her brother; Stroke in her brother.. Otherwise no colon cancer, inflammatory bowel disease, or GI malignancies.    Social History:  reports that she has never smoked. She has never used smokeless tobacco. She reports that she does not drink alcohol and does not use drugs.    Review of  patient's allergies indicates:   Allergen Reactions    Benadryl [diphenhydramine hcl]      Patient states she was in her 20's and had some type of reaction but does not remember exactly what it was.      Pcn [penicillins]      Stomach issues        Medications:   Medications Prior to Admission   Medication Sig Dispense Refill Last Dose    dorzolamide-timolol 2-0.5% (COSOPT) 22.3-6.8 mg/mL ophthalmic solution INSTILL 1 DROP INTO BOTH EYES TWICE A DAY 30 mL 3 12/4/2023    dorzolamide-timolol 2-0.5% (COSOPT) 22.3-6.8 mg/mL ophthalmic solution Place 1 drop into both eyes 2 (two) times daily. 10 mL 1 12/4/2023    nystatin (MYCOSTATIN) powder Apply topically as needed.   Past Month    clindamycin (CLEOCIN) 150 MG capsule Take 300 mg by mouth as needed.   More than a month    clotrimazole-betamethasone 1-0.05% (LOTRISONE) cream Apply topically 2 (two) times daily. 15 g 1     hydroquinone 4 % Crea Apply topically 2 (two) times daily. 28.35 g 2          Objective Findings:    Vital Signs: see nursing notes  Physical Exam:  General Appearance: Well appearing in no acute distress  Eyes:    No scleral icterus  ENT: Neck supple  Lungs: CTA anteriorly  Heart:  S1, S2 normal, no murmurs heard  Abdomen: Soft, non tender, non distended with positive bowel sounds. No hepatosplenomegaly, ascites, or mass  Extremities: no edema  Skin: No rash      Labs:  Lab Results   Component Value Date    WBC 5.83 12/04/2023    HGB 14.4 12/04/2023    HCT 43.8 12/04/2023     12/04/2023    CHOL 225 (H) 12/04/2023    TRIG 56 12/04/2023    HDL 77 (H) 12/04/2023    ALT 17 12/04/2023    AST 20 12/04/2023     12/04/2023    K 4.7 12/04/2023     12/04/2023    CREATININE 0.8 12/04/2023    BUN 14 12/04/2023    CO2 29 12/04/2023    TSH 0.891 12/04/2023    INR 1.0 07/30/2018    HGBA1C 5.2 12/04/2023       I have explained the risks and benefits of endoscopy procedures to the patient including but not limited to bleeding, perforation,  infection, and death.    Fernandez Little MD

## 2023-12-05 NOTE — [employer] PATIENT INSTRUCTIONS
Discharge Summary/Instructions after an Endoscopic Procedure  Patient Name: Huber Pena  Patient MRN: [id number]  Patient YOB: 1950 Tuesday, December 5, 2023  Fernandez Little MD  Dear patient,  As a result of recent federal legislation (The Federal Cures Act), you may   receive lab or pathology results from your procedure in your [facility]   account before your physician is able to contact you. Your physician or   their representative will relay the results to you with their   recommendations at their soonest availability.  Thank you,  Your health is very important to us during the Covid Crisis. Following your   procedure today, you will receive a daily text for 2 weeks asking about   signs or symptoms of Covid 19.  Please respond to this text when you   receive it so we can follow up and keep you as safe as possible.   RESTRICTIONS:  During your procedure today, you received medications for sedation.  These   medications may affect your judgment, balance and coordination.  Therefore,   for 24 hours, you have the following restrictions:   - DO NOT drive a car, operate machinery, make legal/financial decisions,   sign important papers or drink alcohol.    ACTIVITY:  Today: no heavy lifting, straining or running due to procedural   sedation/anesthesia.  The following day: return to full activity including work.  DIET:  Eat and drink normally unless instructed otherwise.     TREATMENT FOR COMMON SIDE EFFECTS:  - Mild abdominal pain, nausea, belching, bloating or excessive gas:  rest,   eat lightly and use a heating pad.  - Sore Throat: treat with throat lozenges and/or gargle with warm salt   water.  - Because air was used during the procedure, expelling large amounts of air   from your rectum or belching is normal.  - If a bowel prep was taken, you may not have a bowel movement for 1-3 days.    This is normal.  SYMPTOMS TO WATCH FOR AND REPORT TO YOUR PHYSICIAN:  1. Abdominal pain or bloating,  other than gas cramps.  2. Chest pain.  3. Back pain.  4. Signs of infection such as: chills or fever occurring within 24 hours   after the procedure.  5. Rectal bleeding, which would show as bright red, maroon, or black stools.   (A tablespoon of blood from the rectum is not serious, especially if   hemorrhoids are present.)  6. Vomiting.  7. Weakness or dizziness.  GO DIRECTLY TO THE NEAREST EMERGENCY ROOM IF YOU HAVE ANY OF THE FOLLOWING:      Difficulty breathing              Chills and/or fever over 101 F   Persistent vomiting and/or vomiting blood   Severe abdominal pain   Severe chest pain   Black, tarry stools   Bleeding- more than one tablespoon   Any other symptom or condition that you feel may need urgent attention  Your doctor recommends these additional instructions:  If any biopsies were taken, your doctors clinic will contact you in 1 to 2   weeks with any results.  - Discharge patient to home.   - Resume previous diet.   - Continue present medications.   - Repeat colonoscopy in 5 years for surveillance.   - Patient has a contact number available for emergencies.  The signs and   symptoms of potential delayed complications were discussed with the   patient.  Return to normal activities tomorrow.  Written discharge   instructions were provided to the patient.  For questions, problems or results please call your physician - Fernandez Little MD.  EMERGENCY PHONE NUMBER: [phone],  LAB RESULTS: [phone]  IF A COMPLICATION OR EMERGENCY SITUATION ARISES AND YOU ARE UNABLE TO REACH   YOUR PHYSICIAN - GO DIRECTLY TO THE EMERGENCY ROOM.  Fernandez Little MD  12/5/2023 1:25:13 PM  This report has been verified and signed electronically.  Dear patient,  As a result of recent federal legislation (The Federal Cures Act), you may   receive lab or pathology results from your procedure in your [facility]   account before your physician is able to contact you. Your physician or   their  representative will relay the results to you with their   recommendations at their soonest availability.  Thank you,  [employer]

## 2023-12-05 NOTE — ANESTHESIA PREPROCEDURE EVALUATION
12/05/2023  Huber Pena is a 73 y.o., female.      Pre-op Assessment     I have reviewed the Nursing Notes.    I have reviewed the Medications.     Review of Systems  Anesthesia Hx:  No problems with previous Anesthesia             Denies Family Hx of Anesthesia complications.     Social:  Non-Smoker, No Alcohol Use       Hematology/Oncology:  Hematology Normal   Oncology Normal                                   EENT/Dental:  EENT/Dental Normal           Cardiovascular:  Cardiovascular Normal Exercise tolerance: good                                           Pulmonary:  Pulmonary Normal                       Renal/:  Renal/ Normal                 Hepatic/GI:  Hepatic/GI Normal                 Musculoskeletal:  Arthritis        Arthritis          Neurological:  Neurology Normal              Arthritis                           Endocrine:  Endocrine Normal                Physical Exam  General: Well nourished    Airway:  Mallampati: II / II  Mouth Opening: Normal  TM Distance: Normal  Tongue: Normal  Neck ROM: Normal ROM    Dental:  Intact        Anesthesia Plan  Type of Anesthesia, risks & benefits discussed:    Anesthesia Type: Gen Natural Airway  Intra-op Monitoring Plan: Standard ASA Monitors  Post Op Pain Control Plan: multimodal analgesia  Induction:  IV  Airway Plan: Direct, Post-Induction  Informed Consent: Informed consent signed with the Patient and all parties understand the risks and agree with anesthesia plan.  All questions answered.   ASA Score: 2    Ready For Surgery From Anesthesia Perspective.     .

## 2023-12-05 NOTE — TRANSFER OF CARE
"Anesthesia Transfer of Care Note    Patient: Huber Pena    Procedure(s) Performed: Procedure(s) (LRB):  COLONOSCOPY (N/A)    Patient location: PACU    Anesthesia Type: general    Transport from OR: Transported from OR on room air with adequate spontaneous ventilation    Post pain: adequate analgesia    Post assessment: no apparent anesthetic complications and tolerated procedure well    Post vital signs: stable    Level of consciousness: sedated    Nausea/Vomiting: no nausea/vomiting    Complications: none    Transfer of care protocol was followed      Last vitals: Visit Vitals  BP (!) 162/79 (BP Location: Left arm, Patient Position: Lying)   Pulse 64   Temp 36.9 °C (98.4 °F) (Temporal)   Resp 18   Ht 5' 6" (1.676 m)   Wt 77.1 kg (170 lb)   LMP  (LMP Unknown)   SpO2 99%   Breastfeeding No   BMI 27.44 kg/m²     "

## 2023-12-08 NOTE — ANESTHESIA POSTPROCEDURE EVALUATION
Anesthesia Post Evaluation    Patient: Huber Pena    Procedure(s) Performed: Procedure(s) (LRB):  COLONOSCOPY (N/A)    Final Anesthesia Type: general      Patient location during evaluation: GI PACU  Patient participation: Yes- Able to Participate  Level of consciousness: awake and alert  Post-procedure vital signs: reviewed and stable  Pain management: adequate  Airway patency: patent    PONV status at discharge: No PONV  Anesthetic complications: no      Cardiovascular status: blood pressure returned to baseline and hemodynamically stable  Respiratory status: unassisted  Hydration status: euvolemic  Follow-up not needed.              Vitals Value Taken Time   /57 12/05/23 1354   Temp 36.6 °C (97.9 °F) 12/05/23 1324   Pulse 54 12/05/23 1354   Resp 18 12/05/23 1354   SpO2 100 % 12/05/23 1354         Event Time   Out of Recovery 14:01:51         Pain/Phil Score: No data recorded

## 2023-12-11 ENCOUNTER — OFFICE VISIT (OUTPATIENT)
Dept: FAMILY MEDICINE | Facility: CLINIC | Age: 73
End: 2023-12-11
Payer: MEDICARE

## 2023-12-11 VITALS
TEMPERATURE: 98 F | HEIGHT: 66 IN | HEART RATE: 62 BPM | SYSTOLIC BLOOD PRESSURE: 130 MMHG | BODY MASS INDEX: 27 KG/M2 | WEIGHT: 168 LBS | OXYGEN SATURATION: 97 % | DIASTOLIC BLOOD PRESSURE: 66 MMHG

## 2023-12-11 DIAGNOSIS — Z00.00 ROUTINE GENERAL MEDICAL EXAMINATION AT A HEALTH CARE FACILITY: Primary | ICD-10-CM

## 2023-12-11 DIAGNOSIS — I70.0 AORTIC ATHEROSCLEROSIS: ICD-10-CM

## 2023-12-11 PROCEDURE — 1101F PT FALLS ASSESS-DOCD LE1/YR: CPT | Mod: CPTII,S$GLB,, | Performed by: FAMILY MEDICINE

## 2023-12-11 PROCEDURE — 3008F BODY MASS INDEX DOCD: CPT | Mod: CPTII,S$GLB,, | Performed by: FAMILY MEDICINE

## 2023-12-11 PROCEDURE — 1126F PR PAIN SEVERITY QUANTIFIED, NO PAIN PRESENT: ICD-10-PCS | Mod: CPTII,S$GLB,, | Performed by: FAMILY MEDICINE

## 2023-12-11 PROCEDURE — 3075F SYST BP GE 130 - 139MM HG: CPT | Mod: CPTII,S$GLB,, | Performed by: FAMILY MEDICINE

## 2023-12-11 PROCEDURE — 3078F DIAST BP <80 MM HG: CPT | Mod: CPTII,S$GLB,, | Performed by: FAMILY MEDICINE

## 2023-12-11 PROCEDURE — 3288F FALL RISK ASSESSMENT DOCD: CPT | Mod: CPTII,S$GLB,, | Performed by: FAMILY MEDICINE

## 2023-12-11 PROCEDURE — 1126F AMNT PAIN NOTED NONE PRSNT: CPT | Mod: CPTII,S$GLB,, | Performed by: FAMILY MEDICINE

## 2023-12-11 PROCEDURE — 3044F PR MOST RECENT HEMOGLOBIN A1C LEVEL <7.0%: ICD-10-PCS | Mod: CPTII,S$GLB,, | Performed by: FAMILY MEDICINE

## 2023-12-11 PROCEDURE — 1159F PR MEDICATION LIST DOCUMENTED IN MEDICAL RECORD: ICD-10-PCS | Mod: CPTII,S$GLB,, | Performed by: FAMILY MEDICINE

## 2023-12-11 PROCEDURE — 99397 PR PREVENTIVE VISIT,EST,65 & OVER: ICD-10-PCS | Mod: S$GLB,,, | Performed by: FAMILY MEDICINE

## 2023-12-11 PROCEDURE — 99999 PR PBB SHADOW E&M-EST. PATIENT-LVL III: CPT | Mod: PBBFAC,,, | Performed by: FAMILY MEDICINE

## 2023-12-11 PROCEDURE — 1101F PR PT FALLS ASSESS DOC 0-1 FALLS W/OUT INJ PAST YR: ICD-10-PCS | Mod: CPTII,S$GLB,, | Performed by: FAMILY MEDICINE

## 2023-12-11 PROCEDURE — 1159F MED LIST DOCD IN RCRD: CPT | Mod: CPTII,S$GLB,, | Performed by: FAMILY MEDICINE

## 2023-12-11 PROCEDURE — 99397 PER PM REEVAL EST PAT 65+ YR: CPT | Mod: S$GLB,,, | Performed by: FAMILY MEDICINE

## 2023-12-11 PROCEDURE — 3288F PR FALLS RISK ASSESSMENT DOCUMENTED: ICD-10-PCS | Mod: CPTII,S$GLB,, | Performed by: FAMILY MEDICINE

## 2023-12-11 PROCEDURE — 3075F PR MOST RECENT SYSTOLIC BLOOD PRESS GE 130-139MM HG: ICD-10-PCS | Mod: CPTII,S$GLB,, | Performed by: FAMILY MEDICINE

## 2023-12-11 PROCEDURE — 3044F HG A1C LEVEL LT 7.0%: CPT | Mod: CPTII,S$GLB,, | Performed by: FAMILY MEDICINE

## 2023-12-11 PROCEDURE — 3078F PR MOST RECENT DIASTOLIC BLOOD PRESSURE < 80 MM HG: ICD-10-PCS | Mod: CPTII,S$GLB,, | Performed by: FAMILY MEDICINE

## 2023-12-11 PROCEDURE — 99999 PR PBB SHADOW E&M-EST. PATIENT-LVL III: ICD-10-PCS | Mod: PBBFAC,,, | Performed by: FAMILY MEDICINE

## 2023-12-11 PROCEDURE — 3008F PR BODY MASS INDEX (BMI) DOCUMENTED: ICD-10-PCS | Mod: CPTII,S$GLB,, | Performed by: FAMILY MEDICINE

## 2023-12-11 NOTE — PROGRESS NOTES
No chief complaint on file.        HPI: 73 y.o. female  Annual exam      Anxiety, on occasional alprazolam, rarely uses.     Left knee osteoarthritis, status post TKA in August 2018.  Overall doing well but does occasionally use meloxicam.    Osteopenia, DEXA was normal from 2018    Exercises regularly, tries to eat healthy diet     presented October 2022 with concerns about elevated blood pressure, did report a lot of stress going on with brother having a stroke and she was visiting him in the hospital, going back and forth and eating out more in cooking less at home so admitted to some poor diet, also poor sleep and anxiety.  Notice some chest heaviness at the end of the day when she would lie down, not noted with daytime and with exercising.  Her blood pressure was improved on recheck in the office, discussed impact of stress and dietary changes and discussed continued monitoring, dietary intervention, continue regular exercise.  We discussed likely no need for cardiac testing given atypical presentation for cardiogenic chest pain.  Seem to happen more when she was fatigued at the end the day, nonexertional.  We discussed with stress management and proper sleep hygiene if the symptoms did not improve in worsens, could consider further testing if needed          Past Medical History:   Diagnosis Date    Anxiety     Cataract     Chronic constipation     DDD (degenerative disc disease), lumbar     Fibroid tumor     Glaucoma     Hyperplastic colon polyp     Osteoarthritis of left knee     Osteopenia     Pes planus        Past Surgical History:   Procedure Laterality Date    BREAST CYST EXCISION      Breast reduction  2004    COLONOSCOPY N/A 10/10/2018    Procedure: COLONOSCOPY;  Surgeon: Cristal Whiteside MD;  Location: [facility];  Service: Endoscopy;  Laterality: N/A;    COLONOSCOPY N/A 12/5/2023    Procedure: COLONOSCOPY;  Surgeon: Fernandez Little MD;  Location: [facility];  Service: Endoscopy;   Laterality: N/A;    PERCUTANEOUS CRYOTHERAPY OF PERIPHERAL NERVE USING LIQUID NITROUS OXIDE IN CLOSED NEEDLE DEVICE Left 8/23/2018    Procedure: CRYOTHERAPY, NERVE, PERIPHERAL, PERCUTANEOUS, USING LIQUID NITROUS OXIDE IN CLOSED NEEDLE DEVICE;  Surgeon: FRANK Null;  Location: [facility];  Service: Orthopedics;  Laterality: Left;    TOTAL KNEE ARTHROPLASTY Left     TOTAL REDUCTION MAMMOPLASTY      TUBAL LIGATION         Family History   Problem Relation Age of Onset    Diabetes Mother     No Known Problems Father     Diabetes Sister         x2    Breast cancer Sister     No Known Problems Sister     No Known Problems Sister     Hypertension Brother     Seizures Brother     Allergies Brother     No Known Problems Brother     No Known Problems Brother     Stroke Brother     Depression Daughter     Anxiety disorder Daughter     No Known Problems Daughter     Anxiety disorder Son     Breast cancer Maternal Aunt     No Known Problems Maternal Uncle     No Known Problems Paternal Aunt     No Known Problems Paternal Uncle     No Known Problems Maternal Grandmother     No Known Problems Maternal Grandfather     No Known Problems Paternal Grandmother     No Known Problems Paternal Grandfather     Colon cancer Cousin     Amblyopia Neg Hx     Blindness Neg Hx     Cancer Neg Hx     Cataracts Neg Hx     Glaucoma Neg Hx     Macular degeneration Neg Hx     Retinal detachment Neg Hx     Strabismus Neg Hx     Thyroid disease Neg Hx     Melanoma Neg Hx        Social History     Tobacco Use    Smoking status: Never    Smokeless tobacco: Never   Substance Use Topics    Alcohol use: No     Comment: yearly on occasions    Drug use: No     Lab Results   Component Value Date    WBC 5.83 12/04/2023    HGB 14.4 12/04/2023    HCT 43.8 12/04/2023    MCV 95 12/04/2023     12/04/2023    CHOL 225 (H) 12/04/2023    TRIG 56 12/04/2023    HDL 77 (H) 12/04/2023    ALT 17 12/04/2023    AST 20 12/04/2023    BILITOT 0.5 12/04/2023    ALKPHOS  85 12/04/2023     12/04/2023    K 4.7 12/04/2023     12/04/2023    CREATININE 0.8 12/04/2023    ESTGFRAFRICA >60 12/13/2021    EGFRNONAA >60 12/13/2021    CALCIUM 9.6 12/04/2023    ALBUMIN 3.8 12/04/2023    BUN 14 12/04/2023    CO2 29 12/04/2023    TSH 0.891 12/04/2023    INR 1.0 07/30/2018    HGBA1C 5.2 12/04/2023    LDLCALC 136.8 12/04/2023    GLU 96 12/04/2023    [id number] 60 12/06/2022                Hemoglobin A1C (%)   Date Value   12/04/2023 5.2   12/09/2022 5.5   12/12/2020 5.3   03/17/2020 5.7 (H)   07/30/2018 5.4   07/26/2013 5.8       Vital signs reviewed  PE:   APPEARANCE: Well nourished, well developed, in no acute distress.    HEAD: Normocephalic, atraumatic.  EYES: PERRL. EOMI.   Conjunctivae noninjected.  EARS: TM's intact. Light reflex normal. No retraction or perforation  NOSE: Mucosa pink. Airway clear.  MOUTH & THROAT: No tonsillar enlargement. No pharyngeal erythema or exudate.   NECK: Supple with no cervical lymphadenopathy.    CHEST: Good inspiratory effort. Lungs clear to auscultation with no wheezes or crackles.  CARDIOVASCULAR: Normal S1, S2. No rubs, murmurs, or gallops.  ABDOMEN: Bowel sounds normal. Not distended. Soft. No tenderness or masses. No organomegaly.  EXTREMITIES: No edema              Impression  No diagnosis found.            Plan  Reviewed labs, stable.   Reviewed cholesterol profile.  Elevated HDL although LDL was also up from last year.  Reviewed a remote chest x-ray noted on the chart which incidentally noted a bit of atherosclerotic plaque of the aorta.  Discussed atherosclerotic disease as indicator for statin therapy for preventive measures.  Will update chest x-ray and if continued arthrosclerosis noted can consider starting Crestor   continue dietary precautions and regular exercise.  She does feel like she can modify her dietary regimen a bit.  She exercises 5 days a week already, no cardiovascular symptoms with exercise     Osteoarthritis stable.         SCREENINGS     Immunizations:  tdap 2015  Flu:   up-to-date   Pneumococcal vaccination:  [id number]  2018.  PVX 2019  COVID Up-to-date  Zoster up-to-date     Age/Gender Appropriate screenings:  Mmg scheduled  dexa 08/21/2023:  normal  Colonoscopy   12/05/2023: Normal  Pap smear 12/05/2019, Dr. Sparks

## 2023-12-12 ENCOUNTER — OFFICE VISIT (OUTPATIENT)
Dept: OBSTETRICS AND GYNECOLOGY | Facility: CLINIC | Age: 73
End: 2023-12-12
Payer: MEDICARE

## 2023-12-12 ENCOUNTER — PATIENT MESSAGE (OUTPATIENT)
Dept: FAMILY MEDICINE | Facility: CLINIC | Age: 73
End: 2023-12-12
Payer: MEDICARE

## 2023-12-12 ENCOUNTER — HOSPITAL ENCOUNTER (OUTPATIENT)
Dept: RADIOLOGY | Facility: HOSPITAL | Age: 73
Discharge: HOME | End: 2023-12-12
Attending: FAMILY MEDICINE
Payer: MEDICARE

## 2023-12-12 VITALS
HEIGHT: 66 IN | WEIGHT: 166.69 LBS | BODY MASS INDEX: 26.79 KG/M2 | SYSTOLIC BLOOD PRESSURE: 139 MMHG | DIASTOLIC BLOOD PRESSURE: 77 MMHG

## 2023-12-12 DIAGNOSIS — I70.0 AORTIC ATHEROSCLEROSIS: ICD-10-CM

## 2023-12-12 DIAGNOSIS — Z01.419 WELL WOMAN EXAM WITH ROUTINE GYNECOLOGICAL EXAM: Primary | ICD-10-CM

## 2023-12-12 DIAGNOSIS — Z01.419 ROUTINE GYNECOLOGICAL EXAMINATION: ICD-10-CM

## 2023-12-12 DIAGNOSIS — L30.4 INTERTRIGO: ICD-10-CM

## 2023-12-12 DIAGNOSIS — I70.0 AORTIC ATHEROSCLEROSIS: Primary | ICD-10-CM

## 2023-12-12 PROCEDURE — 3078F PR MOST RECENT DIASTOLIC BLOOD PRESSURE < 80 MM HG: ICD-10-PCS | Mod: CPTII,S$GLB,, | Performed by: STUDENT IN AN ORGANIZED HEALTH CARE EDUCATION/TRAINING PROGRAM

## 2023-12-12 PROCEDURE — G0101 CA SCREEN;PELVIC/BREAST EXAM: HCPCS | Mod: GZ,S$GLB,, | Performed by: STUDENT IN AN ORGANIZED HEALTH CARE EDUCATION/TRAINING PROGRAM

## 2023-12-12 PROCEDURE — 71046 XR CHEST PA AND LATERAL: ICD-10-PCS | Mod: 26,,, | Performed by: INTERNAL MEDICINE

## 2023-12-12 PROCEDURE — 1159F PR MEDICATION LIST DOCUMENTED IN MEDICAL RECORD: ICD-10-PCS | Mod: CPTII,S$GLB,, | Performed by: STUDENT IN AN ORGANIZED HEALTH CARE EDUCATION/TRAINING PROGRAM

## 2023-12-12 PROCEDURE — 3078F DIAST BP <80 MM HG: CPT | Mod: CPTII,S$GLB,, | Performed by: STUDENT IN AN ORGANIZED HEALTH CARE EDUCATION/TRAINING PROGRAM

## 2023-12-12 PROCEDURE — 1126F AMNT PAIN NOTED NONE PRSNT: CPT | Mod: CPTII,S$GLB,, | Performed by: STUDENT IN AN ORGANIZED HEALTH CARE EDUCATION/TRAINING PROGRAM

## 2023-12-12 PROCEDURE — 1101F PT FALLS ASSESS-DOCD LE1/YR: CPT | Mod: CPTII,S$GLB,, | Performed by: STUDENT IN AN ORGANIZED HEALTH CARE EDUCATION/TRAINING PROGRAM

## 2023-12-12 PROCEDURE — 3075F SYST BP GE 130 - 139MM HG: CPT | Mod: CPTII,S$GLB,, | Performed by: STUDENT IN AN ORGANIZED HEALTH CARE EDUCATION/TRAINING PROGRAM

## 2023-12-12 PROCEDURE — G0101 PR CA SCREEN;PELVIC/BREAST EXAM: ICD-10-PCS | Mod: GZ,S$GLB,, | Performed by: STUDENT IN AN ORGANIZED HEALTH CARE EDUCATION/TRAINING PROGRAM

## 2023-12-12 PROCEDURE — 99999 PR PBB SHADOW E&M-EST. PATIENT-LVL III: ICD-10-PCS | Mod: PBBFAC,,, | Performed by: STUDENT IN AN ORGANIZED HEALTH CARE EDUCATION/TRAINING PROGRAM

## 2023-12-12 PROCEDURE — 1126F PR PAIN SEVERITY QUANTIFIED, NO PAIN PRESENT: ICD-10-PCS | Mod: CPTII,S$GLB,, | Performed by: STUDENT IN AN ORGANIZED HEALTH CARE EDUCATION/TRAINING PROGRAM

## 2023-12-12 PROCEDURE — 71046 X-RAY EXAM CHEST 2 VIEWS: CPT | Mod: 26,,, | Performed by: INTERNAL MEDICINE

## 2023-12-12 PROCEDURE — 3288F FALL RISK ASSESSMENT DOCD: CPT | Mod: CPTII,S$GLB,, | Performed by: STUDENT IN AN ORGANIZED HEALTH CARE EDUCATION/TRAINING PROGRAM

## 2023-12-12 PROCEDURE — 3044F HG A1C LEVEL LT 7.0%: CPT | Mod: CPTII,S$GLB,, | Performed by: STUDENT IN AN ORGANIZED HEALTH CARE EDUCATION/TRAINING PROGRAM

## 2023-12-12 PROCEDURE — 3288F PR FALLS RISK ASSESSMENT DOCUMENTED: ICD-10-PCS | Mod: CPTII,S$GLB,, | Performed by: STUDENT IN AN ORGANIZED HEALTH CARE EDUCATION/TRAINING PROGRAM

## 2023-12-12 PROCEDURE — 1101F PR PT FALLS ASSESS DOC 0-1 FALLS W/OUT INJ PAST YR: ICD-10-PCS | Mod: CPTII,S$GLB,, | Performed by: STUDENT IN AN ORGANIZED HEALTH CARE EDUCATION/TRAINING PROGRAM

## 2023-12-12 PROCEDURE — 1159F MED LIST DOCD IN RCRD: CPT | Mod: CPTII,S$GLB,, | Performed by: STUDENT IN AN ORGANIZED HEALTH CARE EDUCATION/TRAINING PROGRAM

## 2023-12-12 PROCEDURE — 99999 PR PBB SHADOW E&M-EST. PATIENT-LVL III: CPT | Mod: PBBFAC,,, | Performed by: STUDENT IN AN ORGANIZED HEALTH CARE EDUCATION/TRAINING PROGRAM

## 2023-12-12 PROCEDURE — 3075F PR MOST RECENT SYSTOLIC BLOOD PRESS GE 130-139MM HG: ICD-10-PCS | Mod: CPTII,S$GLB,, | Performed by: STUDENT IN AN ORGANIZED HEALTH CARE EDUCATION/TRAINING PROGRAM

## 2023-12-12 PROCEDURE — 3044F PR MOST RECENT HEMOGLOBIN A1C LEVEL <7.0%: ICD-10-PCS | Mod: CPTII,S$GLB,, | Performed by: STUDENT IN AN ORGANIZED HEALTH CARE EDUCATION/TRAINING PROGRAM

## 2023-12-12 PROCEDURE — 71046 X-RAY EXAM CHEST 2 VIEWS: CPT | Mod: TC,FY

## 2023-12-12 RX ORDER — ROSUVASTATIN CALCIUM 10 MG/1
10 TABLET, COATED ORAL DAILY
Qty: 90 TABLET | Refills: 3 | Status: SHIPPED | OUTPATIENT
Start: 2023-12-12 | End: 2024-12-11

## 2023-12-12 RX ORDER — CLOTRIMAZOLE AND BETAMETHASONE DIPROPIONATE 10; .64 MG/G; MG/G
CREAM TOPICAL 2 TIMES DAILY
Qty: 15 G | Refills: 1 | Status: SHIPPED | OUTPATIENT
Start: 2023-12-12

## 2023-12-12 RX ORDER — NYSTATIN 100000 [USP'U]/G
POWDER TOPICAL
Qty: 60 G | Refills: 1 | Status: SHIPPED | OUTPATIENT
Start: 2023-12-12

## 2023-12-12 NOTE — TELEPHONE ENCOUNTER
Labs 3 months, starting Crestor 10 mg daily  Orders Placed This Encounter   Procedures    Comprehensive Metabolic Panel    Lipid Panel

## 2023-12-12 NOTE — PROGRESS NOTES
CC: Well woman exam    HPI:  Huber Pena is a 73 y.o. female  presents for a well woman exam.  She has no major issues, problems, or complaints. Has recurrent groin rash that is well treated with nystatin powder and topical steroid cream. Notices more with use of products other than Aveeno, feels like the skin is sensitive. Sometimes the skin is breaking down if she is scratching at night without noticing. Not currently irritating her      Patient history:   Past Medical History:   Diagnosis Date    Anxiety     Cataract     Chronic constipation     DDD (degenerative disc disease), lumbar     Fibroid tumor     Glaucoma     Hyperplastic colon polyp     Osteoarthritis of left knee     Osteopenia     Pes planus      Past Surgical History:   Procedure Laterality Date    BREAST CYST EXCISION      Breast reduction      COLONOSCOPY N/A 10/10/2018    Procedure: COLONOSCOPY;  Surgeon: Cristal Whiteside MD;  Location: [facility] ENDO;  Service: Endoscopy;  Laterality: N/A;    COLONOSCOPY N/A 2023    Procedure: COLONOSCOPY;  Surgeon: Fernandez Little MD;  Location: [facility] ENDO;  Service: Endoscopy;  Laterality: N/A;    PERCUTANEOUS CRYOTHERAPY OF PERIPHERAL NERVE USING LIQUID NITROUS OXIDE IN CLOSED NEEDLE DEVICE Left 2018    Procedure: CRYOTHERAPY, NERVE, PERIPHERAL, PERCUTANEOUS, USING LIQUID NITROUS OXIDE IN CLOSED NEEDLE DEVICE;  Surgeon: FRANK Null;  Location: [facility] OR;  Service: Orthopedics;  Laterality: Left;    TOTAL KNEE ARTHROPLASTY Left     TOTAL REDUCTION MAMMOPLASTY      TUBAL LIGATION       OB History    Para Term  AB Living   4 3 3   1 3   SAB IAB Ectopic Multiple Live Births           3      # Outcome Date GA Lbr Dejuan/2nd Weight Sex Delivery Anes PTL Lv   4 Term 86 40w0d   M Vag-Spont EPI  HUSSEIN   3 Term 78 40w0d   F Vag-Spont EPI  HUSSEIN   2 Term 73 40w0d   F Vag-Spont None  HUSSEIN   1 AB                GYN  Menopausal: Yes  History of abnormal paps:  DENIES  Abnormal or postmenopausal bleeding: DENIES  History of abnormal mammograms:DENIES   Family history of breast or ovarian cancer: DENIES  Any breast masses, pain, skin changes, or nipple discharge: DENIES  Possible recent STD exposure: denies  Contraception: N/A    Pap: No result found, >65 and no history of high grade dysplasia  Mammogram: BIRADS1, T-C=8.27% 12/2022, ordered for 2023      Family History   Problem Relation Age of Onset    Diabetes Mother     No Known Problems Father     Diabetes Sister         x2    Breast cancer Sister     No Known Problems Sister     No Known Problems Sister     Hypertension Brother     Seizures Brother     Allergies Brother     No Known Problems Brother     No Known Problems Brother     Stroke Brother     Depression Daughter     Anxiety disorder Daughter     No Known Problems Daughter     Anxiety disorder Son     Breast cancer Maternal Aunt     No Known Problems Maternal Uncle     No Known Problems Paternal Aunt     No Known Problems Paternal Uncle     No Known Problems Maternal Grandmother     No Known Problems Maternal Grandfather     No Known Problems Paternal Grandmother     No Known Problems Paternal Grandfather     Colon cancer Cousin     Amblyopia Neg Hx     Blindness Neg Hx     Cancer Neg Hx     Cataracts Neg Hx     Glaucoma Neg Hx     Macular degeneration Neg Hx     Retinal detachment Neg Hx     Strabismus Neg Hx     Thyroid disease Neg Hx     Melanoma Neg Hx      Social History     Tobacco Use    Smoking status: Never    Smokeless tobacco: Never   Substance Use Topics    Alcohol use: No     Comment: yearly on occasions    Drug use: No     Allergies: Benadryl [diphenhydramine hcl] and Pcn [penicillins]    Current Outpatient Medications:     clindamycin (CLEOCIN) 150 MG capsule, Take 300 mg by mouth as needed., Disp: , Rfl:     dorzolamide-timolol 2-0.5% (COSOPT) 22.3-6.8 mg/mL ophthalmic solution, INSTILL 1 DROP INTO BOTH EYES TWICE A DAY, Disp: 30 mL, Rfl: 3     "dorzolamide-timolol 2-0.5% (COSOPT) 22.3-6.8 mg/mL ophthalmic solution, Place 1 drop into both eyes 2 (two) times daily., Disp: 10 mL, Rfl: 1    hydroquinone 4 % Crea, Apply topically 2 (two) times daily., Disp: 28.35 g, Rfl: 2    clotrimazole-betamethasone 1-0.05% (LOTRISONE) cream, Apply topically 2 (two) times daily., Disp: 15 g, Rfl: 1    nystatin (MYCOSTATIN) powder, Apply topically as needed., Disp: 60 g, Rfl: 1       ROS:  GENERAL: Denies weight gain or weight loss. Feeling well overall.   SKIN: Denies rash or lesions.   HEAD: Denies head injury or headache.   NODES: Denies enlarged lymph nodes.   CHEST: Denies chest pain or shortness of breath.   CARDIOVASCULAR: Denies palpitations or left sided chest pain.   ABDOMEN: No abdominal pain, constipation, diarrhea, nausea, vomiting or rectal bleeding.   URINARY: No frequency, dysuria, hematuria, or burning on urination.  REPRODUCTIVE: See HPI.   BREASTS: The patient performs breast self-examination and denies pain, lumps, or nipple discharge.   HEMATOLOGIC: No easy bruisability or excessive bleeding.  MUSCULOSKELETAL: Denies joint pain or swelling.   NEUROLOGIC: Denies syncope or weakness.   PSYCHIATRIC: Denies depression, anxiety or mood swings.    Objective:   /77   Ht 5' 6" (1.676 m)   Wt 75.6 kg (166 lb 10.7 oz)   LMP  (LMP Unknown)   BMI 26.90 kg/m²       Physical Exam:  APPEARANCE: Well nourished, well developed, in no acute distress.  AFFECT: WNL, alert and oriented x 3  SKIN: No acne or hirsutism  NECK: Neck symmetric without masses or thyromegaly  CHEST: Good respiratory effect  ABDOMEN: Soft.  No tenderness or masses.  No hepatosplenomegaly.  No hernias.  BREASTS: Symmetrical, no skin changes or visible lesions.  No palpable masses, nipple discharge bilaterally.  PELVIC: No inguinal fold rash today but skin is slightly darker/thicker on right side, suggesting chronic skin changes. Normal external genitalia without lesions.  Normal hair " distribution.  Adequate perineal body, normal urethral meatus.  Vagina atrophic without lesions or discharge.  Cervix pink, without lesions, discharge or tenderness.  No significant cystocele or rectocele.  Bimanual exam shows uterus to be normal size, regular, mobile and nontender.  Adnexa without masses or tenderness.   EXTREMITIES: No edema.    ASSESSMENT AND PLAN  1. Well woman exam with routine gynecological exam        2. Routine gynecological examination  Ambulatory referral/consult to Obstetrics / Gynecology      3. Intertrigo  nystatin (MYCOSTATIN) powder    clotrimazole-betamethasone 1-0.05% (LOTRISONE) cream          Annual exam  Breast and pelvic exam: wnl  Patient counseled on ASCCP guidelines for cervical cytology screening  Cervical screening: not indicated, >66yo with no history of high grade dysplasia  Patient counseled on current recommendations for breast cancer screening  Mammogram screening: due this month, ordered  STD testing: not requested today   Osteoporosis screening completed 8/2023, normal   Tobacco cessation counseling n/a  Discussed groin rash likely intertrigo based on history/location, refills PRN meds as requested since works well for her for treatment     She was counseled to follow up with her PCP for other routine health maintenance      Follow up in about 1 year (around 12/12/2024).      Claudia Solares MD  OBGYN [facility]

## 2023-12-16 ENCOUNTER — HOSPITAL ENCOUNTER (OUTPATIENT)
Dept: RADIOLOGY | Facility: HOSPITAL | Age: 73
Discharge: HOME | End: 2023-12-16
Attending: FAMILY MEDICINE
Payer: MEDICARE

## 2023-12-16 DIAGNOSIS — Z12.31 ENCOUNTER FOR SCREENING MAMMOGRAM FOR BREAST CANCER: ICD-10-CM

## 2023-12-16 PROCEDURE — 77067 MAMMO DIGITAL SCREENING BILAT WITH TOMO: ICD-10-PCS | Mod: 26,,, | Performed by: RADIOLOGY

## 2023-12-16 PROCEDURE — 77063 MAMMO DIGITAL SCREENING BILAT WITH TOMO: ICD-10-PCS | Mod: 26,,, | Performed by: RADIOLOGY

## 2023-12-16 PROCEDURE — 77067 SCR MAMMO BI INCL CAD: CPT | Mod: 26,,, | Performed by: RADIOLOGY

## 2023-12-16 PROCEDURE — 77067 SCR MAMMO BI INCL CAD: CPT | Mod: TC

## 2023-12-16 PROCEDURE — 77063 BREAST TOMOSYNTHESIS BI: CPT | Mod: 26,,, | Performed by: RADIOLOGY

## 2024-01-31 ENCOUNTER — CLINICAL SUPPORT (OUTPATIENT)
Dept: OTOLARYNGOLOGY | Facility: CLINIC | Age: 74
End: 2024-01-31
Payer: MEDICARE

## 2024-01-31 ENCOUNTER — OFFICE VISIT (OUTPATIENT)
Dept: OTOLARYNGOLOGY | Facility: CLINIC | Age: 74
End: 2024-01-31
Payer: MEDICARE

## 2024-01-31 VITALS
BODY MASS INDEX: 26.37 KG/M2 | SYSTOLIC BLOOD PRESSURE: 148 MMHG | DIASTOLIC BLOOD PRESSURE: 87 MMHG | HEART RATE: 67 BPM | WEIGHT: 163.38 LBS

## 2024-01-31 DIAGNOSIS — H90.3 SENSORINEURAL HEARING LOSS (SNHL) OF BOTH EARS: Primary | ICD-10-CM

## 2024-01-31 PROCEDURE — 3008F BODY MASS INDEX DOCD: CPT | Mod: CPTII,S$GLB,, | Performed by: NURSE PRACTITIONER

## 2024-01-31 PROCEDURE — 99999 PR PBB SHADOW E&M-EST. PATIENT-LVL I: CPT | Mod: PBBFAC,,, | Performed by: PHYSICIAN ASSISTANT

## 2024-01-31 PROCEDURE — 3077F SYST BP >= 140 MM HG: CPT | Mod: CPTII,S$GLB,, | Performed by: NURSE PRACTITIONER

## 2024-01-31 PROCEDURE — 3288F FALL RISK ASSESSMENT DOCD: CPT | Mod: CPTII,S$GLB,, | Performed by: NURSE PRACTITIONER

## 2024-01-31 PROCEDURE — 1159F MED LIST DOCD IN RCRD: CPT | Mod: CPTII,S$GLB,, | Performed by: NURSE PRACTITIONER

## 2024-01-31 PROCEDURE — 92557 COMPREHENSIVE HEARING TEST: CPT | Mod: S$GLB,,, | Performed by: PHYSICIAN ASSISTANT

## 2024-01-31 PROCEDURE — 99999 PR PBB SHADOW E&M-EST. PATIENT-LVL III: CPT | Mod: PBBFAC,,, | Performed by: NURSE PRACTITIONER

## 2024-01-31 PROCEDURE — 99213 OFFICE O/P EST LOW 20 MIN: CPT | Mod: S$GLB,,, | Performed by: NURSE PRACTITIONER

## 2024-01-31 PROCEDURE — 3079F DIAST BP 80-89 MM HG: CPT | Mod: CPTII,S$GLB,, | Performed by: NURSE PRACTITIONER

## 2024-01-31 PROCEDURE — 92567 TYMPANOMETRY: CPT | Mod: S$GLB,,, | Performed by: PHYSICIAN ASSISTANT

## 2024-01-31 PROCEDURE — 1160F RVW MEDS BY RX/DR IN RCRD: CPT | Mod: CPTII,S$GLB,, | Performed by: NURSE PRACTITIONER

## 2024-01-31 PROCEDURE — 1101F PT FALLS ASSESS-DOCD LE1/YR: CPT | Mod: CPTII,S$GLB,, | Performed by: NURSE PRACTITIONER

## 2024-01-31 NOTE — PROGRESS NOTES
Huber Pena, a 73 y.o. female, was seen today in the clinic for an audiologic evaluation.  Patients main complaint was hearing loss that has been progressing over the last several years along with intermittent tinnitus.  Ms. Pena states she has difficulty understanding conversation especially in the presence of background noise.    Audiogram results revealed a mild to moderate sensorineural hearing loss bilaterally. Speech reception thresholds were noted at 15 dB in the right ear and 15 dB in the left ear.  Speech discrimination scores were 92% in the right ear and 92% in the left ear.  Tympanometry revealed Type A in the right ear and Type A in the left ear. The results of the audiogram were reviewed with the patient and the benefits of amplification were discussed.    Recommendations:  Otologic evaluation  Annual audiogram  Hearing protection when in noise  Hearing aid consultation

## 2024-01-31 NOTE — PROGRESS NOTES
Patient ID: Huber Pena is a 73 y.o. y.o. female    Chief Complaint:   Chief Complaint   Patient presents with    Cerumen Impaction    Hearing Loss       Patient is referred to me by No ref. provider found in consultation for evaluation of a possible wax impaction in bilateral ears.  she has complaints of hearing loss in the affected ears, but denies pain or drainage.  This has been an issue in the past.  The patient has been using any sort of ear drop to soften the wax.  She  reports hearing loss that has been gradually progressing over the last few years due to cerumen impaction.  She  has not noted any difference in hearing between the ears, with either ear being the better hearing ear.  She has noted any tinnitus in both ears.  She  has not had a recent issues with ear pain or ear drainage. She  has a family history of hearing loss (brother), and has not had any previous otologic surgery. She denies any history of significant loud noise exposure.        Review of Systems   Constitutional: Negative for fever, chills, fatigue and unexpected weight change.   HENT: Positive for ear blockage. Negative for hearing loss, nosebleeds, congestion, sore throat, facial swelling, rhinorrhea, sneezing, trouble swallowing, dental problem, voice change, postnasal drip, sinus pressure, tinnitus and ear discharge.    Eyes: Negative for redness, itching and visual disturbance.   Respiratory: Negative for cough, choking and wheezing.    Cardiovascular: Negative for chest pain and palpitations.   Gastrointestinal: Negative for abdominal pain.        No reflux.   Musculoskeletal: Negative for gait problem.   Skin: Negative for rash.   Neurological: Negative for dizziness, light-headedness and headaches.     Past Medical History:   Diagnosis Date    Anxiety     Cataract     Chronic constipation     DDD (degenerative disc disease), lumbar     Fibroid tumor     Glaucoma     Hyperplastic colon polyp     Osteoarthritis of left knee      Osteopenia     Pes planus      Past Surgical History:   Procedure Laterality Date    BREAST CYST EXCISION      Breast reduction  2004    COLONOSCOPY N/A 10/10/2018    Procedure: COLONOSCOPY;  Surgeon: Cristal Whiteside MD;  Location: [facility] ENDO;  Service: Endoscopy;  Laterality: N/A;    COLONOSCOPY N/A 12/5/2023    Procedure: COLONOSCOPY;  Surgeon: Fernandez Little MD;  Location: [facility] ENDO;  Service: Endoscopy;  Laterality: N/A;    PERCUTANEOUS CRYOTHERAPY OF PERIPHERAL NERVE USING LIQUID NITROUS OXIDE IN CLOSED NEEDLE DEVICE Left 8/23/2018    Procedure: CRYOTHERAPY, NERVE, PERIPHERAL, PERCUTANEOUS, USING LIQUID NITROUS OXIDE IN CLOSED NEEDLE DEVICE;  Surgeon: FRANK Null;  Location: [facility] OR;  Service: Orthopedics;  Laterality: Left;    TOTAL KNEE ARTHROPLASTY Left     TOTAL REDUCTION MAMMOPLASTY      TUBAL LIGATION       Social History     Socioeconomic History    Marital status:    Tobacco Use    Smoking status: Never    Smokeless tobacco: Never   Substance and Sexual Activity    Alcohol use: No     Comment: yearly on occasions    Drug use: No    Sexual activity: Yes     Partners: Male     Birth control/protection: Post-menopausal     Comment:      Social Determinants of Health     Financial Resource Strain: Low Risk  (12/28/2022)    Overall Financial Resource Strain (CARDIA)     Difficulty of Paying Living Expenses: Not hard at all   Food Insecurity: No Food Insecurity (12/28/2022)    Hunger Vital Sign     Worried About Running Out of Food in the Last Year: Never true     Ran Out of Food in the Last Year: Never true   Transportation Needs: No Transportation Needs (12/28/2022)    PRAPARE - Transportation     Lack of Transportation (Medical): No     Lack of Transportation (Non-Medical): No   Physical Activity: Sufficiently Active (12/28/2022)    Exercise Vital Sign     Days of Exercise per Week: 4 days     Minutes of Exercise per Session: 120 min   Stress: No Stress Concern  Present (12/28/2022)    [facility] of Occupational Health - Occupational Stress Questionnaire     Feeling of Stress : Not at all   Social Connections: Socially Integrated (12/28/2022)    Social Connection and Isolation Panel [NHANES]     Frequency of Communication with Friends and Family: More than three times a week     Frequency of Social Gatherings with Friends and Family: Twice a week     Attends Sikhism Services: More than 4 times per year     Active Member of Clubs or Organizations: Yes     Attends Club or Organization Meetings: More than 4 times per year     Marital Status:    Housing Stability: Low Risk  (12/28/2022)    Housing Stability Vital Sign     Unable to Pay for Housing in the Last Year: No     Number of Places Lived in the Last Year: 1     Unstable Housing in the Last Year: No     Family History   Problem Relation Age of Onset    Diabetes Mother     No Known Problems Father     Diabetes Sister         x2    Breast cancer Sister     No Known Problems Sister     No Known Problems Sister     Hypertension Brother     Seizures Brother     Allergies Brother     No Known Problems Brother     No Known Problems Brother     Stroke Brother     Depression Daughter     Anxiety disorder Daughter     No Known Problems Daughter     Anxiety disorder Son     Breast cancer Maternal Aunt     No Known Problems Maternal Uncle     No Known Problems Paternal Aunt     No Known Problems Paternal Uncle     No Known Problems Maternal Grandmother     No Known Problems Maternal Grandfather     No Known Problems Paternal Grandmother     No Known Problems Paternal Grandfather     Colon cancer Cousin     Amblyopia Neg Hx     Blindness Neg Hx     Cancer Neg Hx     Cataracts Neg Hx     Glaucoma Neg Hx     Macular degeneration Neg Hx     Retinal detachment Neg Hx     Strabismus Neg Hx     Thyroid disease Neg Hx     Melanoma Neg Hx        Objective:      Vitals:    01/31/24 0932   BP: (!) 148/87   Pulse: 67        Constitutional: Well appearing / communicating without difficutly.  NAD.  Eyes: EOM I Bilaterally  Head/Face: Normocephalic. Negative paranasal sinus pressure/tenderness.  Salivary glands WNL.  House Brackmann I Bilaterally.    Right Ear: Auricle normal appearance. External Auditory Canal within normal limits no lesions or masses,TM w/o masses/lesions/perforations. TM mobility noted.   Left Ear: Auricle normal appearance. External Auditory Canal within normal limits no lesions or masses,TM w/o masses/lesions/perforations. TM mobility noted.  Nose: No gross nasal septal deviation. Inferior Turbinates 3+ bilaterally. No septal perforation. No masses/lesions. External nasal skin appears normal without masses/lesions.   Oral Cavity: Gingiva/lips within normal limits.  Dentition/gingiva healthy appearing. Mucus membranes moist. Floor of mouth soft, no masses palpated. Oral Tongue mobile. Hard Palate appears normal.    Oropharynx: Base of tongue appears normal. No masses/lesions noted. Tonsillar fossa/pharyngeal wall without lesions. Posterior oropharynx WNL.  Soft palate without masses. Midline uvula.   Neck/Lymphatic: No LAD I-VI bilaterally.  No thyromegaly.  No masses noted on exam.    Mirror laryngoscopy/nasopharyngoscopy: Active gag reflex.  Unable to perform.    Neuro/Psychiatric: AOx3.  Normal mood and affect.   Cardiovascular: Normal carotid pulses bilaterally, no increasing jugular venous distention noted at cervical region bilaterally.    Respiratory: Normal respiratory effort, no stridor, no retractions noted.    Audiogram interpreted personally by me and discussed in detail with the patient today.   Audiogram results revealed a mild to moderate sensorineural hearing loss bilaterally. Speech reception thresholds were noted at 15 dB in the right ear and 15 dB in the left ear.  Speech discrimination scores were 92% in the right ear and 92% in the left ear.  Tympanometry revealed Type A in the right ear and  Type A in the left ear.       Assessment:         ICD-10-CM ICD-9-CM    1. Sensorineural hearing loss (SNHL) of both ears  H90.3 389.18            Plan:       -otoscopic exam benign with no cerumen impaction noted  -We reviewed the patient's recent audiogram and hearing loss in detail.  We also discussed that she is a good candidate for hearing aids, if and when she the patient is motivated.  She was given handouts with information and pricing of hearing aids, and will contact audiology when ready to proceed.  We also discussed the use hearing protection when exposed to loud noise, including lawn equipment.        Michelle Razo NP    Answers submitted by the patient for this visit:  Review of Symptoms Questionnaire  (Submitted on 1/25/2024)  None of these: Yes  mouth sores: Yes  Tooth/Dental Problems?: Yes  None of these : Yes  None of these: Yes  None of these : Yes  None of these: Yes  None of these: Yes  None of these: Yes  None of these : Yes  None of these: Yes  None of these : Yes  None of these: Yes  None of these: Yes  None of these: Yes

## 2024-02-07 ENCOUNTER — TELEPHONE (OUTPATIENT)
Dept: FAMILY MEDICINE | Facility: CLINIC | Age: 74
End: 2024-02-07
Payer: MEDICARE

## 2024-02-07 NOTE — TELEPHONE ENCOUNTER
Returned patient's call. She said that she had eye irritation a few days ago. She thought maybe something was in her eye and when she awoke the next day, her eye was swollen. It has improved, but some redness and a dark [place] has remained. Scheduled an appointment and she said she will cancel if she continues to improve.

## 2024-02-07 NOTE — TELEPHONE ENCOUNTER
----- Message from Mily Combs sent at 2/6/2024 10:15 AM CST -----  Contact: pt  Type:  Same Day Appointment Request    Caller is requesting a same day appointment.  Caller declined first available appointment listed below.    Name of Caller:pt  When is the first available appointment?books closed  Symptoms:R eye turn dark   Best Call Back Number:[phone]   Additional Information:

## 2024-02-12 ENCOUNTER — OFFICE VISIT (OUTPATIENT)
Dept: FAMILY MEDICINE | Facility: CLINIC | Age: 74
End: 2024-02-12
Payer: MEDICARE

## 2024-02-12 VITALS
SYSTOLIC BLOOD PRESSURE: 122 MMHG | OXYGEN SATURATION: 99 % | TEMPERATURE: 98 F | BODY MASS INDEX: 26.37 KG/M2 | DIASTOLIC BLOOD PRESSURE: 68 MMHG | HEIGHT: 66 IN | HEART RATE: 65 BPM

## 2024-02-12 DIAGNOSIS — S86.912A STRAIN OF LEFT KNEE AND LEG, INITIAL ENCOUNTER: ICD-10-CM

## 2024-02-12 DIAGNOSIS — H10.9 CONJUNCTIVITIS, UNSPECIFIED CONJUNCTIVITIS TYPE, UNSPECIFIED LATERALITY: ICD-10-CM

## 2024-02-12 DIAGNOSIS — H57.8A9: Primary | ICD-10-CM

## 2024-02-12 PROCEDURE — 3008F BODY MASS INDEX DOCD: CPT | Mod: CPTII,S$GLB,, | Performed by: FAMILY MEDICINE

## 2024-02-12 PROCEDURE — 1159F MED LIST DOCD IN RCRD: CPT | Mod: CPTII,S$GLB,, | Performed by: FAMILY MEDICINE

## 2024-02-12 PROCEDURE — 3078F DIAST BP <80 MM HG: CPT | Mod: CPTII,S$GLB,, | Performed by: FAMILY MEDICINE

## 2024-02-12 PROCEDURE — 99214 OFFICE O/P EST MOD 30 MIN: CPT | Mod: S$GLB,,, | Performed by: FAMILY MEDICINE

## 2024-02-12 PROCEDURE — 99999 PR PBB SHADOW E&M-EST. PATIENT-LVL III: CPT | Mod: PBBFAC,,, | Performed by: FAMILY MEDICINE

## 2024-02-12 PROCEDURE — 3074F SYST BP LT 130 MM HG: CPT | Mod: CPTII,S$GLB,, | Performed by: FAMILY MEDICINE

## 2024-02-12 PROCEDURE — 1126F AMNT PAIN NOTED NONE PRSNT: CPT | Mod: CPTII,S$GLB,, | Performed by: FAMILY MEDICINE

## 2024-02-12 PROCEDURE — 1160F RVW MEDS BY RX/DR IN RCRD: CPT | Mod: CPTII,S$GLB,, | Performed by: FAMILY MEDICINE

## 2024-02-12 PROCEDURE — 1101F PT FALLS ASSESS-DOCD LE1/YR: CPT | Mod: CPTII,S$GLB,, | Performed by: FAMILY MEDICINE

## 2024-02-12 PROCEDURE — 3288F FALL RISK ASSESSMENT DOCD: CPT | Mod: CPTII,S$GLB,, | Performed by: FAMILY MEDICINE

## 2024-02-12 RX ORDER — KETOTIFEN FUMARATE 0.35 MG/ML
2 SOLUTION/ DROPS OPHTHALMIC 2 TIMES DAILY
Qty: 5 ML | Refills: 0 | Status: SHIPPED | OUTPATIENT
Start: 2024-02-12 | End: 2024-02-12

## 2024-02-12 RX ORDER — ALPRAZOLAM 0.5 MG/1
0.5 TABLET ORAL DAILY PRN
Qty: 30 TABLET | Refills: 0 | Status: SHIPPED | OUTPATIENT
Start: 2024-02-12

## 2024-02-12 RX ORDER — MELOXICAM 7.5 MG/1
TABLET ORAL
Qty: 30 TABLET | Refills: 2 | Status: SHIPPED | OUTPATIENT
Start: 2024-02-12

## 2024-02-12 RX ORDER — KETOTIFEN FUMARATE 0.35 MG/ML
2 SOLUTION/ DROPS OPHTHALMIC 2 TIMES DAILY
Qty: 5 ML | Refills: 0 | COMMUNITY
Start: 2024-02-12 | End: 2024-03-07

## 2024-02-12 NOTE — PROGRESS NOTES
(Portions of this note were dictated using voice recognition software and may contain dictation related errors in spelling/grammar/syntax not found on text review)    CC:   Chief Complaint   Patient presents with    Eye Problem     Right eye, felt like something was in it a few days ago, had become swollen; redness and irritation continues; had infection in her mouth at the time and sent to specialists       HPI: 73 y.o. female Annual exam was 12/11/2023.  Urgent care visit for eye redness.  A couple weeks ago she noticed her right eye getting somewhat red, had foreign body sensation.  Left eye also started up with similar symptoms.  No tearing.  Not having severe pain but just some discomfort.  Did notice some blurry vision out of her right eye.  Does have an ophthalmology appointment coming up for March.  Does state that in interim, her eyes have gotten a little bit better.  She did initially also have some swelling in the periorbital region along with a darkening of the pigment but this is improved a little bit as well.  No fevers chills or other systemic symptoms.  Has been using some Visine eyedrops for some redness.  Despite overall improvement, still has a slight foreign body sensation in both eyes.      Also she had like a prescription just to have on hand for her meloxicam, does use this occasionally for osteoarthritis pains.  She does not use often.  Also for occasional anxiety if she is traveling she might use half of an alprazolam pill.  This is an old prescription on file and she had like a refill just to have on hand in case she rarely needs it.    Past Medical History:   Diagnosis Date    Anxiety     Cataract     Chronic constipation     DDD (degenerative disc disease), lumbar     Fibroid tumor     Glaucoma     Hyperplastic colon polyp     Osteoarthritis of left knee     Osteopenia     Pes planus        Past Surgical History:   Procedure Laterality Date    BREAST CYST EXCISION      Breast reduction   2004    COLONOSCOPY N/A 10/10/2018    Procedure: COLONOSCOPY;  Surgeon: Cristal Whiteside MD;  Location: [facility] ENDO;  Service: Endoscopy;  Laterality: N/A;    COLONOSCOPY N/A 12/5/2023    Procedure: COLONOSCOPY;  Surgeon: Fernandez Little MD;  Location: [facility] ENDO;  Service: Endoscopy;  Laterality: N/A;    PERCUTANEOUS CRYOTHERAPY OF PERIPHERAL NERVE USING LIQUID NITROUS OXIDE IN CLOSED NEEDLE DEVICE Left 8/23/2018    Procedure: CRYOTHERAPY, NERVE, PERIPHERAL, PERCUTANEOUS, USING LIQUID NITROUS OXIDE IN CLOSED NEEDLE DEVICE;  Surgeon: FRANK Null;  Location: [facility] OR;  Service: Orthopedics;  Laterality: Left;    TOTAL KNEE ARTHROPLASTY Left     TOTAL REDUCTION MAMMOPLASTY      TUBAL LIGATION         Family History   Problem Relation Age of Onset    Diabetes Mother     No Known Problems Father     Diabetes Sister         x2    Breast cancer Sister     No Known Problems Sister     No Known Problems Sister     Hypertension Brother     Seizures Brother     Allergies Brother     No Known Problems Brother     No Known Problems Brother     Stroke Brother     Depression Daughter     Anxiety disorder Daughter     No Known Problems Daughter     Anxiety disorder Son     Breast cancer Maternal Aunt     No Known Problems Maternal Uncle     No Known Problems Paternal Aunt     No Known Problems Paternal Uncle     No Known Problems Maternal Grandmother     No Known Problems Maternal Grandfather     No Known Problems Paternal Grandmother     No Known Problems Paternal Grandfather     Colon cancer Cousin     Amblyopia Neg Hx     Blindness Neg Hx     Cancer Neg Hx     Cataracts Neg Hx     Glaucoma Neg Hx     Macular degeneration Neg Hx     Retinal detachment Neg Hx     Strabismus Neg Hx     Thyroid disease Neg Hx     Melanoma Neg Hx        Social History     Tobacco Use    Smoking status: Never    Smokeless tobacco: Never   Substance Use Topics    Alcohol use: No     Comment: yearly on occasions    Drug use: No  "      Lab Results   Component Value Date    WBC 5.83 12/04/2023    HGB 14.4 12/04/2023    HCT 43.8 12/04/2023    MCV 95 12/04/2023     12/04/2023    CHOL 225 (H) 12/04/2023    TRIG 56 12/04/2023    HDL 77 (H) 12/04/2023    ALT 17 12/04/2023    AST 20 12/04/2023    BILITOT 0.5 12/04/2023    ALKPHOS 85 12/04/2023     12/04/2023    K 4.7 12/04/2023     12/04/2023    CREATININE 0.8 12/04/2023    ESTGFRAFRICA >60 12/13/2021    EGFRNONAA >60 12/13/2021    EGFRNORACEVR >60 12/04/2023    CALCIUM 9.6 12/04/2023    ALBUMIN 3.8 12/04/2023    BUN 14 12/04/2023    CO2 29 12/04/2023    TSH 0.891 12/04/2023    INR 1.0 07/30/2018    HGBA1C 5.2 12/04/2023    LDLCALC 136.8 12/04/2023    GLU 96 12/04/2023    HNSJNEYZ25NY 60 12/06/2022                 Vital signs reviewed  Vitals:    02/12/24 1312   BP: 122/68   BP Location: Right arm   Patient Position: Sitting   BP Method: Medium (Manual)   Pulse: 65   Temp: 97.9 °F (36.6 °C)   TempSrc: Oral   SpO2: 99%   Height: 5' 6" (1.676 m)       Wt Readings from Last 4 Encounters:   01/31/24 74.1 kg (163 lb 5.8 oz)   12/12/23 75.6 kg (166 lb 10.7 oz)   12/11/23 76.2 kg (167 lb 15.9 oz)   12/05/23 77.1 kg (170 lb)       PE:   APPEARANCE: Well nourished, well developed, in no acute distress.    HEAD: Normocephalic, atraumatic.  EYES: PERRL. EOMI.  No foreign bodies identified on visual inspection.  No photophobia noted.  Normal pupillary reaction bilaterally.  Conjunctiva mildly injected bilaterally.    IMPRESSION  1. Foreign body sensation, unspecified eye    2. Strain of left knee and leg, initial encounter    3. Conjunctivitis, unspecified conjunctivitis type, unspecified laterality            PLAN  No orders of the defined types were placed in this encounter.    Medications Ordered This Encounter   Medications    ALPRAZolam (XANAX) 0.5 MG tablet     Sig: Take 1 tablet (0.5 mg total) by mouth daily as needed for Anxiety.     Dispense:  30 tablet     Refill:  0    ketotifen " (ALLERGY EYE, KETOTIFEN,) 0.025 % (0.035 %) ophthalmic solution     Sig: Place 2 drops into both eyes 2 (two) times daily.     Dispense:  5 mL     Refill:  0    meloxicam (MOBIC) 7.5 MG tablet     Sig: TAKE 1 TABLET BY MOUTH EVERY DAY AS NEEDED     Dispense:  30 tablet     Refill:  2      Discussed mild allergic conjunctivitis as a possibility.  There was no eye trauma.  She does not wear contact lenses.  No signs of systemic infection.  No signs of periorbital involvement at this exam.  Does have glaucoma and did discuss potential differential could include worsening of glaucoma with respect to eye redness and some blurry vision.  Overall symptoms have improved but I would advise her to stop the Visine as sometimes this can be irritating.  Will try ketotifen OTC eyedrops p.r.n..  Advise lubricating over-the-counter eyedrops like systane. .  If there is any worsening of symptoms I would recommend following up with her ophthalmologist as early as possible just to rule out any worsening of her glaucoma contributing.    Have sent her a prescription of meloxicam to rarely use for osteoarthritis pain and alprazolam to rarely use for travel-related anxiety.

## 2024-03-05 ENCOUNTER — NURSE TRIAGE (OUTPATIENT)
Dept: ADMINISTRATIVE | Facility: CLINIC | Age: 74
End: 2024-03-05
Payer: MEDICARE

## 2024-03-05 NOTE — TELEPHONE ENCOUNTER
"Pt reports that has been having "allergy related symptoms". Pt is leaving for vacation on Sunday. Reports "vertigo" which is usually relieved by zyrtec. Has a hx of vertigo but hasn't had it in a while but zyrtec is not working now. Pt reports dizziness, blurred vision.    Dispo is to go to ED/ now. Care advice given. Pt verbalized understanding.     Reason for Disposition   Loss of vision or double vision  (Exception: Similar to previous migraines.)     Blurred vision    Additional Information   Negative: SEVERE difficulty breathing (e.g., struggling for each breath, speaks in single words)   Negative: Shock suspected (e.g., cold/pale/clammy skin, too weak to stand, low BP, rapid pulse)   Negative: Difficult to awaken or acting confused (e.g., disoriented, slurred speech)   Negative: Fainted, and still feels dizzy afterwards   Negative: Overdose (accidental or intentional) of medications   Negative: New neurologic deficit that is present now: * Weakness of the face, arm, or leg on one side of the body * Numbness of the face, arm, or leg on one side of the body * Loss of speech or garbled speech   Negative: Heart beating < 50 beats per minute OR > 140 beats per minute   Negative: Sounds like a life-threatening emergency to the triager   Negative: SEVERE dizziness (e.g., unable to stand, requires support to walk, feels like passing out now)   Negative: SEVERE headache or neck pain   Negative: Spinning or tilting sensation (vertigo) present now and one or more stroke risk factors (i.e., hypertension, diabetes mellitus, prior stroke/TIA, heart attack, age over 60) (Exception: Prior physician evaluation for this AND no different/worse than usual.)   Negative: Neurologic deficit that was brief (now gone), ANY of the following:* Weakness of the face, arm, or leg on one side of the body* Numbness of the face, arm, or leg on one side of the body* Loss of speech or garbled speech    Protocols used: Dizziness-A-OH    "

## 2024-03-07 RX ORDER — KETOTIFEN FUMARATE 0.35 MG/ML
2 SOLUTION/ DROPS OPHTHALMIC 2 TIMES DAILY
Qty: 15 ML | Refills: 1 | Status: SHIPPED | OUTPATIENT
Start: 2024-03-07

## 2024-03-21 NOTE — PROGRESS NOTES
HPI    DLS: 11/14/2023    Pt here for 4 Month Check;  Pt states she broke her current pair of glasses wearing an older pair   today. Pt denies any eye pain or discomfort.     Meds;  Cosopt BID OU    1. POAG   2. NS OU   3. Hyperopia     Last edited by Reny Aguirre on 3/26/2024  3:04 PM.            Assessment /Plan     For exam results, see Encounter Report.    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 soft CI)        LASERS    none        GLAUCOMA SURGERIES    none        OTHER EYE SURGERIES    none        CDR    07/0.8        Tbase    >20 (24)  (per pt)         Tmax    > 20  (21)   (per pt)         Ttarget    undetermined          HVF    3 test 2022 to 2023 - not reliable, paracentral/central defects od // inferior and ST defects os        Gonio    3+ w steep approach OU        CCT    583/579        OCT    2 test 2022 to 2023 - RNFL - full od // borderline thinning temp os        Disc photos    Pending     - Ttoday  17/17  - Test done today   -IOP//     2. Cataracts   - may be VS  - C/O GLARE WITH SUN LIGHT   - evaluate after next visit    3. Hyperopia / presbyopia       Try and get records from Dr. Longo    Cont cosopt ou bid     03/28/2023  - doing well, IOP well controlled on cosopt at 18 - 20  OU   - discussed early cataracts, nonVS, discussed what astigmatism is, and how needs correction w MRX OS- poor understanding but inquisitive. happy right now     RTC in 4 months IOP /  AR/MR / BAT - cataract check - c/o GLARE AND VISION NOT AS GOOD AS  - Wound care saw and did not think he needed further management  - No bleeding, no discharge for nasal passage  Plan   - Supportive care    IT USE TO BE    still NEEDS disc photos - OCT done last visit  instead of disc photos

## 2024-03-26 ENCOUNTER — OFFICE VISIT (OUTPATIENT)
Dept: OPHTHALMOLOGY | Facility: CLINIC | Age: 74
End: 2024-03-26
Payer: MEDICARE

## 2024-03-26 DIAGNOSIS — H52.4 HYPEROPIA WITH PRESBYOPIA OF BOTH EYES: ICD-10-CM

## 2024-03-26 DIAGNOSIS — H52.03 HYPEROPIA WITH PRESBYOPIA OF BOTH EYES: ICD-10-CM

## 2024-03-26 DIAGNOSIS — H25.13 NUCLEAR SCLEROTIC CATARACT OF BOTH EYES: ICD-10-CM

## 2024-03-26 DIAGNOSIS — H40.023 OPEN ANGLE WITH BORDERLINE FINDINGS AND HIGH GLAUCOMA RISK IN BOTH EYES: Primary | ICD-10-CM

## 2024-03-26 PROCEDURE — 1159F MED LIST DOCD IN RCRD: CPT | Mod: CPTII,S$GLB,, | Performed by: OPHTHALMOLOGY

## 2024-03-26 PROCEDURE — 99999 PR PBB SHADOW E&M-EST. PATIENT-LVL II: CPT | Mod: PBBFAC,,, | Performed by: OPHTHALMOLOGY

## 2024-03-26 PROCEDURE — 1101F PT FALLS ASSESS-DOCD LE1/YR: CPT | Mod: CPTII,S$GLB,, | Performed by: OPHTHALMOLOGY

## 2024-03-26 PROCEDURE — 1126F AMNT PAIN NOTED NONE PRSNT: CPT | Mod: CPTII,S$GLB,, | Performed by: OPHTHALMOLOGY

## 2024-03-26 PROCEDURE — 99214 OFFICE O/P EST MOD 30 MIN: CPT | Mod: S$GLB,,, | Performed by: OPHTHALMOLOGY

## 2024-03-26 PROCEDURE — 3288F FALL RISK ASSESSMENT DOCD: CPT | Mod: CPTII,S$GLB,, | Performed by: OPHTHALMOLOGY

## 2024-03-26 PROCEDURE — 1160F RVW MEDS BY RX/DR IN RCRD: CPT | Mod: CPTII,S$GLB,, | Performed by: OPHTHALMOLOGY

## 2024-03-26 RX ORDER — DORZOLAMIDE HYDROCHLORIDE AND TIMOLOL MALEATE 20; 5 MG/ML; MG/ML
1 SOLUTION/ DROPS OPHTHALMIC 2 TIMES DAILY
Qty: 30 ML | Refills: 3 | Status: SHIPPED | OUTPATIENT
Start: 2024-03-26

## 2024-04-16 ENCOUNTER — TELEPHONE (OUTPATIENT)
Dept: FAMILY MEDICINE | Facility: CLINIC | Age: 74
End: 2024-04-16
Payer: MEDICARE

## 2024-04-16 NOTE — TELEPHONE ENCOUNTER
----- Message from Mari Henry sent at 4/16/2024  1:59 PM CDT -----  Type:  Patient Returning Call    Who Called: Huber Pena  Who Left Message for Patient: Staff  Does the patient know what this is regarding?: Appt   Would the patient rather a call back or a response via [facility]?  call  Best Call Back Number:  [phone]  Additional Information:  Pt states that she has high blood pressure and previously called to schedule an appt since nothing is available.

## 2024-04-17 ENCOUNTER — TELEPHONE (OUTPATIENT)
Dept: FAMILY MEDICINE | Facility: CLINIC | Age: 74
End: 2024-04-17
Payer: MEDICARE

## 2024-04-17 NOTE — TELEPHONE ENCOUNTER
Patient came in to request an appointment. She said that she had chest pains. I told her that if she had active chest pains, she needs to go to the ER, but asked for any other symptoms, which she denied. She also said she was having blood pressure problems and some fatigue and requested an appointment. I asked what the readings have been and she said they have been around 144/85. I scheduled an appointment with Dr Clayton's colleague, Dr Salvador.

## 2024-04-22 ENCOUNTER — OFFICE VISIT (OUTPATIENT)
Dept: FAMILY MEDICINE | Facility: CLINIC | Age: 74
End: 2024-04-22
Payer: MEDICARE

## 2024-04-22 ENCOUNTER — LAB VISIT (OUTPATIENT)
Dept: LAB | Facility: HOSPITAL | Age: 74
End: 2024-04-22
Payer: MEDICARE

## 2024-04-22 VITALS
OXYGEN SATURATION: 99 % | SYSTOLIC BLOOD PRESSURE: 142 MMHG | WEIGHT: 165 LBS | DIASTOLIC BLOOD PRESSURE: 76 MMHG | TEMPERATURE: 98 F | BODY MASS INDEX: 26.52 KG/M2 | HEIGHT: 66 IN | HEART RATE: 48 BPM

## 2024-04-22 DIAGNOSIS — R03.0 ELEVATED BLOOD PRESSURE READING: ICD-10-CM

## 2024-04-22 DIAGNOSIS — R79.9 ABNORMAL BLOOD CHEMISTRY: ICD-10-CM

## 2024-04-22 DIAGNOSIS — Z79.899 MEDICATION MANAGEMENT: ICD-10-CM

## 2024-04-22 DIAGNOSIS — R07.9 CHEST PAIN, UNSPECIFIED TYPE: ICD-10-CM

## 2024-04-22 DIAGNOSIS — R68.89 DECREASED EXERCISE TOLERANCE: ICD-10-CM

## 2024-04-22 DIAGNOSIS — R10.13 EPIGASTRIC PAIN: Primary | ICD-10-CM

## 2024-04-22 DIAGNOSIS — R53.83 FATIGUE, UNSPECIFIED TYPE: ICD-10-CM

## 2024-04-22 LAB
ALBUMIN SERPL BCP-MCNC: 4 G/DL (ref 3.5–5.2)
ALP SERPL-CCNC: 87 U/L (ref 55–135)
ALT SERPL W/O P-5'-P-CCNC: 26 U/L (ref 10–44)
ANION GAP SERPL CALC-SCNC: 7 MMOL/L (ref 8–16)
AST SERPL-CCNC: 26 U/L (ref 10–40)
BASOPHILS # BLD AUTO: 0.06 K/UL (ref 0–0.2)
BASOPHILS NFR BLD: 0.9 % (ref 0–1.9)
BILIRUB SERPL-MCNC: 0.6 MG/DL (ref 0.1–1)
BUN SERPL-MCNC: 13 MG/DL (ref 8–23)
CALCIUM SERPL-MCNC: 9.5 MG/DL (ref 8.7–10.5)
CHLORIDE SERPL-SCNC: 107 MMOL/L (ref 95–110)
CO2 SERPL-SCNC: 27 MMOL/L (ref 23–29)
CREAT SERPL-MCNC: 0.8 MG/DL (ref 0.5–1.4)
DIFFERENTIAL METHOD BLD: ABNORMAL
EOSINOPHIL # BLD AUTO: 0.2 K/UL (ref 0–0.5)
EOSINOPHIL NFR BLD: 2.4 % (ref 0–8)
ERYTHROCYTE [DISTWIDTH] IN BLOOD BY AUTOMATED COUNT: 13.9 % (ref 11.5–14.5)
EST. GFR  (NO RACE VARIABLE): >60 ML/MIN/1.73 M^2
FERRITIN SERPL-MCNC: 121 NG/ML (ref 20–300)
GLUCOSE SERPL-MCNC: 83 MG/DL (ref 70–110)
HCT VFR BLD AUTO: 40.1 % (ref 37–48.5)
HGB BLD-MCNC: 13.4 G/DL (ref 12–16)
IMM GRANULOCYTES # BLD AUTO: 0.01 K/UL (ref 0–0.04)
IMM GRANULOCYTES NFR BLD AUTO: 0.2 % (ref 0–0.5)
IRON SERPL-MCNC: 85 UG/DL (ref 30–160)
LYMPHOCYTES # BLD AUTO: 2.4 K/UL (ref 1–4.8)
LYMPHOCYTES NFR BLD: 38.3 % (ref 18–48)
MAGNESIUM SERPL-MCNC: 2 MG/DL (ref 1.6–2.6)
MCH RBC QN AUTO: 31.8 PG (ref 27–31)
MCHC RBC AUTO-ENTMCNC: 33.4 G/DL (ref 32–36)
MCV RBC AUTO: 95 FL (ref 82–98)
MONOCYTES # BLD AUTO: 0.6 K/UL (ref 0.3–1)
MONOCYTES NFR BLD: 9.7 % (ref 4–15)
NEUTROPHILS # BLD AUTO: 3.1 K/UL (ref 1.8–7.7)
NEUTROPHILS NFR BLD: 48.5 % (ref 38–73)
NRBC BLD-RTO: 0 /100 WBC
PLATELET # BLD AUTO: 192 K/UL (ref 150–450)
PMV BLD AUTO: 10.5 FL (ref 9.2–12.9)
POTASSIUM SERPL-SCNC: 4.4 MMOL/L (ref 3.5–5.1)
PROT SERPL-MCNC: 7.5 G/DL (ref 6–8.4)
RBC # BLD AUTO: 4.21 M/UL (ref 4–5.4)
SATURATED IRON: 24 % (ref 20–50)
SODIUM SERPL-SCNC: 141 MMOL/L (ref 136–145)
TOTAL IRON BINDING CAPACITY: 354 UG/DL (ref 250–450)
TRANSFERRIN SERPL-MCNC: 239 MG/DL (ref 200–375)
TSH SERPL DL<=0.005 MIU/L-ACNC: 0.85 UIU/ML (ref 0.4–4)
VIT B12 SERPL-MCNC: 954 PG/ML (ref 210–950)
WBC # BLD AUTO: 6.32 K/UL (ref 3.9–12.7)

## 2024-04-22 PROCEDURE — 36415 COLL VENOUS BLD VENIPUNCTURE: CPT | Performed by: FAMILY MEDICINE

## 2024-04-22 PROCEDURE — 3078F DIAST BP <80 MM HG: CPT | Mod: CPTII,S$GLB,, | Performed by: FAMILY MEDICINE

## 2024-04-22 PROCEDURE — 99999 PR PBB SHADOW E&M-EST. PATIENT-LVL III: CPT | Mod: PBBFAC,,, | Performed by: FAMILY MEDICINE

## 2024-04-22 PROCEDURE — 1101F PT FALLS ASSESS-DOCD LE1/YR: CPT | Mod: CPTII,S$GLB,, | Performed by: FAMILY MEDICINE

## 2024-04-22 PROCEDURE — 1159F MED LIST DOCD IN RCRD: CPT | Mod: CPTII,S$GLB,, | Performed by: FAMILY MEDICINE

## 2024-04-22 PROCEDURE — 82607 VITAMIN B-12: CPT | Performed by: FAMILY MEDICINE

## 2024-04-22 PROCEDURE — 84443 ASSAY THYROID STIM HORMONE: CPT | Performed by: FAMILY MEDICINE

## 2024-04-22 PROCEDURE — 83735 ASSAY OF MAGNESIUM: CPT | Performed by: FAMILY MEDICINE

## 2024-04-22 PROCEDURE — 1126F AMNT PAIN NOTED NONE PRSNT: CPT | Mod: CPTII,S$GLB,, | Performed by: FAMILY MEDICINE

## 2024-04-22 PROCEDURE — 1160F RVW MEDS BY RX/DR IN RCRD: CPT | Mod: CPTII,S$GLB,, | Performed by: FAMILY MEDICINE

## 2024-04-22 PROCEDURE — 3288F FALL RISK ASSESSMENT DOCD: CPT | Mod: CPTII,S$GLB,, | Performed by: FAMILY MEDICINE

## 2024-04-22 PROCEDURE — 82728 ASSAY OF FERRITIN: CPT | Performed by: FAMILY MEDICINE

## 2024-04-22 PROCEDURE — 3077F SYST BP >= 140 MM HG: CPT | Mod: CPTII,S$GLB,, | Performed by: FAMILY MEDICINE

## 2024-04-22 PROCEDURE — 99214 OFFICE O/P EST MOD 30 MIN: CPT | Mod: S$GLB,,, | Performed by: FAMILY MEDICINE

## 2024-04-22 PROCEDURE — 85025 COMPLETE CBC W/AUTO DIFF WBC: CPT | Performed by: FAMILY MEDICINE

## 2024-04-22 PROCEDURE — 80053 COMPREHEN METABOLIC PANEL: CPT | Performed by: FAMILY MEDICINE

## 2024-04-22 PROCEDURE — 83540 ASSAY OF IRON: CPT | Performed by: FAMILY MEDICINE

## 2024-04-22 NOTE — PROGRESS NOTES
Office Visit    Patient Name: Huber Pena    : 1950  MRN: [id number]    Subjective:  Huber is a 73 y.o. female who presents today for:    Chest Pain, Hypertension, and Fatigue    PCP-Dr. Calyton, annual physical 2023, labs 2023 with normal TSH, normal hematocrit/white cell count, normal kidney/liver function, A1c 5.2.  Lipids with , normal triglycerides, HDL 77.    73-year-old patient of Dr. Clayton with medical history pertinent for known atherosclerosis treated with Crestor 10, overweight BMI, but no history of diabetes, hypertension or coronary artery disease who presents to discuss fatigue, chest pain, and recently elevated BP readings.     She doesn't monitor BP regularly but decided to start checking on her 's machine.  Over the last few weeks as she has monitored her blood pressure she is noticing several systolic readings in the 140s, diastolic readings generally in the 70s.  Heart rate in the upper 40s to low to mid 50s.    She is noticing increased fatigue and some epigastric pain/ ache in her her epigastric area that is off and on over the last several years-- recently it seems like this sensation has gotten worse and when she feels it she has to sit down and can not exert herself.     She able to complete her usual exercise routines, however and has not noticed decreased stamina in that regard.      Goes to gym early 3 days per week and then will walk. Walks 5 miles briskly a few days per week. Feels well with exertion in isolated intervals but overall feels like her activity tolerance has decreased recently.    She does not take NSAIDS but recently has had a flare of allergy symptoms managed with flonase and Zyrtec.     No significant changes to diet or stress level. Drinks up to 3 bottles, 4 at the most daily.     Wakes up at 330AM, in bed before 10-- trying to increase sleep by going to bed earlier.     Prior EKG 18:  Sinus bradycardia   Early repolarization    Otherwise normal ECG             PAST MEDICAL HISTORY, SURGICAL/SOCIAL/FAMILY HISTORY REVIEWED AS PER CHART, WITH PERTINENT FINDINGS INCLUDED IN HISTORY SECTION OF NOTE.     Current Medications    Medication List with Changes/Refills   Current Medications    ALPRAZOLAM (XANAX) 0.5 MG TABLET    Take 1 tablet (0.5 mg total) by mouth daily as needed for Anxiety.    CLINDAMYCIN (CLEOCIN) 150 MG CAPSULE    Take 300 mg by mouth as needed.    CLOTRIMAZOLE-BETAMETHASONE 1-0.05% (LOTRISONE) CREAM    Apply topically 2 (two) times daily.    DORZOLAMIDE-TIMOLOL 2-0.5% (COSOPT) 22.3-6.8 MG/ML OPHTHALMIC SOLUTION    Place 1 drop into both eyes 2 (two) times daily.    HYDROQUINONE 4 % CREA    Apply topically 2 (two) times daily.    KETOTIFEN (ZADITOR) 0.025 % (0.035 %) OPHTHALMIC SOLUTION    PLACE 2 DROPS INTO BOTH EYES 2 (TWO) TIMES DAILY.    MELOXICAM (MOBIC) 7.5 MG TABLET    TAKE 1 TABLET BY MOUTH EVERY DAY AS NEEDED    NYSTATIN (MYCOSTATIN) POWDER    Apply topically as needed.    ROSUVASTATIN (CRESTOR) 10 MG TABLET    Take 1 tablet (10 mg total) by mouth once daily.       Allergies   Review of patient's allergies indicates:   Allergen Reactions    Benadryl [diphenhydramine hcl]      Patient states she was in her 20's and had some type of reaction but does not remember exactly what it was.           Review of Systems (Pertinent positives)  Review of Systems   Constitutional:  Positive for fatigue. Negative for fever and unexpected weight change.   Respiratory:  Positive for chest tightness (Intermittent in the epigastric area radiating substernally). Negative for shortness of breath.    Cardiovascular:  Negative for palpitations and leg swelling.   Gastrointestinal:  Positive for abdominal pain (epigastric).   Allergic/Immunologic: Positive for environmental allergies.   Neurological:  Negative for dizziness and light-headedness.       BP (!) 142/76 (BP Location: Left arm, Patient Position: Sitting, BP Method: Medium  "(Manual))   Pulse (!) 48   Temp 97.7 °F (36.5 °C)   Ht 5' 6" (1.676 m)   Wt 74.8 kg (165 lb)   LMP  (LMP Unknown)   SpO2 99%   BMI 26.63 kg/m²     Physical Exam  Vitals reviewed.   Constitutional:       General: She is not in acute distress.     Appearance: Normal appearance. She is well-developed.   HENT:      Head: Normocephalic and atraumatic.   Eyes:      Conjunctiva/sclera: Conjunctivae normal.   Cardiovascular:      Rate and Rhythm: Regular rhythm. Bradycardia present.      Heart sounds: No murmur heard.  Pulmonary:      Effort: Pulmonary effort is normal.      Breath sounds: Normal breath sounds.   Musculoskeletal:      Right lower leg: No edema.      Left lower leg: No edema.   Skin:     General: Skin is warm and dry.   Neurological:      General: No focal deficit present.      Mental Status: She is alert and oriented to person, place, and time.   Psychiatric:         Mood and Affect: Mood normal.         Behavior: Behavior normal.           Assessment/Plan:  Huber Pena is a 73 y.o. female who presents today for :        ICD-10-CM ICD-9-CM    1. Epigastric pain  R10.13 789.06       2. Fatigue, unspecified type  R53.83 780.79 Stress Echo Which stress agent will be used? Treadmill Exercise; Color Flow Doppler? Yes      Comprehensive Metabolic Panel      CBC Auto Differential      TSH      Ferritin      Vitamin B12      Magnesium      Iron and TIBC      3. Elevated blood pressure reading  R03.0 796.2 Stress Echo Which stress agent will be used? Treadmill Exercise; Color Flow Doppler? Yes      Comprehensive Metabolic Panel      CBC Auto Differential      TSH      Ferritin      Vitamin B12      Magnesium      Iron and TIBC      4. Decreased exercise tolerance  R68.89 780.99 Comprehensive Metabolic Panel      CBC Auto Differential      TSH      Ferritin      Vitamin B12      Magnesium      Iron and TIBC      5. Chest pain, unspecified type  R07.9 786.50 Stress Echo Which stress agent will be used? " Treadmill Exercise; Color Flow Doppler? Yes      Comprehensive Metabolic Panel      CBC Auto Differential      TSH      Ferritin      Vitamin B12      Magnesium      Iron and TIBC      6. Abnormal blood chemistry  R79.9 790.6 Comprehensive Metabolic Panel      CBC Auto Differential      TSH      Ferritin      Vitamin B12      Magnesium      Iron and TIBC      7. Medication management  Z79.899 V58.69 Comprehensive Metabolic Panel      CBC Auto Differential      TSH      Ferritin      Vitamin B12      Magnesium      Iron and TIBC        73-year-old female with cardiovascular risk factors that include mildly overweight BMI-though she is in excellent physical health overall and may have increased muscle mass, aortic atherosclerosis managed with Crestor 10 who presents today to discuss increasing epigastric to substernal pain associated with increased fatigue and borderline blood pressure elevations.      In isolated settings her exercise tolerance is very good but she is concerned about increasing episodes where she feels an epigastric to retrosternal pain and needs to sit down.  She is not lightheaded on these occasions but feels that she can not exert herself.      Has some chronic sinus bradycardia, likely due to her high level of physical fitness-this dates back to her most recent EKG performed in 2018 which was reviewed.      Will update labs and have ordered a stress echo for further evaluation of this decreased activity tolerance and intermittent pain.      Have also advise continued blood pressure monitoring-mild dehydration and lack of sleep maybe contributing to her borderline elevation of readings.  She would like to try natural methods to lower her blood pressure and was advised to keep a home log over the next month for review with her PCP Dr. Clayton.  Will perform nonfasting labs today for further evaluation of her fatigue and she can have a lipid panel on file in 4 weeks prior to a blood pressure  check/routine follow-up with Dr. Clayton.  Will advise sooner based on today's labs and results of stress echo.      Her epigastric area pain may be GI in origin-and further evaluation in that regard may be worth considering if her cardiac workup is unremarkable.    There are no Patient Instructions on file for this visit.      Follow up for to follow up on lab results, to review results of diagnostic procedure.

## 2024-05-06 ENCOUNTER — TELEPHONE (OUTPATIENT)
Dept: FAMILY MEDICINE | Facility: CLINIC | Age: 74
End: 2024-05-06
Payer: MEDICARE

## 2024-05-06 NOTE — TELEPHONE ENCOUNTER
Called patient and went over Dr Salvador's recommendation. She was in agreement and said that she has made the changes that she discussed with Dr Salvador regarding her sleep, diet, and hydration.    Dr Clayton - Forwarding this to you because I wasn't able to see if Dr Salvador sent it to you. Please see her message. The patient is scheduled with you on 5/22.

## 2024-05-06 NOTE — TELEPHONE ENCOUNTER
Will see patient for upcoming appointment and then decide further workup unless any more severe symptoms where she needs to be seen sooner.

## 2024-05-06 NOTE — TELEPHONE ENCOUNTER
Rosalee Salvador MD Alexander Sommer LEWIS, MA1 hour ago (1:26 PM)       She has an upcoming follow up with Dr Clayton. I did not feel strongly about her having the stress ECHO since she has very good tolerance for exercise and no concerns for heart failure, but I know she is concerned about some fatigue episodes she is having and like her energy levels have declined. Her labs came back looking good, so I would advise that she wait until her follow up with Dr Clayton, and he can re-order it potentially under a different diagnosis if he feels it's indicated.    I will cc this to him so he is aware of the concerns and initial denial for this study.  Sommer-please inform her that I feel it's safe to re-address this test at her upcoming PCP appointment, especially since she was going to be working on improving her sleep schedule and hydration, which may have helped some of her concerning symptoms to subside.    Ney--patient is very concerned about her heart and really wanted it evaluated. She has very good exercise tolerance and no exertional symptoms just more vague concern for decline in energy and episodes where she feels fatigued. Hopefully she is feeling a bit better when she sees you but can be re-ordered if you feel it's indicated, thanks,  Rosalee

## 2024-05-06 NOTE — TELEPHONE ENCOUNTER
----- Message from Martina Groves sent at 5/6/2024  8:23 AM CDT -----  Regarding: ELICIA An, Ms. Pena is scheduled for STRESS ECHO (CUPID ONLY) 05/07/24 [facility] is requesting a Peer to Peer. Ms. Pena has been been informed of the status and was advised to follow up with ordering physician.    Prior to issuing an Adverse Determination, we're offering an opportunity for your office to provide the clinical information that demonstrates this request is medically necessary. Please note that you must provide the clinical information Before 5/6/2024. Please call [phone], select Option # 1, and enter in Reference Number [id number] to speak with a clinician about this request. If you'd rather engage in a Mchg-bn-Jbfu discussion, this discussion must take place Before 5/6/2024. If you wish to schedule a Izbs-yt-Isuw discussion, please call [phone], select Option # 3, and enter in Reference Number [id number].

## 2024-05-07 DIAGNOSIS — H40.023 OAG (OPEN ANGLE GLAUCOMA) SUSPECT, HIGH RISK, BILATERAL: Primary | ICD-10-CM

## 2024-05-07 DIAGNOSIS — H40.023 OPEN ANGLE WITH BORDERLINE FINDINGS AND HIGH GLAUCOMA RISK IN BOTH EYES: ICD-10-CM

## 2024-05-07 RX ORDER — DORZOLAMIDE HCL 20 MG/ML
1 SOLUTION/ DROPS OPHTHALMIC 2 TIMES DAILY
COMMUNITY
Start: 2024-05-07 | End: 2024-05-07 | Stop reason: SDUPTHER

## 2024-05-07 RX ORDER — DORZOLAMIDE HYDROCHLORIDE AND TIMOLOL MALEATE 20; 5 MG/ML; MG/ML
1 SOLUTION/ DROPS OPHTHALMIC 2 TIMES DAILY
Qty: 30 ML | Refills: 3 | OUTPATIENT
Start: 2024-05-07

## 2024-05-07 RX ORDER — TIMOLOL MALEATE 5 MG/ML
1 SOLUTION/ DROPS OPHTHALMIC 2 TIMES DAILY
Qty: 15 ML | Refills: 3 | Status: SHIPPED | OUTPATIENT
Start: 2024-05-07

## 2024-05-07 RX ORDER — DORZOLAMIDE HCL 20 MG/ML
1 SOLUTION/ DROPS OPHTHALMIC 2 TIMES DAILY
Qty: 30 ML | Refills: 3 | Status: SHIPPED | OUTPATIENT
Start: 2024-05-07

## 2024-05-07 RX ORDER — TIMOLOL MALEATE 5 MG/ML
1 SOLUTION/ DROPS OPHTHALMIC 2 TIMES DAILY
COMMUNITY
Start: 2024-05-07 | End: 2024-05-07 | Stop reason: SDUPTHER

## 2024-05-07 NOTE — TELEPHONE ENCOUNTER
[facility] states that Cosopt (dorzolamide-timolol) has been on back order for few weeks now and needs alternative. They do have the separate Dorzolamide and Timolol in stock, and so pt will be switched to that for now.

## 2024-05-15 ENCOUNTER — LAB VISIT (OUTPATIENT)
Dept: LAB | Facility: HOSPITAL | Age: 74
End: 2024-05-15
Attending: FAMILY MEDICINE
Payer: MEDICARE

## 2024-05-15 DIAGNOSIS — I70.0 AORTIC ATHEROSCLEROSIS: ICD-10-CM

## 2024-05-15 LAB
ALBUMIN SERPL BCP-MCNC: 3.8 G/DL (ref 3.5–5.2)
ALP SERPL-CCNC: 75 U/L (ref 55–135)
ALT SERPL W/O P-5'-P-CCNC: 22 U/L (ref 10–44)
ANION GAP SERPL CALC-SCNC: 10 MMOL/L (ref 8–16)
AST SERPL-CCNC: 25 U/L (ref 10–40)
BILIRUB SERPL-MCNC: 0.8 MG/DL (ref 0.1–1)
BUN SERPL-MCNC: 12 MG/DL (ref 8–23)
CALCIUM SERPL-MCNC: 9.3 MG/DL (ref 8.7–10.5)
CHLORIDE SERPL-SCNC: 110 MMOL/L (ref 95–110)
CHOLEST SERPL-MCNC: 138 MG/DL (ref 120–199)
CHOLEST/HDLC SERPL: 2 {RATIO} (ref 2–5)
CO2 SERPL-SCNC: 22 MMOL/L (ref 23–29)
CREAT SERPL-MCNC: 0.8 MG/DL (ref 0.5–1.4)
EST. GFR  (NO RACE VARIABLE): >60 ML/MIN/1.73 M^2
GLUCOSE SERPL-MCNC: 83 MG/DL (ref 70–110)
HDLC SERPL-MCNC: 70 MG/DL (ref 40–75)
HDLC SERPL: 50.7 % (ref 20–50)
LDLC SERPL CALC-MCNC: 57.4 MG/DL (ref 63–159)
NONHDLC SERPL-MCNC: 68 MG/DL
POTASSIUM SERPL-SCNC: 4.3 MMOL/L (ref 3.5–5.1)
PROT SERPL-MCNC: 7.3 G/DL (ref 6–8.4)
SODIUM SERPL-SCNC: 142 MMOL/L (ref 136–145)
TRIGL SERPL-MCNC: 53 MG/DL (ref 30–150)

## 2024-05-15 PROCEDURE — 80053 COMPREHEN METABOLIC PANEL: CPT | Performed by: FAMILY MEDICINE

## 2024-05-15 PROCEDURE — 80061 LIPID PANEL: CPT | Performed by: FAMILY MEDICINE

## 2024-05-15 PROCEDURE — 36415 COLL VENOUS BLD VENIPUNCTURE: CPT | Performed by: FAMILY MEDICINE

## 2024-05-22 ENCOUNTER — OFFICE VISIT (OUTPATIENT)
Dept: FAMILY MEDICINE | Facility: CLINIC | Age: 74
End: 2024-05-22
Payer: MEDICARE

## 2024-05-22 VITALS
BODY MASS INDEX: 27.28 KG/M2 | TEMPERATURE: 98 F | SYSTOLIC BLOOD PRESSURE: 134 MMHG | WEIGHT: 169.75 LBS | HEART RATE: 57 BPM | HEIGHT: 66 IN | DIASTOLIC BLOOD PRESSURE: 64 MMHG | OXYGEN SATURATION: 98 %

## 2024-05-22 DIAGNOSIS — I70.0 AORTIC ATHEROSCLEROSIS: ICD-10-CM

## 2024-05-22 DIAGNOSIS — R07.89 CHEST DISCOMFORT: Primary | ICD-10-CM

## 2024-05-22 PROCEDURE — 1101F PT FALLS ASSESS-DOCD LE1/YR: CPT | Mod: CPTII,S$GLB,, | Performed by: FAMILY MEDICINE

## 2024-05-22 PROCEDURE — 99999 PR PBB SHADOW E&M-EST. PATIENT-LVL III: CPT | Mod: PBBFAC,,, | Performed by: FAMILY MEDICINE

## 2024-05-22 PROCEDURE — 1126F AMNT PAIN NOTED NONE PRSNT: CPT | Mod: CPTII,S$GLB,, | Performed by: FAMILY MEDICINE

## 2024-05-22 PROCEDURE — 1159F MED LIST DOCD IN RCRD: CPT | Mod: CPTII,S$GLB,, | Performed by: FAMILY MEDICINE

## 2024-05-22 PROCEDURE — 99214 OFFICE O/P EST MOD 30 MIN: CPT | Mod: S$GLB,,, | Performed by: FAMILY MEDICINE

## 2024-05-22 PROCEDURE — 3075F SYST BP GE 130 - 139MM HG: CPT | Mod: CPTII,S$GLB,, | Performed by: FAMILY MEDICINE

## 2024-05-22 PROCEDURE — 3078F DIAST BP <80 MM HG: CPT | Mod: CPTII,S$GLB,, | Performed by: FAMILY MEDICINE

## 2024-05-22 PROCEDURE — 3288F FALL RISK ASSESSMENT DOCD: CPT | Mod: CPTII,S$GLB,, | Performed by: FAMILY MEDICINE

## 2024-05-22 NOTE — PROGRESS NOTES
(Portions of this note were dictated using voice recognition software and may contain dictation related errors in spelling/grammar/syntax not found on text review)    CC:   Chief Complaint   Patient presents with    Follow-up       HPI: 73 y.o. female Annual exam December 2023.  She had seen Dr. Salvador more recently on 04/22/2024 concerned about fatigue and epigastric pain on and off for the last several years.  Was concerned about exertional symptoms.  Started also checking her blood pressure at home and noticed several systolic readings in the 140s, diastolics generally in the 70s.   per Dr. Salvador's note, she was still doing her regular exercise routines and did not notice decreased exercise stamina, going to gym 3 days a week and then walking.  Prior EKG in 2018 showed sinus bradycardia, early repolarization, no acute ischemic changes.  She does have aortic arthrosclerosis noted on prior imaging, currently on Crestor 10 mg daily.  Given concern about cardiac manifestations of her symptoms, was ordered stress echo but this was denied by her insurance company.   elevated blood pressure reading during the visit was discussed.  She wanted to try lifestyle modification and home monitoring of blood pressure and was not started on any pharmacotherapy for this.    She had multiple labs done as below, normal hemoglobin 13.4, normal iron studies, B12 level elevated, normal TSH, normal magnesium, normal renal and hepatic profiles, normal blood sugar of 83.  Lipids showed normal cholesterol of 138, triglycerides 53, HDL 70, LDL 57    Update.  She has noticed resolution of her chest discomfort symptoms.  She realized that she is started a homemade hot chocolate drink every day with some dark chocolate, occasionally would drink a cup of coffee as well.  She stopped the hot chocolate and notice your symptoms improve.  She denies any exertional chest pain or shortness a breath.  At the time of the above symptoms she would  noticed more symptoms when she is standing up for long period of time but has not notice this at all since.  Blood pressure stable.  Compliant with Crestor.  Denies any other illnesses.  Past Medical History:   Diagnosis Date    Anxiety     Aortic atherosclerosis     Noted on imaging    Cataract     Chronic constipation     DDD (degenerative disc disease), lumbar     Fibroid tumor     Glaucoma     Hyperplastic colon polyp     Osteoarthritis of left knee     Osteopenia     Pes planus        Past Surgical History:   Procedure Laterality Date    BREAST CYST EXCISION      Breast reduction  2004    COLONOSCOPY N/A 10/10/2018    Procedure: COLONOSCOPY;  Surgeon: Cristal Whiteside MD;  Location: [facility] ENDO;  Service: Endoscopy;  Laterality: N/A;    COLONOSCOPY N/A 12/5/2023    Procedure: COLONOSCOPY;  Surgeon: Fernandez Little MD;  Location: [facility] ENDO;  Service: Endoscopy;  Laterality: N/A;    PERCUTANEOUS CRYOTHERAPY OF PERIPHERAL NERVE USING LIQUID NITROUS OXIDE IN CLOSED NEEDLE DEVICE Left 8/23/2018    Procedure: CRYOTHERAPY, NERVE, PERIPHERAL, PERCUTANEOUS, USING LIQUID NITROUS OXIDE IN CLOSED NEEDLE DEVICE;  Surgeon: FRANK Null;  Location: [facility] OR;  Service: Orthopedics;  Laterality: Left;    TOTAL KNEE ARTHROPLASTY Left     TOTAL REDUCTION MAMMOPLASTY      TUBAL LIGATION         Family History   Problem Relation Name Age of Onset    Diabetes Mother      No Known Problems Father      Diabetes Sister [place]         x2    Breast cancer Sister [place]     No Known Problems Sister Gracy     No Known Problems Sister Quita Tung     Hypertension Brother Laurent     Seizures Brother Sean     Allergies Brother Dequan     No Known Problems Brother Ashok     No Known Problems Brother Johan     Stroke Brother Louis     Depression Daughter Cherie     Anxiety disorder Daughter Cherie     No Known Problems Daughter Jinny     Anxiety disorder Son      Breast cancer Maternal Aunt      No Known Problems  "Maternal Uncle      No Known Problems Paternal Aunt      No Known Problems Paternal Uncle      No Known Problems Maternal Grandmother      No Known Problems Maternal Grandfather      No Known Problems Paternal Grandmother      No Known Problems Paternal Grandfather      Colon cancer Cousin      Amblyopia Neg Hx      Blindness Neg Hx      Cancer Neg Hx      Cataracts Neg Hx      Glaucoma Neg Hx      Macular degeneration Neg Hx      Retinal detachment Neg Hx      Strabismus Neg Hx      Thyroid disease Neg Hx      Melanoma Neg Hx         Social History     Tobacco Use    Smoking status: Never    Smokeless tobacco: Never   Substance Use Topics    Alcohol use: No     Comment: yearly on occasions    Drug use: No       Lab Results   Component Value Date    WBC 6.32 04/22/2024    HGB 13.4 04/22/2024    HCT 40.1 04/22/2024    MCV 95 04/22/2024     04/22/2024    CHOL 138 05/15/2024    TRIG 53 05/15/2024    HDL 70 05/15/2024    ALT 22 05/15/2024    AST 25 05/15/2024    BILITOT 0.8 05/15/2024    ALKPHOS 75 05/15/2024     05/15/2024    K 4.3 05/15/2024     05/15/2024    CREATININE 0.8 05/15/2024    ESTGFRAFRICA >60 12/13/2021    EGFRNONAA >60 12/13/2021    EGFRNORACEVR >60 05/15/2024    CALCIUM 9.3 05/15/2024    ALBUMIN 3.8 05/15/2024    BUN 12 05/15/2024    CO2 22 (L) 05/15/2024    TSH 0.851 04/22/2024    INR 1.0 07/30/2018    HGBA1C 5.2 12/04/2023    LDLCALC 57.4 (L) 05/15/2024    GLU 83 05/15/2024    [id number] 60 12/06/2022                 Vital signs reviewed  Vitals:    05/22/24 1543   BP: 134/64   BP Location: Right arm   Patient Position: Sitting   BP Method: Medium (Manual)   Pulse: (!) 57   Temp: 98.2 °F (36.8 °C)   TempSrc: Oral   SpO2: 98%   Weight: 77 kg (169 lb 12.1 oz)   Height: 5' 6" (1.676 m)       Wt Readings from Last 4 Encounters:   05/22/24 77 kg (169 lb 12.1 oz)   04/22/24 74.8 kg (165 lb)   01/31/24 74.1 kg (163 lb 5.8 oz)   12/12/23 75.6 kg (166 lb 10.7 oz)       PE:   APPEARANCE: " Well nourished, well developed, in no acute distress.    HEAD: Normocephalic, atraumatic.  EYES: PERRL. EOMI.   Conjunctivae noninjected.  EARS: TM's intact. Light reflex normal. No retraction or perforation  NOSE: Mucosa pink. Airway clear.  MOUTH & THROAT: No tonsillar enlargement. No pharyngeal erythema or exudate.   NECK: Supple with no cervical lymphadenopathy.    CHEST: Good inspiratory effort. Lungs clear to auscultation with no wheezes or crackles.  CARDIOVASCULAR: Normal S1, S2. No rubs, murmurs, or gallops.  ABDOMEN: Bowel sounds normal. Not distended. Soft. No tenderness or masses. No organomegaly.  EXTREMITIES: No edema       IMPRESSION  1. Chest discomfort    2. Aortic atherosclerosis            PLAN  Chest comfort symptoms have improved with discontinuing her home made hot chocolate drink.  Suspect GERD as the etiology.  Continue Crestor given her atherosclerotic history.  Continue regular exercise.  Going to [place] next [place], will be walking a lot and standing a lot.  She feels like this will be a test to see if she has any exertional symptoms.  If any significant recurrence of pains, can get an updated baseline EKG, and if normal get plain treadmill stress test, and if baseline abnormal EKG can resubmit for stress echo.      SCREENINGS    Immunizations:  tdap 2015  Flu: up-to-date  Pneumococcal vaccination: [id number] 2018. PVX 2019  COVID Up-to-date  Zoster up-to-date    Age/Gender Appropriate screenings  Mmg 12/2023  dexa 08/21/2023: normal  Colonoscopy 12/05/2023: Normal

## 2024-08-09 ENCOUNTER — TELEPHONE (OUTPATIENT)
Dept: FAMILY MEDICINE | Facility: CLINIC | Age: 74
End: 2024-08-09
Payer: MEDICARE

## 2024-08-10 NOTE — PROGRESS NOTES
HPI    DLS: 3/26/2024    Pt here for 4 Month Check;  Pt states the heat is bothering her eyes.     Meds;  Cosopt BID OU  Pataday BID OU    1. POAG   2. NS OU   3. Hyperopia     Last edited by Reny Aguirre on 8/13/2024  2:57 PM.            Assessment /Plan     For exam results, see Encounter Report.    OAG (open angle glaucoma) suspect, high risk, bilateral  -     Posterior Segment OCT Optic Nerve- Both eyes  -     Color Fundus Photography - OU - Both Eyes; Future  -     Yang Visual Field - OU - Extended - Both Eyes; Future    Open angle with borderline findings and high glaucoma risk in both eyes  -     Posterior Segment OCT Optic Nerve- Both eyes  -     Color Fundus Photography - OU - Both Eyes; Future  -     Yang Visual Field - OU - Extended - Both Eyes; Future    Nuclear sclerotic cataract of both eyes    Hyperopia with presbyopia of both eyes           Glaucoma (type and duration)    First visit: unstaged OU vs glaucoma suspect on gtts    First HVF   8/9/22    First photos   - pending    Treatment / Drops started  - outside doctor             Family history    - + brother / great aunt and uncle         Glaucoma meds    cosopt        H/O adverse rxn to glaucoma drops    Latanoprost (burned eyes... soft CI)        LASERS    none        GLAUCOMA SURGERIES    none        OTHER EYE SURGERIES    none        CDR    07/0.8        Tbase    >20 (24)  (per pt)         Tmax    > 20  (21)   (per pt)         Ttarget    undetermined          HVF    3 test 2022 to 2023 - not reliable, paracentral/central defects od // inferior and ST defects os        Gonio    3+ w steep approach OU        CCT    583/579        OCT    3  test 2022 to 204 - RNFL - bord N od //  dec G/T , bord N os (? Mild prog ou)         Disc photos    Pending     - Ttoday  17/17  - Test done today   -IOP// gonio / AR/MR/BAT // OCT     2. Cataracts   - may be VS  - C/O GLARE WITH SUN LIGHT   - BCVA 20/30 ou // BATh 20/50 ou (8/13/2024)   - BCVA 2015 w/  Lakisha was 20/25 and 20/30   - monitor for now       3. Hyperopia / presbyopia       Try and get records from Dr. Longo    Cont cosopt ou bid     8/13/2024   - doing well, IOP well controlled on cosopt at 17 - 20  OU   - discussed early cataracts, nonVS, discussed what astigmatism is, and how needs correction w MRX OS- poor understanding but inquisitive. happy right now   - gonio + 3 ou   -RNFL OCT     RTC in 4 months IOP /   HVF / DFE / disc photos ( further evaluate the cataracts )    Not sure why she has the early persistent central changes on VF's ou

## 2024-08-13 ENCOUNTER — OFFICE VISIT (OUTPATIENT)
Dept: OPHTHALMOLOGY | Facility: CLINIC | Age: 74
End: 2024-08-13
Payer: MEDICARE

## 2024-08-13 DIAGNOSIS — H25.13 NUCLEAR SCLEROTIC CATARACT OF BOTH EYES: ICD-10-CM

## 2024-08-13 DIAGNOSIS — H40.023 OPEN ANGLE WITH BORDERLINE FINDINGS AND HIGH GLAUCOMA RISK IN BOTH EYES: ICD-10-CM

## 2024-08-13 DIAGNOSIS — H52.03 HYPEROPIA WITH PRESBYOPIA OF BOTH EYES: ICD-10-CM

## 2024-08-13 DIAGNOSIS — H40.023 OAG (OPEN ANGLE GLAUCOMA) SUSPECT, HIGH RISK, BILATERAL: Primary | ICD-10-CM

## 2024-08-13 DIAGNOSIS — H52.4 HYPEROPIA WITH PRESBYOPIA OF BOTH EYES: ICD-10-CM

## 2024-08-13 PROCEDURE — 99214 OFFICE O/P EST MOD 30 MIN: CPT | Mod: S$GLB,,, | Performed by: OPHTHALMOLOGY

## 2024-08-13 PROCEDURE — 1159F MED LIST DOCD IN RCRD: CPT | Mod: CPTII,S$GLB,, | Performed by: OPHTHALMOLOGY

## 2024-08-13 PROCEDURE — 1160F RVW MEDS BY RX/DR IN RCRD: CPT | Mod: CPTII,S$GLB,, | Performed by: OPHTHALMOLOGY

## 2024-08-13 PROCEDURE — 92133 CPTRZD OPH DX IMG PST SGM ON: CPT | Mod: S$GLB,,, | Performed by: OPHTHALMOLOGY

## 2024-08-13 PROCEDURE — 99999 PR PBB SHADOW E&M-EST. PATIENT-LVL III: CPT | Mod: PBBFAC,,, | Performed by: OPHTHALMOLOGY

## 2024-08-13 PROCEDURE — 1126F AMNT PAIN NOTED NONE PRSNT: CPT | Mod: CPTII,S$GLB,, | Performed by: OPHTHALMOLOGY

## 2024-08-28 RX ORDER — ALPRAZOLAM 0.5 MG/1
0.5 TABLET ORAL DAILY PRN
Qty: 30 TABLET | Refills: 0 | Status: SHIPPED | OUTPATIENT
Start: 2024-08-28

## 2024-08-28 NOTE — TELEPHONE ENCOUNTER
No care due was identified.  [facility] Embedded Care Due Messages. Reference number: [id number].   8/28/2024 12:33:16 PM CDT

## 2024-10-07 DIAGNOSIS — M25.562 LEFT KNEE PAIN, UNSPECIFIED CHRONICITY: Primary | ICD-10-CM

## 2024-10-07 DIAGNOSIS — Z96.652 AFTERCARE FOLLOWING LEFT KNEE JOINT REPLACEMENT SURGERY: ICD-10-CM

## 2024-10-07 DIAGNOSIS — Z47.1 AFTERCARE FOLLOWING LEFT KNEE JOINT REPLACEMENT SURGERY: ICD-10-CM

## 2024-10-08 ENCOUNTER — OFFICE VISIT (OUTPATIENT)
Dept: ORTHOPEDICS | Facility: CLINIC | Age: 74
End: 2024-10-08
Payer: MEDICARE

## 2024-10-08 ENCOUNTER — HOSPITAL ENCOUNTER (OUTPATIENT)
Dept: RADIOLOGY | Facility: HOSPITAL | Age: 74
Discharge: HOME | End: 2024-10-08
Attending: ORTHOPAEDIC SURGERY
Payer: MEDICARE

## 2024-10-08 VITALS — BODY MASS INDEX: 27.85 KG/M2 | HEIGHT: 66 IN | WEIGHT: 173.31 LBS

## 2024-10-08 DIAGNOSIS — Z47.1 AFTERCARE FOLLOWING LEFT KNEE JOINT REPLACEMENT SURGERY: ICD-10-CM

## 2024-10-08 DIAGNOSIS — M25.562 LEFT KNEE PAIN, UNSPECIFIED CHRONICITY: ICD-10-CM

## 2024-10-08 DIAGNOSIS — Z96.652 AFTERCARE FOLLOWING LEFT KNEE JOINT REPLACEMENT SURGERY: ICD-10-CM

## 2024-10-08 DIAGNOSIS — Z47.1 AFTERCARE FOLLOWING LEFT KNEE JOINT REPLACEMENT SURGERY: Primary | ICD-10-CM

## 2024-10-08 DIAGNOSIS — Z96.652 AFTERCARE FOLLOWING LEFT KNEE JOINT REPLACEMENT SURGERY: Primary | ICD-10-CM

## 2024-10-08 PROCEDURE — 99214 OFFICE O/P EST MOD 30 MIN: CPT | Mod: S$GLB,,, | Performed by: ORTHOPAEDIC SURGERY

## 2024-10-08 PROCEDURE — 1159F MED LIST DOCD IN RCRD: CPT | Mod: CPTII,S$GLB,, | Performed by: ORTHOPAEDIC SURGERY

## 2024-10-08 PROCEDURE — 73562 X-RAY EXAM OF KNEE 3: CPT | Mod: 26,LT,, | Performed by: STUDENT IN AN ORGANIZED HEALTH CARE EDUCATION/TRAINING PROGRAM

## 2024-10-08 PROCEDURE — 1125F AMNT PAIN NOTED PAIN PRSNT: CPT | Mod: CPTII,S$GLB,, | Performed by: ORTHOPAEDIC SURGERY

## 2024-10-08 PROCEDURE — 77073 BONE LENGTH STUDIES: CPT | Mod: TC,PN

## 2024-10-08 PROCEDURE — 77073 BONE LENGTH STUDIES: CPT | Mod: 26,,, | Performed by: STUDENT IN AN ORGANIZED HEALTH CARE EDUCATION/TRAINING PROGRAM

## 2024-10-08 PROCEDURE — 73562 X-RAY EXAM OF KNEE 3: CPT | Mod: TC,PN,LT

## 2024-10-08 PROCEDURE — G2211 COMPLEX E/M VISIT ADD ON: HCPCS | Mod: S$GLB,,, | Performed by: ORTHOPAEDIC SURGERY

## 2024-10-08 PROCEDURE — 1101F PT FALLS ASSESS-DOCD LE1/YR: CPT | Mod: CPTII,S$GLB,, | Performed by: ORTHOPAEDIC SURGERY

## 2024-10-08 PROCEDURE — 99999 PR PBB SHADOW E&M-EST. PATIENT-LVL III: CPT | Mod: PBBFAC,,, | Performed by: ORTHOPAEDIC SURGERY

## 2024-10-08 PROCEDURE — 3288F FALL RISK ASSESSMENT DOCD: CPT | Mod: CPTII,S$GLB,, | Performed by: ORTHOPAEDIC SURGERY

## 2024-10-08 NOTE — PROGRESS NOTES
[facility] [address]          Subjective:     Patient ID: Huber Pena is a 74 y.o. female.    Chief Complaint: Pain of the Left Knee    Patient is 6 years s/p  left primary total knee replacement    Anterior knee pain: no  Has improved pain  Is in physical therapy  no  Problems w incision  no  Is  happy with result  yes  Opiod free: yes    Patient recently fell about 2 weeks ago onto knee.  Reports some initial pain at that time but has since resolved.  Reports she is happy with her left knee able to perform all daily activities walking 3 miles daily she was reports occasional pain when trying to where high heels or going for walks longer than 3 miles      Past Medical History:   Diagnosis Date    Anxiety     Aortic atherosclerosis     Noted on imaging    Cataract     Chronic constipation     DDD (degenerative disc disease), lumbar     Fibroid tumor     Glaucoma     Hyperplastic colon polyp     Osteoarthritis of left knee     Osteopenia     Pes planus         Past Surgical History:   Procedure Laterality Date    BREAST CYST EXCISION      Breast reduction  2004    COLONOSCOPY N/A 10/10/2018    Procedure: COLONOSCOPY;  Surgeon: Cristal Whiteside MD;  Location: [facility] ENDO;  Service: Endoscopy;  Laterality: N/A;    COLONOSCOPY N/A 12/5/2023    Procedure: COLONOSCOPY;  Surgeon: Fernandez Little MD;  Location: [facility] ENDO;  Service: Endoscopy;  Laterality: N/A;    PERCUTANEOUS CRYOTHERAPY OF PERIPHERAL NERVE USING LIQUID NITROUS OXIDE IN CLOSED NEEDLE DEVICE Left 8/23/2018    Procedure: CRYOTHERAPY, NERVE, PERIPHERAL, PERCUTANEOUS, USING LIQUID NITROUS OXIDE IN CLOSED NEEDLE DEVICE;  Surgeon: FRANK Null;  Location: [facility] OR;  Service: Orthopedics;  Laterality: Left;    TOTAL KNEE ARTHROPLASTY Left     TOTAL REDUCTION MAMMOPLASTY      TUBAL LIGATION          Current Outpatient Medications   Medication Instructions    ALPRAZolam (XANAX) 0.5 mg, Oral, Daily PRN    clindamycin (CLEOCIN) 300 mg, As  needed (PRN)    clotrimazole-betamethasone 1-0.05% (LOTRISONE) cream Topical (Top), 2 times daily    dorzolamide (TRUSOPT) 2 % ophthalmic solution 1 drop, Both Eyes, 2 times daily    dorzolamide-timolol 2-0.5% (COSOPT) 22.3-6.8 mg/mL ophthalmic solution 1 drop, Both Eyes, 2 times daily    hydroquinone 4 % Crea Topical (Top), 2 times daily    ketotifen (ZADITOR) 0.025 % (0.035 %) ophthalmic solution 2 drops, Both Eyes, 2 times daily    meloxicam (MOBIC) 7.5 MG tablet TAKE 1 TABLET BY MOUTH EVERY DAY AS NEEDED    nystatin (MYCOSTATIN) powder Topical (Top), As needed (PRN)    rosuvastatin (CRESTOR) 10 mg, Oral, Daily    timolol maleate 0.5% (TIMOPTIC) 0.5 % Drop 1 drop, Both Eyes, 2 times daily        Review of patient's allergies indicates:   Allergen Reactions    Benadryl [diphenhydramine hcl]      Patient states she was in her 20's and had some type of reaction but does not remember exactly what it was.         Social History     Socioeconomic History    Marital status:    Tobacco Use    Smoking status: Never    Smokeless tobacco: Never   Substance and Sexual Activity    Alcohol use: No     Comment: yearly on occasions    Drug use: No    Sexual activity: Yes     Partners: Male     Birth control/protection: Post-menopausal     Comment:      Social Drivers of Health     Financial Resource Strain: Low Risk  (4/17/2024)    Overall Financial Resource Strain (CARDIA)     Difficulty of Paying Living Expenses: Not hard at all   Food Insecurity: No Food Insecurity (4/17/2024)    Hunger Vital Sign     Worried About Running Out of Food in the Last Year: Never true     Ran Out of Food in the Last Year: Never true   Transportation Needs: No Transportation Needs (4/17/2024)    PRAPARE - Transportation     Lack of Transportation (Medical): No     Lack of Transportation (Non-Medical): No   Physical Activity: Unknown (4/17/2024)    Exercise Vital Sign     Days of Exercise per Week: 5 days   Stress: No Stress Concern  Present (4/17/2024)    [employer] - Occupational Stress Questionnaire     Feeling of Stress : Not at all   Housing Stability: Low Risk  (12/28/2022)    Housing Stability Vital Sign     Unable to Pay for Housing in the Last Year: No     Number of Places Lived in the Last Year: 1     Unstable Housing in the Last Year: No       Family History   Problem Relation Name Age of Onset    Diabetes Mother      No Known Problems Father      Diabetes Sister [place]         x2    Breast cancer Sister [place]     No Known Problems Sister Gracy     No Known Problems Sister Quita Tung     Hypertension Brother Laurent     Seizures Brother Sean     Allergies Brother Dequan     No Known Problems Brother Ashok     No Known Problems Brother Johan     Stroke Brother Louis     Depression Daughter Cherie     Anxiety disorder Daughter Cherie     No Known Problems Daughter Jinny     Anxiety disorder Son      Breast cancer Maternal Aunt      No Known Problems Maternal Uncle      No Known Problems Paternal Aunt      No Known Problems Paternal Uncle      No Known Problems Maternal Grandmother      No Known Problems Maternal Grandfather      No Known Problems Paternal Grandmother      No Known Problems Paternal Grandfather      Colon cancer Cousin      Amblyopia Neg Hx      Blindness Neg Hx      Cancer Neg Hx      Cataracts Neg Hx      Glaucoma Neg Hx      Macular degeneration Neg Hx      Retinal detachment Neg Hx      Strabismus Neg Hx      Thyroid disease Neg Hx      Melanoma Neg Hx           Review of systems negative except for noted in HPI    Objective:   Physical Exam:     left knee  Surgical incision well healed with no signs of infection  no effusion  No tenderness about knee  ROM 0-115  Grossly NVI distally    Imaging:  X-rays obtained this clinic visit reviewed. Components appropriately placed. No evidence of hardware complication.  Neutral alignment on hip knee ankle      Assessment:        Huber SWANSON  Jean is a 74 y.o. female s/p L TKA 6 years ago in 2018      Plan :      Overall patient appears to be doing well and is happy with the result of the knee arthroplasty. They can continue activities as tolerated avoiding high impact activities.  I would like to see the patient back In 2 years with xrays.       Fernandez Puente MD  [facility] PGY-3

## 2024-10-22 ENCOUNTER — NURSE TRIAGE (OUTPATIENT)
Dept: ADMINISTRATIVE | Facility: CLINIC | Age: 74
End: 2024-10-22
Payer: MEDICARE

## 2024-10-22 NOTE — TELEPHONE ENCOUNTER
OOC RN  Patient is calling about chest pain and can't even standing  I[ and weakness.       Dr Clayton know about issue.  And states see would like to know what is wrong with her todayl  Happens when she stands. Intermittent pain whjen she stands up.  Patient states she cannot stand long  Care Advise is to call 911.  [facility].    Reason for Disposition   Chest pain lasting longer than 5 minutes and over 43 years old    Additional Information   Negative: SEVERE difficulty breathing (e.g., struggling for each breath, speaks in single words)   Negative: Difficult to awaken or acting confused (e.g., disoriented, slurred speech)   Negative: Shock suspected (e.g., cold/pale/clammy skin, too weak to stand, low BP, rapid pulse)   Negative: Passed out (e.g., fainted, lost consciousness, blacked out and was not responding)    Protocols used: Chest Pain-A-[place]

## 2024-10-25 ENCOUNTER — OFFICE VISIT (OUTPATIENT)
Dept: URGENT CARE | Facility: CLINIC | Age: 74
End: 2024-10-25
Payer: MEDICARE

## 2024-10-25 ENCOUNTER — OFFICE VISIT (OUTPATIENT)
Dept: CARDIOLOGY | Facility: CLINIC | Age: 74
End: 2024-10-25
Payer: MEDICARE

## 2024-10-25 ENCOUNTER — LAB VISIT (OUTPATIENT)
Dept: LAB | Facility: HOSPITAL | Age: 74
End: 2024-10-25
Attending: INTERNAL MEDICINE
Payer: MEDICARE

## 2024-10-25 VITALS
OXYGEN SATURATION: 98 % | HEART RATE: 63 BPM | SYSTOLIC BLOOD PRESSURE: 134 MMHG | HEIGHT: 66 IN | TEMPERATURE: 98 F | WEIGHT: 171.94 LBS | BODY MASS INDEX: 27.63 KG/M2 | RESPIRATION RATE: 16 BRPM | DIASTOLIC BLOOD PRESSURE: 75 MMHG

## 2024-10-25 VITALS
DIASTOLIC BLOOD PRESSURE: 76 MMHG | HEIGHT: 66 IN | BODY MASS INDEX: 27.4 KG/M2 | WEIGHT: 170.5 LBS | HEART RATE: 52 BPM | SYSTOLIC BLOOD PRESSURE: 134 MMHG

## 2024-10-25 DIAGNOSIS — Z13.6 ENCOUNTER FOR SCREENING FOR CARDIOVASCULAR DISORDERS: Primary | ICD-10-CM

## 2024-10-25 DIAGNOSIS — I70.0 AORTIC ATHEROSCLEROSIS: ICD-10-CM

## 2024-10-25 DIAGNOSIS — R07.89 CHEST DISCOMFORT: ICD-10-CM

## 2024-10-25 DIAGNOSIS — Z13.6 ENCOUNTER FOR SCREENING FOR CARDIOVASCULAR DISORDERS: ICD-10-CM

## 2024-10-25 DIAGNOSIS — R07.9 CHEST PAIN, UNSPECIFIED TYPE: Primary | ICD-10-CM

## 2024-10-25 LAB
CREAT SERPL-MCNC: 0.7 MG/DL (ref 0.5–1.4)
EST. GFR  (NO RACE VARIABLE): >60 ML/MIN/1.73 M^2

## 2024-10-25 PROCEDURE — 1125F AMNT PAIN NOTED PAIN PRSNT: CPT | Mod: CPTII,S$GLB,, | Performed by: INTERNAL MEDICINE

## 2024-10-25 PROCEDURE — 3075F SYST BP GE 130 - 139MM HG: CPT | Mod: CPTII,S$GLB,, | Performed by: INTERNAL MEDICINE

## 2024-10-25 PROCEDURE — 3288F FALL RISK ASSESSMENT DOCD: CPT | Mod: CPTII,S$GLB,, | Performed by: INTERNAL MEDICINE

## 2024-10-25 PROCEDURE — 82565 ASSAY OF CREATININE: CPT | Performed by: INTERNAL MEDICINE

## 2024-10-25 PROCEDURE — 99203 OFFICE O/P NEW LOW 30 MIN: CPT | Mod: S$GLB,,, | Performed by: INTERNAL MEDICINE

## 2024-10-25 PROCEDURE — 1159F MED LIST DOCD IN RCRD: CPT | Mod: CPTII,S$GLB,, | Performed by: INTERNAL MEDICINE

## 2024-10-25 PROCEDURE — 1160F RVW MEDS BY RX/DR IN RCRD: CPT | Mod: CPTII,S$GLB,, | Performed by: INTERNAL MEDICINE

## 2024-10-25 PROCEDURE — 99999 PR PBB SHADOW E&M-EST. PATIENT-LVL III: CPT | Mod: PBBFAC,,, | Performed by: INTERNAL MEDICINE

## 2024-10-25 PROCEDURE — 3078F DIAST BP <80 MM HG: CPT | Mod: CPTII,S$GLB,, | Performed by: INTERNAL MEDICINE

## 2024-10-25 PROCEDURE — 1101F PT FALLS ASSESS-DOCD LE1/YR: CPT | Mod: CPTII,S$GLB,, | Performed by: INTERNAL MEDICINE

## 2024-10-25 PROCEDURE — 36415 COLL VENOUS BLD VENIPUNCTURE: CPT | Mod: PO | Performed by: INTERNAL MEDICINE

## 2024-10-25 NOTE — PROGRESS NOTES
Subjective:     Chief Complaint:  Huber Pena is a 74 y.o. female referred by Ney Clayton MD for evaluation of Chest Pain.    Problem List and HPI:   Chest discomfort  Hypercholesterolemia    She reports chest discomfort in the midsternal region without radiation.  It occurs when she is standing for long periods of time.  It does not occur with exertion.  Untreated total cholesterol was 197-225 with an LDL of 108-137 mg/dl.  HDL is > 60 and triglycerides are excellent.  On 10 mg of rosuvastatin her total cholesterol is down to 138 with the LDL < 70.        Review of patient's allergies indicates:   Allergen Reactions    Benadryl [diphenhydramine hcl]      Patient states she was in her 20's and had some type of reaction but does not remember exactly what it was.          Current Outpatient Medications   Medication Sig    ALPRAZolam (XANAX) 0.5 MG tablet TAKE 1 TABLET BY MOUTH EVERY DAY AS NEEDED FOR ANXIETY    clindamycin (CLEOCIN) 150 MG capsule Take 300 mg by mouth as needed. Prior to dental work    clotrimazole-betamethasone 1-0.05% (LOTRISONE) cream Apply topically 2 (two) times daily. (Patient taking differently: Apply topically as needed.)    dorzolamide-timolol 2-0.5% (COSOPT) 22.3-6.8 mg/mL ophthalmic solution Place 1 drop into both eyes 2 (two) times daily.    ketotifen (ZADITOR) 0.025 % (0.035 %) ophthalmic solution PLACE 2 DROPS INTO BOTH EYES 2 (TWO) TIMES DAILY.    meloxicam (MOBIC) 7.5 MG tablet TAKE 1 TABLET BY MOUTH EVERY DAY AS NEEDED    nystatin (MYCOSTATIN) powder Apply topically as needed.    rosuvastatin (CRESTOR) 10 MG tablet Take 1 tablet (10 mg total) by mouth once daily.     No current facility-administered medications for this visit.         Past Medical and Surgical history:  Huber Pena  has a past medical history of Anxiety, Aortic atherosclerosis, Cataract, Chronic constipation, DDD (degenerative disc disease), lumbar, Fibroid tumor, Glaucoma, Hyperplastic colon polyp,  "Osteoarthritis of left knee, Osteopenia, and Pes planus.   Past Surgical History:   Procedure Laterality Date    BREAST CYST EXCISION      Breast reduction  2004    COLONOSCOPY N/A 10/10/2018    Procedure: COLONOSCOPY;  Surgeon: Cristal Whiteside MD;  Location: [facility] ENDO;  Service: Endoscopy;  Laterality: N/A;    COLONOSCOPY N/A 12/5/2023    Procedure: COLONOSCOPY;  Surgeon: Fernandez Little MD;  Location: [facility] ENDO;  Service: Endoscopy;  Laterality: N/A;    PERCUTANEOUS CRYOTHERAPY OF PERIPHERAL NERVE USING LIQUID NITROUS OXIDE IN CLOSED NEEDLE DEVICE Left 8/23/2018    Procedure: CRYOTHERAPY, NERVE, PERIPHERAL, PERCUTANEOUS, USING LIQUID NITROUS OXIDE IN CLOSED NEEDLE DEVICE;  Surgeon: FRANK Null;  Location: [facility] OR;  Service: Orthopedics;  Laterality: Left;    TOTAL KNEE ARTHROPLASTY Left     TOTAL REDUCTION MAMMOPLASTY      TUBAL LIGATION         Social history:  Huber Pena  reports that she has never smoked. She has never used smokeless tobacco. She reports that she does not drink alcohol and does not use drugs.    Family history:  Huber's family history includes Allergies in her brother; Anxiety disorder in her daughter and son; Breast cancer in her maternal aunt and sister; Colon cancer in her cousin; Depression in her daughter; Diabetes in her mother and sister; Hypertension in her brother; No Known Problems in her brother, brother, daughter, father, maternal grandfather, maternal grandmother, maternal uncle, paternal aunt, paternal grandfather, paternal grandmother, paternal uncle, sister, and sister; Seizures in her brother; Stroke in her brother.      Objective:   /76   Pulse (!) 52   Ht 5' 6" (1.676 m)   Wt 77.3 kg (170 lb 8.4 oz)   LMP  (LMP Unknown)   BMI 27.52 kg/m²    Physical Exam  Constitutional:       Appearance: Normal appearance. She is well-developed.   Neck:      Vascular: No JVD.   Cardiovascular:      Rate and Rhythm: Normal rate and regular rhythm.      " Pulses:           Radial pulses are 2+ on the right side and 2+ on the left side.      Heart sounds: S1 normal and S2 normal. No murmur heard.  Pulmonary:      Breath sounds: No decreased breath sounds, wheezing or rales.   Chest:      Chest wall: There is no dullness to percussion.   Abdominal:      Palpations: Abdomen is soft. There is no hepatomegaly or splenomegaly.      Tenderness: There is no abdominal tenderness.   Musculoskeletal:      Right lower leg: No edema.      Left lower leg: No edema.   Skin:     General: Skin is warm.      Findings: No bruising.      Nails: There is no clubbing.   Neurological:      Mental Status: She is alert and oriented to person, place, and time.   Psychiatric:         Speech: Speech normal.         Behavior: Behavior normal.           Labs  Lipids:  Recent Labs   Lab 05/15/24  0703   Cholesterol 138   LDL Cholesterol 57.4 L   HDL 70      Renal:  Recent Labs   Lab 05/15/24  0703   Creatinine 0.8   Potassium 4.3   CO2 22 L   BUN 12     Liver:  Recent Labs   Lab 05/15/24  0703   AST 25   ALT 22     Cbc:    Lab Results   Component Value Date    WBC 6.32 04/22/2024    HGB 13.4 04/22/2024    HCT 40.1 04/22/2024    MCV 95 04/22/2024     04/22/2024         Assessment and Plan:       ICD-10-CM ICD-9-CM   1. Encounter for screening for cardiovascular disorders  Z13.6 V81.2   2. Aortic atherosclerosis  I70.0 440.0   3. Chest discomfort  R07.89 786.59        Chest discomfort not likely to be due to obstructive coronary heart disease.  In view of hypercholesterolemia, age and aortic atherosclerosis, it is reasonable to proceed with further testing.  We discussed various forms of stress testing and cardiac CTA.  I recommended a cardiac CTA.    Orders entered during this encounter:    Encounter for screening for cardiovascular disorders  -     CTA Cardiac; Future; Expected date: 10/25/2024  -     Creatinine, serum; Future; Expected date: 10/25/2024    Aortic atherosclerosis    Chest  discomfort         Follow up if symptoms worsen or fail to improve.

## 2024-10-25 NOTE — PROGRESS NOTES
"Subjective:      Patient ID: Huber Pena is a 74 y.o. female.    Vitals:  height is 5' 6" (1.676 m) and weight is 78 kg (171 lb 15.3 oz). Her oral temperature is 97.7 °F (36.5 °C). Her blood pressure is 134/75 and her pulse is 63. Her respiration is 16 and oxygen saturation is 98%.     Chief Complaint: Chest Pain    Pt present with chest pain, fatigue, States when she stands for long period of time she feels chest pain. Sx started over a year. Tx include tylenol with no relief.   Provider note below:  This is a 74 y.o. female who presents today with a chief complaint of chest pain that is ongoing for past 1 and half year, patient reports she only feels chest pain/discomfort when she stands for longer period of time that prompts her to sit down, patient reports chest pain/discomfort resolves after she sits down,  denies radiating neck or arm pain, denies orthopnea, denies leg swelling, patient being seen by her primary for the same complaints within the past year had lab work done and also stress and echo test was ordered which was denied by her insurance, patient reports recently her  also had the valve replacement surgery and she is the one who will be taking care for him so she wanted to make sure she will get her checked out as her symptoms are going for 1 and half year,  patient reported that she used to go to gym 3 times per week but recently due to her  surgery she is not going to gym lately but did walks and walked 5 miles today early in the morning, patient reports her daughter is here to help her out, patient also reports fatigue and also the epigastric pain on and off for several years, patient with history of sinus bradycardia EKG done in 2018 with early repolarization, she does have aortic atherosclerosis and takes Crestor 10 mg daily, at 1 point during this 1 year time.  She was drinking dark cocoa hot chocolate drink every day and induced that her pain might be related to coffee " "intake so she stopped drinking dark hot chocolate, and noticed somewhat her symptoms improved but not completely, patient denies any shortness of breath during her episodes of feeling the chest pain/discomfort when she stands, patient reports she might be cooking in the kitchen and feels the pain "that forces me to sit down",  denies fever, body aches or chills, denies cough, wheezing or shortness of breath, denies nausea, vomiting, diarrhea or abdominal pain, denies  dizziness positional lightheadedness, denies sore throat or trouble swallowing, denies loss of taste or smell, or any other symptoms       Chest Pain   This is a chronic problem. The current episode started more than 1 year ago. The onset quality is sudden. The problem occurs constantly. The problem has been waxing and waning. The pain is at a severity of 0/10. The patient is experiencing no pain. The pain does not radiate. Pertinent negatives include no diaphoresis, dizziness, fever, headaches, numbness, PND, shortness of breath or sputum production. The pain is aggravated by nothing. She has tried acetaminophen for the symptoms. The treatment provided no relief.     Constitution: Negative for sweating and fever.   Cardiovascular:  Positive for chest pain.   Respiratory:  Negative for sputum production and shortness of breath.    Neurological:  Negative for dizziness, headaches and numbness.      Past Medical History:   Diagnosis Date    Anxiety     Aortic atherosclerosis     Noted on imaging    Cataract     Chronic constipation     DDD (degenerative disc disease), lumbar     Fibroid tumor     Glaucoma     Hyperplastic colon polyp     Osteoarthritis of left knee     Osteopenia     Pes planus        Objective:     Physical Exam   Constitutional: She is oriented to person, place, and time. She appears well-developed. She is cooperative.  Non-toxic appearance. She does not appear ill. No distress.      Comments:Patient sitting comfortably on the exam " table, non toxic appearance  and answering questions appropriately, no acute distress        HENT:   Head: Normocephalic and atraumatic.   Ears:   Right Ear: Hearing, tympanic membrane, external ear and ear canal normal.   Left Ear: Hearing, tympanic membrane, external ear and ear canal normal.   Nose: Nose normal. No mucosal edema, rhinorrhea or nasal deformity. No epistaxis. Right sinus exhibits no maxillary sinus tenderness and no frontal sinus tenderness. Left sinus exhibits no maxillary sinus tenderness and no frontal sinus tenderness.   Mouth/Throat: Uvula is midline, oropharynx is clear and moist and mucous membranes are normal. No trismus in the jaw. Normal dentition. No uvula swelling. No posterior oropharyngeal erythema.   Eyes: Conjunctivae and lids are normal. Right eye exhibits no discharge. Left eye exhibits no discharge. No scleral icterus.   Neck: Trachea normal and phonation normal. Neck supple.   Cardiovascular: Normal rate, regular rhythm, normal heart sounds and normal pulses.      Comments: EKG sinus bradycardia Vent rate 52   Pulmonary/Chest: Effort normal and breath sounds normal. No respiratory distress.   Abdominal: Normal appearance and bowel sounds are normal. She exhibits no distension and no mass. Soft. There is no abdominal tenderness.   Musculoskeletal: Normal range of motion.         General: No deformity. Normal range of motion.   Neurological: She is alert and oriented to person, place, and time. She exhibits normal muscle tone. Coordination normal.   Skin: Skin is warm, dry, intact, not diaphoretic and not pale.   Psychiatric: Her speech is normal and behavior is normal. Judgment and thought content normal.   Nursing note and vitals reviewed.    EKG: unchanged from previous tracings, sinus bradycardia.        Patient in no acute distress.  Vitals reassuring.  Discussed results/diagnosis/plan in depth with patient in clinic. Strict precautions given to patient to monitor for  worsening signs and symptoms. Advised to follow up with primary.All questions answered. Strict ER precautions given. If your symptoms worsens or fail to improve you should go to the Emergency Room. Discharge and follow-up instructions given verbally/printed. Discharge and follow-up instructions discussed with the patient who expressed understanding and willingness to comply with my recommendations.Patient voiced understanding and in agreement with current treatment plan.     Please be advised this text was dictated with [employer] software and may contain errors due to translation.   Assessment:     1. Chest pain, unspecified type        Plan:       Chest pain, unspecified type  -     IN OFFICE EKG 12-LEAD (to [facility])  -     Ambulatory referral/consult to Cardiology          Medical Decision Making:   History:   Old Medical Records: I decided to obtain old medical records.  Old Records Summarized: records from clinic visits and records from previous admission(s).  Urgent Care Management:  Patient in no acute distress.  Vitals reassuring.  On exam, patient is nontoxic appearing and afebrile.  Lungs CTA.  Patient presented with complaint of chest pain/discomfort ongoing for past 1-1/2 year.  Being seen by her primary had the echo and stress test order that was declined by the insurance.  EKG sinus bradycardia ventricular rate 52 which is also noted by her previous visits and EKG.  Patient denies any radiating neck or arm pain.  I did discuss EKG results with patient in depth and strongly believe that she will need further evaluation given her age and history of aortic atherosclerosis she will need further evaluation and follow-up with specialist/cardiology.  I did refer patient to Cardiology and schedule appointment for 10/25/2024 at 10:00 a.m..  Appointment time and location given to patient.  Red flags discussed with patient in depth.  over-the-counter medication discussed with patient at length.  Proper hydration  advised.  I reiterated the importance of further evaluation if no improvement symptoms and follow-up with primary.        Patient Instructions   If your condition worsens or fails to improve we recommend that you receive another evaluation at the ER immediately or contact your PCP to discuss your concerns or return here. You must understand that you've received an urgent care treatment only and that you may be released before all your medical problems are known or treated. You the patient will arrange for followup care as instructed.    If you were prescribed antibiotics, please take them to completion.  If you were prescribed a narcotic medication, do not drive or operate heavy equipment or machinery while taking these medications.  Please follow up with your primary care doctor or specialist as needed.  If you  smoke, please stop smoking.

## 2024-10-26 LAB
OHS QRS DURATION: 86 MS
OHS QTC CALCULATION: 414 MS

## 2024-11-01 ENCOUNTER — TELEPHONE (OUTPATIENT)
Dept: FAMILY MEDICINE | Facility: CLINIC | Age: 74
End: 2024-11-01
Payer: MEDICARE

## 2024-11-04 ENCOUNTER — TELEPHONE (OUTPATIENT)
Dept: CARDIOLOGY | Facility: HOSPITAL | Age: 74
End: 2024-11-04
Payer: MEDICARE

## 2024-11-04 NOTE — TELEPHONE ENCOUNTER
Call made to pt regarding CT/CTA scheduled for 11-6  @12 noon at [facility]. Instructed on no food 4 hours prior to the test. Please hydrate well and have at least 16 oz water before and after your test. Avoid caffeinne at least 12 hours prior to your test (preferably more) this includes coffee, tea, chocolate even decaf has a small amount in it.  . Take all meds  as prescribed.  Directions given to [facility], and my number  to call if she has any questions.  Pt verbalizes understanding of above instructions.

## 2024-11-05 ENCOUNTER — TELEPHONE (OUTPATIENT)
Dept: FAMILY MEDICINE | Facility: CLINIC | Age: 74
End: 2024-11-05
Payer: MEDICARE

## 2024-11-05 NOTE — TELEPHONE ENCOUNTER
Pt was just letting us know she ended up at urgent care on [address] and they sent her to cardiology told her she needed a ct scan and is having that CT tomorrow

## 2024-11-05 NOTE — TELEPHONE ENCOUNTER
----- Message from Helena sent at 11/1/2024  4:21 PM CDT -----  Type:  Patient Returning Call    Who Called:pt  Who Left Message for Patient:cristiana  Does the patient know what this is regarding?:returning call  Would the patient rather a call back or a response via [facility]? call  Best Call Back Number:[phone]  Additional Information:

## 2024-11-06 ENCOUNTER — HOSPITAL ENCOUNTER (OUTPATIENT)
Dept: RADIOLOGY | Facility: HOSPITAL | Age: 74
Discharge: HOME | End: 2024-11-06
Attending: INTERNAL MEDICINE
Payer: MEDICARE

## 2024-11-06 VITALS
SYSTOLIC BLOOD PRESSURE: 160 MMHG | DIASTOLIC BLOOD PRESSURE: 74 MMHG | RESPIRATION RATE: 16 BRPM | OXYGEN SATURATION: 95 % | HEART RATE: 62 BPM

## 2024-11-06 DIAGNOSIS — Z13.6 ENCOUNTER FOR SCREENING FOR CARDIOVASCULAR DISORDERS: ICD-10-CM

## 2024-11-06 PROCEDURE — 75574 CT ANGIO HRT W/3D IMAGE: CPT | Mod: TC

## 2024-11-06 PROCEDURE — 25500020 PHARM REV CODE 255: Performed by: INTERNAL MEDICINE

## 2024-11-06 PROCEDURE — 25000242 PHARM REV CODE 250 ALT 637 W/ HCPCS: Performed by: INTERNAL MEDICINE

## 2024-11-06 PROCEDURE — 75574 CT ANGIO HRT W/3D IMAGE: CPT | Mod: 26,,, | Performed by: STUDENT IN AN ORGANIZED HEALTH CARE EDUCATION/TRAINING PROGRAM

## 2024-11-06 RX ORDER — METOPROLOL TARTRATE 1 MG/ML
5 INJECTION, SOLUTION INTRAVENOUS EVERY 5 MIN PRN
Status: DISCONTINUED | OUTPATIENT
Start: 2024-11-06 | End: 2024-11-07 | Stop reason: HOSPADM

## 2024-11-06 RX ORDER — NITROGLYCERIN 0.4 MG/1
0.4 TABLET SUBLINGUAL ONCE
Status: COMPLETED | OUTPATIENT
Start: 2024-11-06 | End: 2024-11-06

## 2024-11-06 RX ADMIN — IOHEXOL 100 ML: 350 INJECTION, SOLUTION INTRAVENOUS at 12:11

## 2024-11-06 RX ADMIN — NITROGLYCERIN 0.4 MG: 0.4 TABLET SUBLINGUAL at 12:11

## 2024-11-06 NOTE — NURSING
Pt VSS post CTA study. Pt exhibiting no adverse effects from medication. Pt able to adequately ambulate with no dizziness or SOB. IV removed, site dressed with guaze and coban. [facility]. Pt educated on post study instructions. Verbalized understanding.

## 2024-11-06 NOTE — NURSING
Pt arrived to [facility] for cardiac CTA.  Pt AAOx4, ambulatory, no distress noted. Pt informed of procedure, need for PIV and side effects of contrast and nitroglycerin.  Pt connected for continuous vital sign monitoring. VS assessed and put in flowsheets.  IV in place.  Pt verbalizes understanding.

## 2024-11-21 ENCOUNTER — TELEPHONE (OUTPATIENT)
Dept: CARDIOLOGY | Facility: CLINIC | Age: 74
End: 2024-11-21
Payer: MEDICARE

## 2024-11-21 NOTE — TELEPHONE ENCOUNTER
Mrs. Pena is asking about results of CTA done on 11/6. She is active on the portal and would like results.

## 2024-11-21 NOTE — TELEPHONE ENCOUNTER
----- Message from Mima sent at 11/21/2024  4:30 PM CST -----  Regarding: results  Pt is calling for CT results from 11/6/24 and can be reached at [phone].      Thank you

## 2024-11-22 RX ORDER — ROSUVASTATIN CALCIUM 10 MG/1
10 TABLET, COATED ORAL DAILY
Qty: 90 TABLET | Refills: 1 | Status: SHIPPED | OUTPATIENT
Start: 2024-11-22

## 2024-11-22 NOTE — TELEPHONE ENCOUNTER
No care due was identified.  [facility] Embedded Care Due Messages. Reference number: [id number].   11/22/2024 2:30:55 AM CST

## 2024-11-22 NOTE — TELEPHONE ENCOUNTER
Refill Decision Note   Huber Pena  is requesting a refill authorization.  Brief Assessment and Rationale for Refill:  Approve     Medication Therapy Plan:         Comments:     Note composed:2:37 AM 11/22/2024

## 2024-11-26 ENCOUNTER — PATIENT MESSAGE (OUTPATIENT)
Dept: CARDIOLOGY | Facility: CLINIC | Age: 74
End: 2024-11-26
Payer: MEDICARE

## 2024-12-07 NOTE — PROGRESS NOTES
HPI     Glaucoma            Comments: HVF review today and pt states no changes since last exam           Comments    DLS: 8/13/24    1. OAG Suspect OU  2. NS OU  3. Hyperopia and Presbyopia    MEDS:  Cosopt BID OU  Zaditor PRN OU          Last edited by Katy Carr MA on 12/10/2024  1:24 PM.            Assessment /Plan     For exam results, see Encounter Report.    Open angle with borderline findings and high glaucoma risk in both eyes  -     Yang Visual Field - OU - Extended - Both Eyes  -     Color Fundus Photography - OU - Both Eyes    Nuclear sclerotic cataract of both eyes    Hyperopia with presbyopia of both eyes    OAG (open angle glaucoma) suspect, high risk, bilateral  -     Yang Visual Field - OU - Extended - Both Eyes  -     Color Fundus Photography - OU - Both Eyes           Glaucoma (type and duration)    First visit: unstaged OU vs glaucoma suspect on gtts    First HVF   8/9/22    First photos   - pending    Treatment / Drops started  - outside doctor             Family history    - + brother / great aunt and uncle         Glaucoma meds    cosopt        H/O adverse rxn to glaucoma drops    Latanoprost (burned eyes... soft CI)        LASERS    none        GLAUCOMA SURGERIES    none        OTHER EYE SURGERIES    none        CDR    07/0.85        Tbase    >20 (24)  (per pt)         Tmax    > 20  (21)   (per pt)         Ttarget    undetermined          HVF    4 test 2022 to 2024 - not reliable, paracentral/central defects od // inferior and ST defects os        Gonio    3+ w steep approach OU        CCT    583/579        OCT    3  test 2022 to 204 - RNFL - bord N od //  dec G/T , bord N os (? Mild prog ou)         Disc photos    12/2024     - Ttoday  23/22 ( IOP up from 17/17- has a sinus infection and feeling a lot of pressure)   - Test done today   -IOP//  HVF ou // DFE / disc photos     2. Cataracts   - may be VS  - C/O GLARE WITH SUN LIGHT   - BCVA 20/30 ou // BATh 20/50 ou (8/13/2024)   -  BCVA 2015 w/ Lakisha was 20/25 and 20/30   - monitor for now       3. Hyperopia / presbyopia     Try and get records from Dr. Longo    Cont cosopt ou bid     8/13/2024   - doing well, IOP well controlled on cosopt at 17 - 20  OU   - discussed early cataracts, nonVS, discussed what astigmatism is, and how needs correction w MRX OS- poor understanding but inquisitive. happy right now   - gonio + 3 ou   -RNFL OCT     12/10/2024   IOP up - rec add a 2nd drop   -  states she has a sinus infection she Wants to recheck one more time before she escalates therapy   Cont cosopt bid ou     Rx for glasses  - only a mild change - may want distance only for driving or may want to get another pair of bifocals - she has insurance money for glasses to use up before the end of the year       F/U 4 months with IOP and gonio - if IOP still up consider SLT vs additional drops (she thinks her  has a laser for eye pressure)

## 2024-12-10 ENCOUNTER — OFFICE VISIT (OUTPATIENT)
Dept: OPHTHALMOLOGY | Facility: CLINIC | Age: 74
End: 2024-12-10
Payer: MEDICARE

## 2024-12-10 ENCOUNTER — LAB VISIT (OUTPATIENT)
Dept: LAB | Facility: HOSPITAL | Age: 74
End: 2024-12-10
Attending: FAMILY MEDICINE
Payer: MEDICARE

## 2024-12-10 ENCOUNTER — CLINICAL SUPPORT (OUTPATIENT)
Dept: OPHTHALMOLOGY | Facility: CLINIC | Age: 74
End: 2024-12-10
Payer: MEDICARE

## 2024-12-10 DIAGNOSIS — I70.0 AORTIC ATHEROSCLEROSIS: ICD-10-CM

## 2024-12-10 DIAGNOSIS — H25.13 NUCLEAR SCLEROTIC CATARACT OF BOTH EYES: ICD-10-CM

## 2024-12-10 DIAGNOSIS — Z79.899 OTHER LONG TERM (CURRENT) DRUG THERAPY: ICD-10-CM

## 2024-12-10 DIAGNOSIS — H40.023 OAG (OPEN ANGLE GLAUCOMA) SUSPECT, HIGH RISK, BILATERAL: ICD-10-CM

## 2024-12-10 DIAGNOSIS — M85.80 OSTEOPENIA, UNSPECIFIED LOCATION: ICD-10-CM

## 2024-12-10 DIAGNOSIS — H52.4 HYPEROPIA WITH PRESBYOPIA OF BOTH EYES: ICD-10-CM

## 2024-12-10 DIAGNOSIS — E55.9 VITAMIN D INSUFFICIENCY: ICD-10-CM

## 2024-12-10 DIAGNOSIS — Z00.00 ROUTINE GENERAL MEDICAL EXAMINATION AT A HEALTH CARE FACILITY: ICD-10-CM

## 2024-12-10 DIAGNOSIS — H40.023 OPEN ANGLE WITH BORDERLINE FINDINGS AND HIGH GLAUCOMA RISK IN BOTH EYES: Primary | ICD-10-CM

## 2024-12-10 DIAGNOSIS — H52.03 HYPEROPIA WITH PRESBYOPIA OF BOTH EYES: ICD-10-CM

## 2024-12-10 LAB
25(OH)D3+25(OH)D2 SERPL-MCNC: 42 NG/ML (ref 30–96)
ALBUMIN SERPL BCP-MCNC: 3.8 G/DL (ref 3.5–5.2)
ALP SERPL-CCNC: 86 U/L (ref 40–150)
ALT SERPL W/O P-5'-P-CCNC: 21 U/L (ref 10–44)
ANION GAP SERPL CALC-SCNC: 9 MMOL/L (ref 8–16)
AST SERPL-CCNC: 21 U/L (ref 10–40)
BASOPHILS # BLD AUTO: 0.04 K/UL (ref 0–0.2)
BASOPHILS NFR BLD: 0.5 % (ref 0–1.9)
BILIRUB SERPL-MCNC: 0.8 MG/DL (ref 0.1–1)
BUN SERPL-MCNC: 12 MG/DL (ref 8–23)
CALCIUM SERPL-MCNC: 9.6 MG/DL (ref 8.7–10.5)
CHLORIDE SERPL-SCNC: 109 MMOL/L (ref 95–110)
CHOLEST SERPL-MCNC: 135 MG/DL (ref 120–199)
CHOLEST/HDLC SERPL: 1.9 {RATIO} (ref 2–5)
CO2 SERPL-SCNC: 23 MMOL/L (ref 23–29)
CREAT SERPL-MCNC: 0.8 MG/DL (ref 0.5–1.4)
DIFFERENTIAL METHOD BLD: ABNORMAL
EOSINOPHIL # BLD AUTO: 0.1 K/UL (ref 0–0.5)
EOSINOPHIL NFR BLD: 1.4 % (ref 0–8)
ERYTHROCYTE [DISTWIDTH] IN BLOOD BY AUTOMATED COUNT: 13.7 % (ref 11.5–14.5)
EST. GFR  (NO RACE VARIABLE): >60 ML/MIN/1.73 M^2
ESTIMATED AVG GLUCOSE: 108 MG/DL (ref 68–131)
GLUCOSE SERPL-MCNC: 79 MG/DL (ref 70–110)
HBA1C MFR BLD: 5.4 % (ref 4–5.6)
HCT VFR BLD AUTO: 42.2 % (ref 37–48.5)
HDLC SERPL-MCNC: 72 MG/DL (ref 40–75)
HDLC SERPL: 53.3 % (ref 20–50)
HGB BLD-MCNC: 13.9 G/DL (ref 12–16)
IMM GRANULOCYTES # BLD AUTO: 0.02 K/UL (ref 0–0.04)
IMM GRANULOCYTES NFR BLD AUTO: 0.3 % (ref 0–0.5)
LDLC SERPL CALC-MCNC: 54.4 MG/DL (ref 63–159)
LYMPHOCYTES # BLD AUTO: 0.8 K/UL (ref 1–4.8)
LYMPHOCYTES NFR BLD: 10.8 % (ref 18–48)
MCH RBC QN AUTO: 31.7 PG (ref 27–31)
MCHC RBC AUTO-ENTMCNC: 32.9 G/DL (ref 32–36)
MCV RBC AUTO: 96 FL (ref 82–98)
MONOCYTES # BLD AUTO: 0.6 K/UL (ref 0.3–1)
MONOCYTES NFR BLD: 8 % (ref 4–15)
NEUTROPHILS # BLD AUTO: 5.8 K/UL (ref 1.8–7.7)
NEUTROPHILS NFR BLD: 79 % (ref 38–73)
NONHDLC SERPL-MCNC: 63 MG/DL
NRBC BLD-RTO: 0 /100 WBC
PLATELET # BLD AUTO: 180 K/UL (ref 150–450)
PMV BLD AUTO: 11.1 FL (ref 9.2–12.9)
POTASSIUM SERPL-SCNC: 4.3 MMOL/L (ref 3.5–5.1)
PROT SERPL-MCNC: 7.4 G/DL (ref 6–8.4)
RBC # BLD AUTO: 4.39 M/UL (ref 4–5.4)
SODIUM SERPL-SCNC: 141 MMOL/L (ref 136–145)
TRIGL SERPL-MCNC: 43 MG/DL (ref 30–150)
TSH SERPL DL<=0.005 MIU/L-ACNC: 0.8 UIU/ML (ref 0.4–4)
WBC # BLD AUTO: 7.39 K/UL (ref 3.9–12.7)

## 2024-12-10 PROCEDURE — 85025 COMPLETE CBC W/AUTO DIFF WBC: CPT | Performed by: FAMILY MEDICINE

## 2024-12-10 PROCEDURE — 84443 ASSAY THYROID STIM HORMONE: CPT | Performed by: FAMILY MEDICINE

## 2024-12-10 PROCEDURE — 1160F RVW MEDS BY RX/DR IN RCRD: CPT | Mod: CPTII,S$GLB,, | Performed by: OPHTHALMOLOGY

## 2024-12-10 PROCEDURE — 1159F MED LIST DOCD IN RCRD: CPT | Mod: CPTII,S$GLB,, | Performed by: OPHTHALMOLOGY

## 2024-12-10 PROCEDURE — 83036 HEMOGLOBIN GLYCOSYLATED A1C: CPT | Performed by: FAMILY MEDICINE

## 2024-12-10 PROCEDURE — 82306 VITAMIN D 25 HYDROXY: CPT | Performed by: FAMILY MEDICINE

## 2024-12-10 PROCEDURE — 99999 PR PBB SHADOW E&M-EST. PATIENT-LVL III: CPT | Mod: PBBFAC,,, | Performed by: OPHTHALMOLOGY

## 2024-12-10 PROCEDURE — 3288F FALL RISK ASSESSMENT DOCD: CPT | Mod: CPTII,S$GLB,, | Performed by: OPHTHALMOLOGY

## 2024-12-10 PROCEDURE — 1101F PT FALLS ASSESS-DOCD LE1/YR: CPT | Mod: CPTII,S$GLB,, | Performed by: OPHTHALMOLOGY

## 2024-12-10 PROCEDURE — 80061 LIPID PANEL: CPT | Performed by: FAMILY MEDICINE

## 2024-12-10 PROCEDURE — 99214 OFFICE O/P EST MOD 30 MIN: CPT | Mod: S$GLB,,, | Performed by: OPHTHALMOLOGY

## 2024-12-10 PROCEDURE — 80053 COMPREHEN METABOLIC PANEL: CPT | Performed by: FAMILY MEDICINE

## 2024-12-10 PROCEDURE — 1126F AMNT PAIN NOTED NONE PRSNT: CPT | Mod: CPTII,S$GLB,, | Performed by: OPHTHALMOLOGY

## 2024-12-10 PROCEDURE — 92250 FUNDUS PHOTOGRAPHY W/I&R: CPT | Mod: S$GLB,,, | Performed by: OPHTHALMOLOGY

## 2024-12-10 PROCEDURE — 36415 COLL VENOUS BLD VENIPUNCTURE: CPT | Performed by: FAMILY MEDICINE

## 2024-12-10 PROCEDURE — 92083 EXTENDED VISUAL FIELD XM: CPT | Mod: S$GLB,,, | Performed by: OPHTHALMOLOGY

## 2024-12-10 NOTE — PROGRESS NOTES
24-2  ss done ou   Rel & Fix = good ou    Coop=     good     Patient has no allergies to latex or adhesives at this time    Jthomas    Mrx    +.75     od    + .75 x .50 x 75   os       Stereo disc pix done ou

## 2024-12-13 ENCOUNTER — OFFICE VISIT (OUTPATIENT)
Dept: FAMILY MEDICINE | Facility: CLINIC | Age: 74
End: 2024-12-13
Payer: MEDICARE

## 2024-12-13 VITALS
OXYGEN SATURATION: 98 % | DIASTOLIC BLOOD PRESSURE: 74 MMHG | WEIGHT: 167.13 LBS | TEMPERATURE: 98 F | HEART RATE: 55 BPM | HEIGHT: 66 IN | BODY MASS INDEX: 26.86 KG/M2 | SYSTOLIC BLOOD PRESSURE: 134 MMHG

## 2024-12-13 DIAGNOSIS — M85.80 OSTEOPENIA, UNSPECIFIED LOCATION: ICD-10-CM

## 2024-12-13 DIAGNOSIS — Z23 NEED FOR COVID-19 VACCINE: ICD-10-CM

## 2024-12-13 DIAGNOSIS — Z00.00 ROUTINE GENERAL MEDICAL EXAMINATION AT A HEALTH CARE FACILITY: Primary | ICD-10-CM

## 2024-12-13 DIAGNOSIS — I70.0 AORTIC ATHEROSCLEROSIS: ICD-10-CM

## 2024-12-13 DIAGNOSIS — F41.9 ANXIETY: ICD-10-CM

## 2024-12-13 PROCEDURE — 99999 PR PBB SHADOW E&M-EST. PATIENT-LVL IV: CPT | Mod: PBBFAC,,, | Performed by: FAMILY MEDICINE

## 2024-12-13 RX ORDER — ALPRAZOLAM 0.5 MG/1
0.5 TABLET ORAL DAILY PRN
Qty: 30 TABLET | Refills: 0 | Status: SHIPPED | OUTPATIENT
Start: 2024-12-13

## 2024-12-13 RX ORDER — FLUTICASONE PROPIONATE 50 MCG
2 SPRAY, SUSPENSION (ML) NASAL DAILY
Qty: 16 G | Refills: 11 | Status: SHIPPED | OUTPATIENT
Start: 2024-12-13

## 2024-12-13 NOTE — PROGRESS NOTES
(Portions of this note were dictated using voice recognition software and may contain dictation related errors in spelling/grammar/syntax not found on text review)    CC:   Chief Complaint   Patient presents with    Annual Exam       HPI: 74 y.o. female     Aortic atherosclerosis noted on prior imaging currently on Crestor 10 mg daily    Had some issues with chest discomfort for which she was seen last time but narrowed it down to a homemade chocolate drink that she was drinking with some dark chocolate figured that that could have exacerbated some of her symptoms.   Prior during workup for initial symptoms she was ordered a stress echo test but insurance company denied it.  She did end up having a cardiac CTA and calcium score is 3 which put her in the 3rd percentile.  No intervention other than her statin.    History of osteopenia but last DEXA in 2023 was normal      Anxiety, on occasional alprazolam, rarely uses.  However, has had a lot of stresses recently.  Has been had heart surgery and subsequently had stroke, has currently in the hospital, planning on rehab placement     Left knee osteoarthritis, status post TKA in August 2018.  Overall doing well but does occasionally use meloxicam.    Did have some nasal congestion over last week or so, Afrin helps, has been taking it for 2 days.    Past Medical History:   Diagnosis Date    Anxiety     Aortic atherosclerosis     Noted on imaging    Cataract     Chronic constipation     DDD (degenerative disc disease), lumbar     Fibroid tumor     Glaucoma     Hyperplastic colon polyp     Osteoarthritis of left knee     Osteopenia     Pes planus        Past Surgical History:   Procedure Laterality Date    BREAST CYST EXCISION      Breast reduction  2004    COLONOSCOPY N/A 10/10/2018    Procedure: COLONOSCOPY;  Surgeon: Cristal Whiteside MD;  Location: [facility];  Service: Endoscopy;  Laterality: N/A;    COLONOSCOPY N/A 12/05/2023    Procedure: COLONOSCOPY;  Surgeon:  Fernandez Little MD;  Location: [facility] ENDO;  Service: Endoscopy;  Laterality: N/A;    PERCUTANEOUS CRYOTHERAPY OF PERIPHERAL NERVE USING LIQUID NITROUS OXIDE IN CLOSED NEEDLE DEVICE Left 08/23/2018    Procedure: CRYOTHERAPY, NERVE, PERIPHERAL, PERCUTANEOUS, USING LIQUID NITROUS OXIDE IN CLOSED NEEDLE DEVICE;  Surgeon: FRANK Null;  Location: [facility] OR;  Service: Orthopedics;  Laterality: Left;    TOTAL KNEE ARTHROPLASTY Left     TOTAL REDUCTION MAMMOPLASTY      TUBAL LIGATION         Family History   Problem Relation Name Age of Onset    Diabetes Mother      No Known Problems Father      Diabetes Sister [place]         x2    Breast cancer Sister [place]     No Known Problems Sister Gracy     No Known Problems Sister Quita Ruby     Hypertension Brother Laurent     Seizures Brother Sean     Allergies Brother Dequan     No Known Problems Brother Ashok     No Known Problems Brother Johan     Stroke Brother Louis     Depression Daughter Cherie     Anxiety disorder Daughter Cherie     No Known Problems Daughter Jinny     Anxiety disorder Son      Breast cancer Maternal Aunt      No Known Problems Maternal Uncle      No Known Problems Paternal Aunt      No Known Problems Paternal Uncle      No Known Problems Maternal Grandmother      No Known Problems Maternal Grandfather      No Known Problems Paternal Grandmother      No Known Problems Paternal Grandfather      Colon cancer Cousin      Amblyopia Neg Hx      Blindness Neg Hx      Cancer Neg Hx      Cataracts Neg Hx      Glaucoma Neg Hx      Macular degeneration Neg Hx      Retinal detachment Neg Hx      Strabismus Neg Hx      Thyroid disease Neg Hx      Melanoma Neg Hx         Social History     Tobacco Use    Smoking status: Never    Smokeless tobacco: Never   Substance Use Topics    Alcohol use: No     Comment: yearly on occasions    Drug use: No       Lab Results   Component Value Date    WBC 7.39 12/10/2024    HGB 13.9 12/10/2024    HCT 42.2  "12/10/2024    MCV 96 12/10/2024     12/10/2024    CHOL 135 12/10/2024    TRIG 43 12/10/2024    HDL 72 12/10/2024    ALT 21 12/10/2024    AST 21 12/10/2024    BILITOT 0.8 12/10/2024    ALKPHOS 86 12/10/2024     12/10/2024    K 4.3 12/10/2024     12/10/2024    CREATININE 0.8 12/10/2024    ESTGFRAFRICA >60 12/13/2021    EGFRNONAA >60 12/13/2021    EGFRNORACEVR >60 12/10/2024    CALCIUM 9.6 12/10/2024    ALBUMIN 3.8 12/10/2024    BUN 12 12/10/2024    CO2 23 12/10/2024    TSH 0.796 12/10/2024    INR 1.0 07/30/2018    HGBA1C 5.4 12/10/2024    LDLCALC 54.4 (L) 12/10/2024    GLU 79 12/10/2024    [id number] 42 12/10/2024                 Vital signs reviewed  Vitals:    12/13/24 1034   BP: 134/74   BP Location: Left arm   Patient Position: Sitting   Pulse: (!) 55   Temp: 97.7 °F (36.5 °C)   TempSrc: Oral   SpO2: 98%   Weight: 75.8 kg (167 lb 1.7 oz)   Height: 5' 6" (1.676 m)         Wt Readings from Last 4 Encounters:   12/13/24 75.8 kg (167 lb 1.7 oz)   10/25/24 77.3 kg (170 lb 8.4 oz)   10/25/24 78 kg (171 lb 15.3 oz)   10/08/24 78.6 kg (173 lb 4.5 oz)       PE:   APPEARANCE: Well nourished, well developed, in no acute distress.    HEAD: Normocephalic, atraumatic.  EYES: PERRL. EOMI.   Conjunctivae noninjected.  EARS:  Left TM intact.  Right side obscured by cerumen  NOSE: Mucosa pink. Airway clear.  MOUTH & THROAT: No tonsillar enlargement. No pharyngeal erythema or exudate.   NECK: Supple with no cervical lymphadenopathy.  No carotid bruits.  No thyromegaly  CHEST: Good inspiratory effort. Lungs clear to auscultation with no wheezes or crackles.  CARDIOVASCULAR: Normal S1, S2. No rubs, murmurs, or gallops.  ABDOMEN: Bowel sounds normal. Not distended. Soft. No tenderness or masses. No organomegaly.  EXTREMITIES: No edema       IMPRESSION  1. Routine general medical examination at a health care facility    2. Aortic atherosclerosis    3. Osteopenia, unspecified location    4. Need for COVID-19 vaccine  "   5. Anxiety              PLAN   Continue crestor 10 mg     Reviewed labs; reassurance.     Okay for Afrin for rhinitis but do not use for more than 3-4 days.  Have given Flonase in case allergic rhinitis that she can use for up to 2-3 weeks to see if this will help with her symptoms.  Notify for any worsening issues     Refilled her Xanax for as needed use .  She manage his stress by exercise and eating healthfully.  She can notify me if anxiety significantly worsening needing further change in therapy         She occasionally takes meloxicam for occasional pains but not often, does not need a refill at this time           SCREENINGS    Immunizations:  tdap 2015  Flu: up-to-date  Pneumococcal vaccination: Sovhpam07 2018. PVX 2019  COVID  booster today  Zoster up-to-date    Age/Gender Appropriate screenings  Mmg 12/2023, repeat scheduled  dexa 08/21/2023: normal  Colonoscopy 12/05/2023: Normal

## 2024-12-17 ENCOUNTER — OFFICE VISIT (OUTPATIENT)
Dept: OBSTETRICS AND GYNECOLOGY | Facility: CLINIC | Age: 74
End: 2024-12-17
Payer: MEDICARE

## 2024-12-17 VITALS — SYSTOLIC BLOOD PRESSURE: 117 MMHG | DIASTOLIC BLOOD PRESSURE: 72 MMHG

## 2024-12-17 DIAGNOSIS — L30.4 INTERTRIGO: ICD-10-CM

## 2024-12-17 DIAGNOSIS — Z01.419 WELL WOMAN EXAM WITH ROUTINE GYNECOLOGICAL EXAM: Primary | ICD-10-CM

## 2024-12-17 PROCEDURE — 3074F SYST BP LT 130 MM HG: CPT | Mod: CPTII,S$GLB,, | Performed by: STUDENT IN AN ORGANIZED HEALTH CARE EDUCATION/TRAINING PROGRAM

## 2024-12-17 PROCEDURE — 3078F DIAST BP <80 MM HG: CPT | Mod: CPTII,S$GLB,, | Performed by: STUDENT IN AN ORGANIZED HEALTH CARE EDUCATION/TRAINING PROGRAM

## 2024-12-17 PROCEDURE — 3288F FALL RISK ASSESSMENT DOCD: CPT | Mod: CPTII,S$GLB,, | Performed by: STUDENT IN AN ORGANIZED HEALTH CARE EDUCATION/TRAINING PROGRAM

## 2024-12-17 PROCEDURE — 99999 PR PBB SHADOW E&M-EST. PATIENT-LVL II: CPT | Mod: PBBFAC,,, | Performed by: STUDENT IN AN ORGANIZED HEALTH CARE EDUCATION/TRAINING PROGRAM

## 2024-12-17 PROCEDURE — 3044F HG A1C LEVEL LT 7.0%: CPT | Mod: CPTII,S$GLB,, | Performed by: STUDENT IN AN ORGANIZED HEALTH CARE EDUCATION/TRAINING PROGRAM

## 2024-12-17 PROCEDURE — 1159F MED LIST DOCD IN RCRD: CPT | Mod: CPTII,S$GLB,, | Performed by: STUDENT IN AN ORGANIZED HEALTH CARE EDUCATION/TRAINING PROGRAM

## 2024-12-17 PROCEDURE — 1101F PT FALLS ASSESS-DOCD LE1/YR: CPT | Mod: CPTII,S$GLB,, | Performed by: STUDENT IN AN ORGANIZED HEALTH CARE EDUCATION/TRAINING PROGRAM

## 2024-12-17 PROCEDURE — G0101 CA SCREEN;PELVIC/BREAST EXAM: HCPCS | Mod: S$GLB,,, | Performed by: STUDENT IN AN ORGANIZED HEALTH CARE EDUCATION/TRAINING PROGRAM

## 2024-12-17 PROCEDURE — 1126F AMNT PAIN NOTED NONE PRSNT: CPT | Mod: CPTII,S$GLB,, | Performed by: STUDENT IN AN ORGANIZED HEALTH CARE EDUCATION/TRAINING PROGRAM

## 2024-12-17 RX ORDER — CLOTRIMAZOLE AND BETAMETHASONE DIPROPIONATE 10; .64 MG/G; MG/G
CREAM TOPICAL 2 TIMES DAILY PRN
Qty: 45 G | Refills: 4 | Status: SHIPPED | OUTPATIENT
Start: 2024-12-17

## 2024-12-17 NOTE — PROGRESS NOTES
CC: Well woman exam    HPI:  Huber Pena is a 74 y.o. female  presents for a well woman exam.  She has no major issues, problems, or complaints. Right sided groin still gets intermittently itchy, Lotrisone cream works well       Patient history:   Past Medical History:   Diagnosis Date    Anxiety     Aortic atherosclerosis     Noted on imaging    Cataract     Chronic constipation     DDD (degenerative disc disease), lumbar     Fibroid tumor     Glaucoma     Hyperplastic colon polyp     Osteoarthritis of left knee     Osteopenia     Pes planus      Past Surgical History:   Procedure Laterality Date    BREAST CYST EXCISION      Breast reduction      COLONOSCOPY N/A 10/10/2018    Procedure: COLONOSCOPY;  Surgeon: Cristal Whiteside MD;  Location: [facility] ENDO;  Service: Endoscopy;  Laterality: N/A;    COLONOSCOPY N/A 2023    Procedure: COLONOSCOPY;  Surgeon: Fernandez Little MD;  Location: [facility] ENDO;  Service: Endoscopy;  Laterality: N/A;    PERCUTANEOUS CRYOTHERAPY OF PERIPHERAL NERVE USING LIQUID NITROUS OXIDE IN CLOSED NEEDLE DEVICE Left 2018    Procedure: CRYOTHERAPY, NERVE, PERIPHERAL, PERCUTANEOUS, USING LIQUID NITROUS OXIDE IN CLOSED NEEDLE DEVICE;  Surgeon: FRANK Null;  Location: [facility] OR;  Service: Orthopedics;  Laterality: Left;    TOTAL KNEE ARTHROPLASTY Left     TOTAL REDUCTION MAMMOPLASTY      TUBAL LIGATION       OB History    Para Term  AB Living   4 3 3   1 3   SAB IAB Ectopic Multiple Live Births           3      # Outcome Date GA Lbr Dejuan/2nd Weight Sex Type Anes PTL Lv   4 Term 86 40w0d   M Vag-Spont EPI  HUSSEIN   3 Term 78 40w0d   F Vag-Spont EPI  HUSSEIN   2 Term 73 40w0d   F Vag-Spont None  HUSSEIN   1 AB                GYN  Menopausal: Yes  History of abnormal paps: DENIES  Abnormal or postmenopausal bleeding: DENIES  History of abnormal mammograms:DENIES   Family history of breast or ovarian cancer: DENIES  Any breast masses, pain,  skin changes, or nipple discharge: DENIES  Possible recent STD exposure: denies  Contraception: N/A    Pap: 12/27/2016, >65 and no history of high grade dysplasia  Mammogram: BIRADS1, T-C=6.3% 2023      Family History   Problem Relation Name Age of Onset    Diabetes Mother      No Known Problems Father      Diabetes Sister [place]         x2    Breast cancer Sister [place]     No Known Problems Sister Gracy     No Known Problems Sister Quita Ruby     Hypertension Brother Mehran     Seizures Brother Sean     Allergies Brother Dequan     No Known Problems Brother Ashok     No Known Problems Brother Johan     Stroke Brother Louis     Depression Daughter Cherie     Anxiety disorder Daughter Cherie     No Known Problems Daughter Jinny     Anxiety disorder Son      Breast cancer Maternal Aunt      No Known Problems Maternal Uncle      No Known Problems Paternal Aunt      No Known Problems Paternal Uncle      No Known Problems Maternal Grandmother      No Known Problems Maternal Grandfather      No Known Problems Paternal Grandmother      No Known Problems Paternal Grandfather      Colon cancer Cousin      Amblyopia Neg Hx      Blindness Neg Hx      Cancer Neg Hx      Cataracts Neg Hx      Glaucoma Neg Hx      Macular degeneration Neg Hx      Retinal detachment Neg Hx      Strabismus Neg Hx      Thyroid disease Neg Hx      Melanoma Neg Hx       Social History     Tobacco Use    Smoking status: Never    Smokeless tobacco: Never   Substance Use Topics    Alcohol use: No     Comment: yearly on occasions    Drug use: No     Allergies: Benadryl [diphenhydramine hcl]    Current Outpatient Medications:     ALPRAZolam (XANAX) 0.5 MG tablet, Take 1 tablet (0.5 mg total) by mouth daily as needed., Disp: 30 tablet, Rfl: 0    clindamycin (CLEOCIN) 150 MG capsule, Take 300 mg by mouth as needed. Prior to dental work, Disp: , Rfl:     dorzolamide-timolol 2-0.5% (COSOPT) 22.3-6.8 mg/mL ophthalmic solution, Place 1 drop into both  eyes 2 (two) times daily., Disp: 30 mL, Rfl: 3    fluticasone propionate (FLONASE) 50 mcg/actuation nasal spray, 2 sprays (100 mcg total) by Each Nostril route once daily., Disp: 16 g, Rfl: 11    ketotifen (ZADITOR) 0.025 % (0.035 %) ophthalmic solution, PLACE 2 DROPS INTO BOTH EYES 2 (TWO) TIMES DAILY., Disp: 15 mL, Rfl: 1    meloxicam (MOBIC) 7.5 MG tablet, TAKE 1 TABLET BY MOUTH EVERY DAY AS NEEDED, Disp: 30 tablet, Rfl: 2    nystatin (MYCOSTATIN) powder, Apply topically as needed., Disp: 60 g, Rfl: 1    rosuvastatin (CRESTOR) 10 MG tablet, Take 1 tablet (10 mg total) by mouth once daily., Disp: 90 tablet, Rfl: 1    clotrimazole-betamethasone 1-0.05% (LOTRISONE) cream, Apply topically 2 (two) times daily as needed (vulvar itching)., Disp: 45 g, Rfl: 4       ROS:  GENERAL: Denies weight gain or weight loss. Feeling well overall.   SKIN: Denies rash or lesions.   HEAD: Denies head injury or headache.   NODES: Denies enlarged lymph nodes.   CHEST: Denies chest pain or shortness of breath.   CARDIOVASCULAR: Denies palpitations or left sided chest pain.   ABDOMEN: No abdominal pain, constipation, diarrhea, nausea, vomiting or rectal bleeding.   URINARY: No frequency, dysuria, hematuria, or burning on urination.  REPRODUCTIVE: See HPI.   BREASTS: The patient performs breast self-examination and denies pain, lumps, or nipple discharge.   HEMATOLOGIC: No easy bruisability or excessive bleeding.  MUSCULOSKELETAL: Denies joint pain or swelling.   NEUROLOGIC: Denies syncope or weakness.   PSYCHIATRIC: Denies depression, anxiety or mood swings.    Objective:   /72 (Patient Position: Sitting)   LMP  (LMP Unknown)       Physical Exam:  APPEARANCE: Well nourished, well developed, in no acute distress.  AFFECT: WNL, alert and oriented x 3  SKIN: No acne or hirsutism  NECK: Neck symmetric without masses or thyromegaly  CHEST: Good respiratory effect  ABDOMEN: Soft.  No tenderness or masses.  No hepatosplenomegaly.  No  hernias.  BREASTS: Symmetrical, no skin changes or visible lesions.  No palpable masses, nipple discharge bilaterally.  PELVIC: Normal external genitalia without lesions.  Normal hair distribution.  Adequate perineal body, normal urethral meatus.  Vagina atrophic without lesions or discharge.  Cervix pink, without lesions, discharge or tenderness.  No significant cystocele or rectocele.    EXTREMITIES: No edema.    ASSESSMENT AND PLAN  1. Well woman exam with routine gynecological exam        2. Intertrigo  clotrimazole-betamethasone 1-0.05% (LOTRISONE) cream            Annual exam  Breast and pelvic exam: wnl  Patient counseled on ASCCP guidelines for cervical cytology screening  Cervical screening: not indicated, >66yo with no history of high grade dysplasia  Patient counseled on current recommendations for breast cancer screening  Mammogram screening: scheduled on thursday  STD testing: not requested today   Osteoporosis screening completed 8/2023, normal   Tobacco cessation counseling n/a  Continue PRN Lotrisone as needed     She was counseled to follow up with her PCP for other routine health maintenance      Follow up in about 2 years (around 12/17/2026).      Claudia Solares MD  OBGYN [facility]

## 2024-12-19 ENCOUNTER — HOSPITAL ENCOUNTER (OUTPATIENT)
Dept: RADIOLOGY | Facility: HOSPITAL | Age: 74
Discharge: HOME | End: 2024-12-19
Attending: FAMILY MEDICINE
Payer: MEDICARE

## 2024-12-19 DIAGNOSIS — Z12.31 BREAST CANCER SCREENING BY MAMMOGRAM: ICD-10-CM

## 2024-12-19 PROCEDURE — 77067 SCR MAMMO BI INCL CAD: CPT | Mod: TC

## 2025-03-19 ENCOUNTER — TELEPHONE (OUTPATIENT)
Dept: FAMILY MEDICINE | Facility: CLINIC | Age: 75
End: 2025-03-19
Payer: MEDICARE

## 2025-03-19 NOTE — TELEPHONE ENCOUNTER
Yes she does  need a Tdap (tetanus diphtheria whooping cough) shot since it has been 10 years.  With Medicare she needs to get this done at the pharmacy.  She can go to any pharmacy and ask for a Tdap vaccine    Reviewed her last labs from December of 2024.  Her blood counts (red blood cell count, white blood cell count, platelets) were all normal.  There were some flagged percentages on the CBC blood count test but those are not clinically relevant in her case.  Her kidney test liver tests electrolytes were all normal.  Thyroid test, diabetes screening test all normal.  Vitamin-D was in normal range.  Her cholesterol was all in normal range.  Her LDL bad cholesterol flagged as low although low is good

## 2025-03-19 NOTE — TELEPHONE ENCOUNTER
Spoke to pt and [place] is going [place] the country and she wants know she needs a whooping cough  shot and also the tetanus (she had it done in 2005 and 2015)          Pt also want to know about her test results at the beginning of the year and  she would like a call from the Doctor to go over her results because some of them were a bit high and he wants some clarification on it   Please advise

## 2025-03-19 NOTE — TELEPHONE ENCOUNTER
Pt inform that she must get TDAP and whopping cough shot and with it beeing over 10 years and with Medicare she has to get those shots from RX. Pt also inform of lab work form 12/24 and drs rec.Pt agrees to all information verbalize and understands.

## 2025-03-19 NOTE — TELEPHONE ENCOUNTER
----- Message from [employer] sent at 3/19/2025  8:52 AM CDT -----  Type: General Call Back Name of Caller:ptSymptoms:vaccinesWould the patient rather a call back or a response via [employer]? Call Sly Call Back Number:[phone] Additional Information: Pt is going out of the country soon and would like to know if she needs to take any of her vaccines and is curious about the whipping cough.

## 2025-03-28 DIAGNOSIS — H40.023 OPEN ANGLE WITH BORDERLINE FINDINGS AND HIGH GLAUCOMA RISK IN BOTH EYES: ICD-10-CM

## 2025-03-28 RX ORDER — DORZOLAMIDE HYDROCHLORIDE AND TIMOLOL MALEATE 20; 5 MG/ML; MG/ML
1 SOLUTION/ DROPS OPHTHALMIC 2 TIMES DAILY
Qty: 30 ML | Refills: 3 | Status: SHIPPED | OUTPATIENT
Start: 2025-03-28

## 2025-03-31 ENCOUNTER — OFFICE VISIT (OUTPATIENT)
Dept: DERMATOLOGY | Facility: CLINIC | Age: 75
End: 2025-03-31
Payer: MEDICARE

## 2025-03-31 ENCOUNTER — TELEPHONE (OUTPATIENT)
Dept: DERMATOLOGY | Facility: CLINIC | Age: 75
End: 2025-03-31
Payer: MEDICARE

## 2025-03-31 DIAGNOSIS — L80 VITILIGO: Primary | ICD-10-CM

## 2025-03-31 PROCEDURE — 1160F RVW MEDS BY RX/DR IN RCRD: CPT | Mod: CPTII,S$GLB,, | Performed by: DERMATOLOGY

## 2025-03-31 PROCEDURE — 3288F FALL RISK ASSESSMENT DOCD: CPT | Mod: CPTII,S$GLB,, | Performed by: DERMATOLOGY

## 2025-03-31 PROCEDURE — 1125F AMNT PAIN NOTED PAIN PRSNT: CPT | Mod: CPTII,S$GLB,, | Performed by: DERMATOLOGY

## 2025-03-31 PROCEDURE — 1159F MED LIST DOCD IN RCRD: CPT | Mod: CPTII,S$GLB,, | Performed by: DERMATOLOGY

## 2025-03-31 PROCEDURE — 1101F PT FALLS ASSESS-DOCD LE1/YR: CPT | Mod: CPTII,S$GLB,, | Performed by: DERMATOLOGY

## 2025-03-31 PROCEDURE — 99999 PR PBB SHADOW E&M-EST. PATIENT-LVL III: CPT | Mod: PBBFAC,,, | Performed by: DERMATOLOGY

## 2025-03-31 PROCEDURE — 99214 OFFICE O/P EST MOD 30 MIN: CPT | Mod: S$GLB,,, | Performed by: DERMATOLOGY

## 2025-03-31 RX ORDER — PIMECROLIMUS 10 MG/G
CREAM TOPICAL
Qty: 30 G | Refills: 3 | Status: SHIPPED | OUTPATIENT
Start: 2025-03-31

## 2025-03-31 RX ORDER — BETAMETHASONE DIPROPIONATE 0.5 MG/G
CREAM TOPICAL
Qty: 45 G | Refills: 1 | Status: SHIPPED | OUTPATIENT
Start: 2025-03-31

## 2025-03-31 NOTE — PROGRESS NOTES
Subjective:      Patient ID:  Huber Pena is a 74 y.o. female who presents for   Chief Complaint   Patient presents with    Itching     Neck     Patient here for itching.    Last seen by dermatologist: by Dr. Holman in 2023  H/o of vitiligo - 50 years ago    Itching - Initial  Affected locations: neck  Duration: 3 months  Signs / symptoms: asymptomatic and spreading  Timing: constant  Aggravated by: nothing  Relieving factors/Treatments tried: nothing        Review of Systems   Skin:  Positive for itching (very mild). Negative for rash.   Hematologic/Lymphatic: Does not bruise/bleed easily.   Allergic/Immunologic: Negative for environmental allergies.       Objective:   Physical Exam   Constitutional: She appears well-developed and well-nourished. No distress.   Neurological: She is alert and oriented to person, place, and time. She is not disoriented.   Psychiatric: She has a normal mood and affect.   Skin:   Areas Examined (abnormalities noted in diagram):   Neck Inspection Performed  RLE Inspected  LLE Inspection Performed                 Diagram Legend     Erythematous scaling macule/papule c/w actinic keratosis       Vascular papule c/w angioma      Pigmented verrucoid papule/plaque c/w seborrheic keratosis      Yellow umbilicated papule c/w sebaceous hyperplasia      Irregularly shaped tan macule c/w lentigo     1-2 mm smooth white papules consistent with Milia      Movable subcutaneous cyst with punctum c/w epidermal inclusion cyst      Subcutaneous movable cyst c/w pilar cyst      Firm pink to brown papule c/w dermatofibroma      Pedunculated fleshy papule(s) c/w skin tag(s)      Evenly pigmented macule c/w junctional nevus     Mildly variegated pigmented, slightly irregular-bordered macule c/w mildly atypical nevus      Flesh colored to evenly pigmented papule c/w intradermal nevus       Pink pearly papule/plaque c/w basal cell carcinoma      Erythematous hyperkeratotic cursted plaque c/w SCC       Surgical scar with no sign of skin cancer recurrence      Open and closed comedones      Inflammatory papules and pustules      Verrucoid papule consistent consistent with wart     Erythematous eczematous patches and plaques     Dystrophic onycholytic nail with subungual debris c/w onychomycosis     Umbilicated papule    Erythematous-base heme-crusted tan verrucoid plaque consistent with inflamed seborrheic keratosis     Erythematous Silvery Scaling Plaque c/w Psoriasis     See annotation      Assessment / Plan:        Vitiligo - left ant neck  -     betamethasone dipropionate 0.05 % cream; AAA ant neck bid x 3 months  Dispense: 45 g; Refill: 1  -     pimecrolimus (ELIDEL) 1 % cream; AAA ant neck bid  Dispense: 30 g; Refill: 3  Treat with beta dip bid x 3 months then start elidel cream bid            No follow-ups on file.

## 2025-04-22 ENCOUNTER — OFFICE VISIT (OUTPATIENT)
Dept: OPHTHALMOLOGY | Facility: CLINIC | Age: 75
End: 2025-04-22
Payer: MEDICARE

## 2025-04-22 DIAGNOSIS — H52.03 HYPEROPIA WITH PRESBYOPIA OF BOTH EYES: ICD-10-CM

## 2025-04-22 DIAGNOSIS — H52.4 HYPEROPIA WITH PRESBYOPIA OF BOTH EYES: ICD-10-CM

## 2025-04-22 DIAGNOSIS — H25.13 NUCLEAR SCLEROTIC CATARACT OF BOTH EYES: ICD-10-CM

## 2025-04-22 DIAGNOSIS — H40.023 OPEN ANGLE WITH BORDERLINE FINDINGS AND HIGH GLAUCOMA RISK IN BOTH EYES: Primary | ICD-10-CM

## 2025-04-22 PROCEDURE — 3288F FALL RISK ASSESSMENT DOCD: CPT | Mod: CPTII,S$GLB,, | Performed by: OPHTHALMOLOGY

## 2025-04-22 PROCEDURE — 99214 OFFICE O/P EST MOD 30 MIN: CPT | Mod: S$GLB,,, | Performed by: OPHTHALMOLOGY

## 2025-04-22 PROCEDURE — 1159F MED LIST DOCD IN RCRD: CPT | Mod: CPTII,S$GLB,, | Performed by: OPHTHALMOLOGY

## 2025-04-22 PROCEDURE — 99999 PR PBB SHADOW E&M-EST. PATIENT-LVL III: CPT | Mod: PBBFAC,,, | Performed by: OPHTHALMOLOGY

## 2025-04-22 PROCEDURE — 1126F AMNT PAIN NOTED NONE PRSNT: CPT | Mod: CPTII,S$GLB,, | Performed by: OPHTHALMOLOGY

## 2025-04-22 PROCEDURE — 1101F PT FALLS ASSESS-DOCD LE1/YR: CPT | Mod: CPTII,S$GLB,, | Performed by: OPHTHALMOLOGY

## 2025-04-22 PROCEDURE — 1160F RVW MEDS BY RX/DR IN RCRD: CPT | Mod: CPTII,S$GLB,, | Performed by: OPHTHALMOLOGY

## 2025-04-22 NOTE — PROGRESS NOTES
HPI     Glaucoma            Comments: 4 month IOP ck           Eye Problem            Comments: Pt c/o difficulty driving in bright sunlight and more   difficulty driving at night          Comments    DLS: 12/10/24    1. OAG Suspect OU  2. NS OU  3. Hyperopia and Presbyopia    MEDS:  Cosopt BID OU = PT SOMETIMES MISSES 2ND DOSE  Zaditor PRN OU          Last edited by Katy Carr MA on 4/22/2025  1:17 PM.            Assessment /Plan     For exam results, see Encounter Report.    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 soft CI)        LASERS    none        GLAUCOMA SURGERIES    none        OTHER EYE SURGERIES    none        CDR    07/0.85        Tbase    >20 (24)  (per pt)         Tmax    > 20  (21)   (per pt)         Ttarget    undetermined          HVF    4 test 2022 to 2024 - not reliable, paracentral/central defects od // inferior and ST defects os        Gonio    3+ w steep approach OU        CCT    583/579        OCT    3  test 2022 to 204 - RNFL - bord N od //  dec G/T , bord N os (? Mild prog ou)         Disc photos    12/2024     - Ttoday  21/22 ( IOP up from 17/17- has a sinus infection and feeling a lot of pressure)   - Test done today   -IOP//    2. Cataracts   - may be VS  - C/O GLARE WITH SUN LIGHT   - BCVA 20/30 ou // BATh 20/50 ou (8/13/2024)   - BCVA 2015 w/ Lakisha was 20/25 and 20/30   - monitor for now       3. Hyperopia / presbyopia     Try and get records from Dr. Longo    Cont cosopt ou bid     8/13/2024   - doing well,  IOP well controlled on cosopt at 17 - 20  OU   - discussed early cataracts, nonVS, discussed what astigmatism is, and how needs correction w MRX OS- poor understanding but inquisitive. happy right now   - gonio + 3 ou   -RNFL OCT     4/22/2025   IOP 21/22 - borderline   If needs lower IOP can re-try latanoprost or consider slt     HER  had a stroke and can no longer drive - she now has to do all the driving - has trouble at night - mild cataracts - NVS - monitor     F/U 4 months with IOP  and HVF / / OCT // do NOT dilate - check gonio   - if IOP still up consider SLT ( if open enough)  vs additional drops- would be willing to try latanoprost again -  (she thinks her  has a laser for eye pressure)

## 2025-04-29 ENCOUNTER — PATIENT OUTREACH (OUTPATIENT)
Facility: OTHER | Age: 75
End: 2025-04-29
Payer: MEDICARE

## 2025-04-29 ENCOUNTER — NURSE TRIAGE (OUTPATIENT)
Dept: ADMINISTRATIVE | Facility: CLINIC | Age: 75
End: 2025-04-29
Payer: MEDICARE

## 2025-04-29 ENCOUNTER — OFFICE VISIT (OUTPATIENT)
Dept: FAMILY MEDICINE | Facility: CLINIC | Age: 75
End: 2025-04-29
Attending: FAMILY MEDICINE
Payer: MEDICARE

## 2025-04-29 ENCOUNTER — HOSPITAL ENCOUNTER (OUTPATIENT)
Dept: RADIOLOGY | Facility: HOSPITAL | Age: 75
Discharge: HOME | End: 2025-04-29
Attending: FAMILY MEDICINE
Payer: MEDICARE

## 2025-04-29 ENCOUNTER — OCHSNER VIRTUAL EMERGENCY DEPARTMENT (OUTPATIENT)
Facility: CLINIC | Age: 75
End: 2025-04-29
Payer: MEDICARE

## 2025-04-29 VITALS — HEART RATE: 70 BPM | DIASTOLIC BLOOD PRESSURE: 84 MMHG | SYSTOLIC BLOOD PRESSURE: 136 MMHG | OXYGEN SATURATION: 98 %

## 2025-04-29 DIAGNOSIS — M54.50 ACUTE RIGHT-SIDED LOW BACK PAIN WITHOUT SCIATICA: ICD-10-CM

## 2025-04-29 DIAGNOSIS — M54.50 ACUTE RIGHT-SIDED LOW BACK PAIN WITHOUT SCIATICA: Primary | ICD-10-CM

## 2025-04-29 PROCEDURE — 3288F FALL RISK ASSESSMENT DOCD: CPT | Mod: CPTII,S$GLB,, | Performed by: FAMILY MEDICINE

## 2025-04-29 PROCEDURE — 1101F PT FALLS ASSESS-DOCD LE1/YR: CPT | Mod: CPTII,S$GLB,, | Performed by: FAMILY MEDICINE

## 2025-04-29 PROCEDURE — 72110 X-RAY EXAM L-2 SPINE 4/>VWS: CPT | Mod: TC,FY

## 2025-04-29 PROCEDURE — 1160F RVW MEDS BY RX/DR IN RCRD: CPT | Mod: CPTII,S$GLB,, | Performed by: FAMILY MEDICINE

## 2025-04-29 PROCEDURE — 99214 OFFICE O/P EST MOD 30 MIN: CPT | Mod: S$GLB,,, | Performed by: FAMILY MEDICINE

## 2025-04-29 PROCEDURE — 3075F SYST BP GE 130 - 139MM HG: CPT | Mod: CPTII,S$GLB,, | Performed by: FAMILY MEDICINE

## 2025-04-29 PROCEDURE — 3079F DIAST BP 80-89 MM HG: CPT | Mod: CPTII,S$GLB,, | Performed by: FAMILY MEDICINE

## 2025-04-29 PROCEDURE — 72110 X-RAY EXAM L-2 SPINE 4/>VWS: CPT | Mod: 26,,, | Performed by: RADIOLOGY

## 2025-04-29 PROCEDURE — 1159F MED LIST DOCD IN RCRD: CPT | Mod: CPTII,S$GLB,, | Performed by: FAMILY MEDICINE

## 2025-04-29 PROCEDURE — 99999 PR PBB SHADOW E&M-EST. PATIENT-LVL IV: CPT | Mod: PBBFAC,,, | Performed by: FAMILY MEDICINE

## 2025-04-29 RX ORDER — METHOCARBAMOL 500 MG/1
500 TABLET, FILM COATED ORAL 3 TIMES DAILY
Qty: 30 TABLET | Refills: 0 | Status: SHIPPED | OUTPATIENT
Start: 2025-04-29 | End: 2025-05-09

## 2025-04-29 NOTE — PROGRESS NOTES
Subjective     Patient ID: Huber Pena is a 74 y.o. female.    Chief Complaint: Low-back Pain (Right worse than left and on RIGHT pain into buttock and down leg )    74 yr old pleasant black female with multiple co morbidities presents today for evaluation of low back pain on right. Onset 1-2 weeks ago and worsening. No trauma or fall but she worked in garden, also lifted some 5 gallons and also she walks 5 miles daily. No saddle anesthesia, bladder or bowel issues. It is aching type, 6/10 and worse with certain movements. Details as follows -    Low-back Pain  This is a new problem. The current episode started 1 to 4 weeks ago. The problem occurs intermittently. The problem has been unchanged. Associated symptoms include arthralgias and myalgias. Pertinent negatives include no anorexia, chest pain, congestion, diaphoresis, fatigue, headaches, joint swelling, nausea, sore throat, vertigo or weakness. The symptoms are aggravated by bending. She has tried nothing for the symptoms. The treatment provided no relief.     Review of Systems   Constitutional: Negative.  Negative for activity change, diaphoresis, fatigue and unexpected weight change.   HENT: Negative.  Negative for nasal congestion, ear discharge, hearing loss, rhinorrhea, sore throat and voice change.    Eyes: Negative.  Negative for pain, discharge and visual disturbance.   Respiratory: Negative.  Negative for chest tightness, shortness of breath and wheezing.    Cardiovascular: Negative.  Negative for chest pain.   Gastrointestinal: Negative.  Negative for abdominal distention, anal bleeding, anorexia, constipation and nausea.   Endocrine: Negative.  Negative for cold intolerance, polydipsia and polyuria.   Genitourinary: Negative.  Negative for decreased urine volume, difficulty urinating, dysuria, frequency, menstrual problem and vaginal pain.   Musculoskeletal:  Positive for arthralgias and myalgias. Negative for gait problem and joint swelling.    Integumentary:  Negative for color change, pallor and wound. Negative.   Allergic/Immunologic: Negative.  Negative for environmental allergies and immunocompromised state.   Neurological: Negative.  Negative for dizziness, vertigo, tremors, seizures, speech difficulty, weakness and headaches.   Hematological: Negative.  Negative for adenopathy. Does not bruise/bleed easily.   Psychiatric/Behavioral: Negative.  Negative for agitation, confusion, decreased concentration, hallucinations, self-injury and suicidal ideas. The patient is not nervous/anxious.           Objective     Physical Exam  Constitutional:       General: She is not in acute distress.     Appearance: She is well-developed. She is not diaphoretic.   HENT:      Head: Normocephalic and atraumatic.      Right Ear: External ear normal.      Left Ear: External ear normal.      Nose: Nose normal.      Mouth/Throat:      Pharynx: No oropharyngeal exudate.   Eyes:      General: No scleral icterus.        Right eye: No discharge.         Left eye: No discharge.      Conjunctiva/sclera: Conjunctivae normal.      Pupils: Pupils are equal, round, and reactive to light.   Neck:      Thyroid: No thyromegaly.      Vascular: No JVD.      Trachea: No tracheal deviation.   Cardiovascular:      Rate and Rhythm: Normal rate and regular rhythm.      Heart sounds: Normal heart sounds. No murmur heard.     No friction rub. No gallop.   Pulmonary:      Effort: Pulmonary effort is normal.      Breath sounds: Normal breath sounds. No stridor. No wheezing or rales.   Chest:      Chest wall: No tenderness.   Abdominal:      General: Bowel sounds are normal. There is no distension.      Palpations: Abdomen is soft. There is no mass.      Tenderness: There is no abdominal tenderness. There is no guarding or rebound.      Hernia: No hernia is present.   Musculoskeletal:         General: Tenderness (ttp right paralumbar and L2-L4 and gluteal and hip area. SLRT negative.) present.  Normal range of motion.      Cervical back: Normal range of motion and neck supple.   Lymphadenopathy:      Cervical: No cervical adenopathy.   Skin:     General: Skin is warm and dry.      Coloration: Skin is not pale.      Findings: No erythema or rash.   Neurological:      Mental Status: She is alert and oriented to person, place, and time.      Cranial Nerves: No cranial nerve deficit.      Motor: No abnormal muscle tone.      Coordination: Coordination normal.      Deep Tendon Reflexes: Reflexes are normal and symmetric. Reflexes normal.   Psychiatric:         Behavior: Behavior normal.         Thought Content: Thought content normal.         Judgment: Judgment normal.            Assessment and Plan     1. Acute right-sided low back pain without sciatica  -     X-Ray Lumbar Spine 5 View; Future; Expected date: 04/29/2025  -     methocarbamoL (ROBAXIN) 500 MG Tab; Take 1 tablet (500 mg total) by mouth 3 (three) times daily. for 10 days  Dispense: 30 tablet; Refill: 0        Acute LBP  -DD myalgia, arthalgia. DJD  -xrays  -rest, heat/icing  -muscle relaxant prn  -PT/ortho/ER/MRI options given    Spent adequate time in obtaining history and explaining differentials    30 minutes spent during this visit of which greater than 50% devoted to face-face counseling and coordination of care regarding diagnosis and management plan           Follow up if symptoms worsen or fail to improve.

## 2025-04-29 NOTE — TELEPHONE ENCOUNTER
Leri c/o lower back pain and right inner thigh and hip pain x 5 days. Denies known recent injury. Unsure if related to new tennis shoes, mopping and reaching under the bed and/or picking up 5lb bucket of water. Per triage protocol, go to ED/UC now (or to office with PCP approval). No available appts. Advised Leri per Dr. Phuong Shi, [facility] OCP - recommends PCP appt. Appt scheduled by Kiran for 3:20 p.m. today at [place] with Brody Corcoran MD. Home care and call back instructions provided per triage protocol for the mean time. Leri v/u.     Reason for Disposition   Pain radiates into groin, scrotum    Additional Information   Negative: Passed out (e.g., fainted, lost consciousness, blacked out and was not responding)   Negative: Shock suspected (e.g., cold/pale/clammy skin, too weak to stand, low BP, rapid pulse)   Negative: Sounds like a life-threatening emergency to the triager   Negative: SEVERE back pain of sudden onset and age > 60 years   Negative: SEVERE abdominal pain (e.g., excruciating)   Negative: Abdominal pain and age > 60 years   Negative: Unable to urinate (or only a few drops) and bladder feels very full   Negative: Loss of bladder or bowel control (urine or bowel incontinence; wetting self, leaking stool) of new-onset   Negative: Numbness (loss of sensation) in groin or rectal area    Protocols used: Back Pain-A-[place]

## 2025-04-29 NOTE — PROGRESS NOTES
"Patient called the OOC RN on 4/29/25 for c/o "ower back pain and right inner thigh and hip pain x 5 days. Denies known recent injury. Unsure if related to new tennis shoes, mopping and reaching under the bed and/or picking up 5lb bucket of water". OOC RN consult Kiran.     [facility] Provider Dr Phuong Shi disposition recommendation patient to see primary care.    Appointment scheduled for 4/29/2025 at 3:20 PM with Brody [facility]an [facility] [address] [facility] [address], [place] [address] [place] [id number].    Follow up scheduled for 4/30/25 to assess for additional needs.      "

## 2025-04-29 NOTE — PLAN OF CARE-OVED
[facility] Emergency Department Plan of Care Note  Referral Source: Nurse On-Call                               Chief Complaint   Patient presents with    Back Pain     c/o lower back pain and right inner thigh and hip pain x 5 days. Denies known recent injury. Unsure if related to new tennis shoes, mopping and reaching under the bed and/or picking up 5lb bucket of water.       Recommendation: Primary Care                       Recommendation comment: scheduled with Brody Corcoran MD today at 3:20    Encounter Diagnosis   Name Primary?    Acute right-sided low back pain without sciatica Yes

## 2025-05-02 ENCOUNTER — PATIENT OUTREACH (OUTPATIENT)
Facility: OTHER | Age: 75
End: 2025-05-02
Payer: MEDICARE

## 2025-05-02 NOTE — PROGRESS NOTES
Patient completed a primary care appointment on 4/29/25. A follow-up call was made to assess for additional needs. There was no answer, and a message was left requesting a return call. Assistance will be provided as needed if the patient returns the call.

## 2025-05-05 ENCOUNTER — PATIENT MESSAGE (OUTPATIENT)
Dept: FAMILY MEDICINE | Facility: CLINIC | Age: 75
End: 2025-05-05
Payer: MEDICARE

## 2025-05-20 ENCOUNTER — OFFICE VISIT (OUTPATIENT)
Dept: FAMILY MEDICINE | Facility: CLINIC | Age: 75
End: 2025-05-20
Payer: MEDICARE

## 2025-05-20 VITALS
BODY MASS INDEX: 26.97 KG/M2 | HEIGHT: 66 IN | SYSTOLIC BLOOD PRESSURE: 134 MMHG | HEART RATE: 99 BPM | DIASTOLIC BLOOD PRESSURE: 74 MMHG

## 2025-05-20 DIAGNOSIS — L73.9 FOLLICULITIS: Primary | ICD-10-CM

## 2025-05-20 DIAGNOSIS — L98.9 SKIN LESION: ICD-10-CM

## 2025-05-20 PROCEDURE — 99999 PR PBB SHADOW E&M-EST. PATIENT-LVL IV: CPT | Mod: PBBFAC,,,

## 2025-05-20 PROCEDURE — 99215 OFFICE O/P EST HI 40 MIN: CPT | Mod: S$GLB,,,

## 2025-05-20 PROCEDURE — 1126F AMNT PAIN NOTED NONE PRSNT: CPT | Mod: CPTII,S$GLB,,

## 2025-05-20 PROCEDURE — 3078F DIAST BP <80 MM HG: CPT | Mod: CPTII,S$GLB,,

## 2025-05-20 PROCEDURE — 3075F SYST BP GE 130 - 139MM HG: CPT | Mod: CPTII,S$GLB,,

## 2025-05-20 PROCEDURE — 1159F MED LIST DOCD IN RCRD: CPT | Mod: CPTII,S$GLB,,

## 2025-05-20 PROCEDURE — 1160F RVW MEDS BY RX/DR IN RCRD: CPT | Mod: CPTII,S$GLB,,

## 2025-05-20 RX ORDER — TRIAMCINOLONE ACETONIDE 1 MG/G
OINTMENT TOPICAL 2 TIMES DAILY
Qty: 15 G | Refills: 0 | Status: SHIPPED | OUTPATIENT
Start: 2025-05-20 | End: 2025-05-27

## 2025-05-20 RX ORDER — DOXYCYCLINE 100 MG/1
100 CAPSULE ORAL EVERY 12 HOURS
Qty: 10 CAPSULE | Refills: 0 | Status: SHIPPED | OUTPATIENT
Start: 2025-05-20 | End: 2025-05-25

## 2025-05-20 NOTE — PROGRESS NOTES
Subjective     Patient ID: Huber Pena is a 74 y.o. female.    Chief Complaint: Rash (Painful mole on left breast/?)      Patient is a 74 year old black female with anxiety, HLD that presents to clinic alone today as a new patient to me, established with Dr. Clayton, for acute c/o rash/mole to left breast area. Endorses sharp and severe pain yesterday. Denies pain today except with palpation. Was recently at the beach. Denies any fever, drainage, mass. Was concerned for possible malignant mole and wanted it checked out. Denies pain today.       Review of Systems   Integumentary:  Positive for mole/lesion.   All other systems reviewed and are negative.    Past Medical History:   Diagnosis Date    Anxiety     Aortic atherosclerosis     Noted on imaging    Cataract     Chronic constipation     DDD (degenerative disc disease), lumbar     Fibroid tumor     Glaucoma     Hyperplastic colon polyp     Osteoarthritis of left knee     Osteopenia     Pes planus        Past Surgical History:   Procedure Laterality Date    BREAST CYST EXCISION      Breast reduction  2004    COLONOSCOPY N/A 10/10/2018    Procedure: COLONOSCOPY;  Surgeon: Cristal Whiteside MD;  Location: [facility] ENDO;  Service: Endoscopy;  Laterality: N/A;    COLONOSCOPY N/A 12/05/2023    Procedure: COLONOSCOPY;  Surgeon: Fernandez Little MD;  Location: [facility] ENDO;  Service: Endoscopy;  Laterality: N/A;    PERCUTANEOUS CRYOTHERAPY OF PERIPHERAL NERVE USING LIQUID NITROUS OXIDE IN CLOSED NEEDLE DEVICE Left 08/23/2018    Procedure: CRYOTHERAPY, NERVE, PERIPHERAL, PERCUTANEOUS, USING LIQUID NITROUS OXIDE IN CLOSED NEEDLE DEVICE;  Surgeon: FRANK Null;  Location: [facility] OR;  Service: Orthopedics;  Laterality: Left;    TOTAL KNEE ARTHROPLASTY Left     TOTAL REDUCTION MAMMOPLASTY      TUBAL LIGATION         Family History   Problem Relation Name Age of Onset    Diabetes Mother      No Known Problems Father      Diabetes Sister [place]         x2    Breast  cancer Sister [place]     No Known Problems Sister Gracy     No Known Problems Sister Quita Ruby     Hypertension Brother Mehran     Seizures Brother Sean     Allergies Brother Dequan     No Known Problems Brother Ashok     No Known Problems Brother Johan     Stroke Brother Louis     Depression Daughter Cherie     Anxiety disorder Daughter Cherie     No Known Problems Daughter Jinny     Anxiety disorder Son      Breast cancer Maternal Aunt      No Known Problems Maternal Uncle      No Known Problems Paternal Aunt      No Known Problems Paternal Uncle      No Known Problems Maternal Grandmother      No Known Problems Maternal Grandfather      No Known Problems Paternal Grandmother      No Known Problems Paternal Grandfather      Colon cancer Cousin      Amblyopia Neg Hx      Blindness Neg Hx      Cancer Neg Hx      Cataracts Neg Hx      Glaucoma Neg Hx      Macular degeneration Neg Hx      Retinal detachment Neg Hx      Strabismus Neg Hx      Thyroid disease Neg Hx      Melanoma Neg Hx         Social History     Socioeconomic History    Marital status:    Tobacco Use    Smoking status: Never    Smokeless tobacco: Never   Substance and Sexual Activity    Alcohol use: No     Comment: yearly on occasions    Drug use: No    Sexual activity: Yes     Partners: Male     Birth control/protection: Post-menopausal     Comment:      Social Drivers of Health     Financial Resource Strain: Low Risk  (4/29/2025)    Overall Financial Resource Strain (CARDIA)     Difficulty of Paying Living Expenses: Not hard at all   Food Insecurity: No Food Insecurity (4/29/2025)    Hunger Vital Sign     Worried About Running Out of Food in the Last Year: Never true     Ran Out of Food in the Last Year: Never true   Transportation Needs: No Transportation Needs (4/29/2025)    PRAPARE - Transportation     Lack of Transportation (Medical): No     Lack of Transportation (Non-Medical): No   Physical Activity: Sufficiently Active  "(4/29/2025)    Exercise Vital Sign     Days of Exercise per Week: 4 days     Minutes of Exercise per Session: 90 min   Stress: No Stress Concern Present (4/29/2025)    [employer] - Occupational Stress Questionnaire     Feeling of Stress : Not at all   Housing Stability: Low Risk  (4/29/2025)    Housing Stability Vital Sign     Unable to Pay for Housing in the Last Year: No     Number of Times Moved in the Last Year: 0     Homeless in the Last Year: No       Current Medications[1]    Review of patient's allergies indicates:   Allergen Reactions    Benadryl [diphenhydramine hcl]      Patient states she was in her 20's and had some type of reaction but does not remember exactly what it was.           Objective     Vitals:    05/20/25 1006   BP: 134/74   Pulse: 99   TempSrc: Oral   Height: 5' 6" (1.676 m)   PainSc: 0-No pain   PainLoc: Breast      Physical Exam  Vitals and nursing note reviewed.   Constitutional:       General: She is not in acute distress.     Appearance: Normal appearance. She is not ill-appearing.   HENT:      Head: Normocephalic and atraumatic.      Mouth/Throat:      Mouth: Mucous membranes are moist.      Pharynx: Oropharynx is clear.   Eyes:      General: No scleral icterus.        Right eye: No discharge.         Left eye: No discharge.      Extraocular Movements: Extraocular movements intact.      Conjunctiva/sclera: Conjunctivae normal.   Cardiovascular:      Rate and Rhythm: Normal rate.   Pulmonary:      Effort: Pulmonary effort is normal. No respiratory distress.   Musculoskeletal:      Cervical back: Normal range of motion.   Skin:     General: Skin is warm and dry.      Findings: Lesion and rash present.             Comments: Lesion TTP; denies drainage, itching, burning.    Neurological:      General: No focal deficit present.      Mental Status: She is alert and oriented to person, place, and time.      GCS: GCS eye subscore is 4. GCS verbal subscore is 5. " GCS motor subscore is 6.   Psychiatric:         Mood and Affect: Mood normal.         Speech: Speech normal.         Behavior: Behavior normal. Behavior is cooperative.         Cognition and Memory: Cognition normal.              Assessment and Plan     1. Folliculitis  2. Skin lesion  - New problem; small rash noted to left upper abdominal region. Denies itching or drainage but endorses TTP. Rash/lesion looks to be a possible folliculitis or herpetic lesion. Patient had shingles vaccines with no prior history of shingles. Will try doxycycline BID for 5 days; if no improvement patient agreeable to follow up and possibly trial valtrex if lesions multiply or worsen. Apply ointment to site to help with inflammation/itching.   -     doxycycline (VIBRAMYCIN) 100 MG Cap; Take 1 capsule (100 mg total) by mouth every 12 (twelve) hours. for 5 days  Dispense: 10 capsule; Refill: 0  -     triamcinolone acetonide 0.1% (KENALOG) 0.1 % ointment; Apply topically 2 (two) times daily. for 7 days  Dispense: 15 g; Refill: 0        ER/Urgent Care precautions discussed with patient. Go to ER for new or worsening symptoms.           Follow up in about 2 weeks (around 6/3/2025), or if symptoms worsen or fail to improve.    42 minutes of total time spent on the encounter, which includes face to face time and non-face to face time preparing to see the patient (eg, review of tests), Obtaining and/or reviewing separately obtained history, Documenting clinical information in the electronic or other health record, Independently interpreting results (not separately reported) and communicating results to the patient/family/caregiver, or Care coordination (not separately reported).      Carline Parry, MSN, APRN, FNP-C  Family Medicine  Office #[phone]          [1]   Current Outpatient Medications   Medication Sig Dispense Refill    ALPRAZolam (XANAX) 0.5 MG tablet Take 1 tablet (0.5 mg total) by mouth daily as needed. 30 tablet 0     betamethasone dipropionate 0.05 % cream AAA ant neck bid x 3 months 45 g 1    clindamycin (CLEOCIN) 150 MG capsule Take 300 mg by mouth as needed. Prior to dental work      clotrimazole-betamethasone 1-0.05% (LOTRISONE) cream Apply topically 2 (two) times daily as needed (vulvar itching). 45 g 4    dorzolamide-timolol 2-0.5% (COSOPT) 22.3-6.8 mg/mL ophthalmic solution INSTILL 1 DROP INTO BOTH EYES TWICE A DAY 30 mL 3    fluticasone propionate (FLONASE) 50 mcg/actuation nasal spray 2 sprays (100 mcg total) by Each Nostril route once daily. 16 g 11    ketotifen (ZADITOR) 0.025 % (0.035 %) ophthalmic solution PLACE 2 DROPS INTO BOTH EYES 2 (TWO) TIMES DAILY. 15 mL 1    meloxicam (MOBIC) 7.5 MG tablet TAKE 1 TABLET BY MOUTH EVERY DAY AS NEEDED 30 tablet 2    nystatin (MYCOSTATIN) powder Apply topically as needed. 60 g 1    pimecrolimus (ELIDEL) 1 % cream AAA ant neck bid 30 g 3    rosuvastatin (CRESTOR) 10 MG tablet Take 1 tablet (10 mg total) by mouth once daily. 90 tablet 1    doxycycline (VIBRAMYCIN) 100 MG Cap Take 1 capsule (100 mg total) by mouth every 12 (twelve) hours. for 5 days 10 capsule 0    triamcinolone acetonide 0.1% (KENALOG) 0.1 % ointment Apply topically 2 (two) times daily. for 7 days 15 g 0     No current facility-administered medications for this visit.

## 2025-05-22 RX ORDER — ROSUVASTATIN CALCIUM 10 MG/1
10 TABLET, COATED ORAL
Qty: 90 TABLET | Refills: 1 | Status: SHIPPED | OUTPATIENT
Start: 2025-05-22

## 2025-05-22 NOTE — TELEPHONE ENCOUNTER
Refill Decision Note   Huber Pena  is requesting a refill authorization.  Brief Assessment and Rationale for Refill:  Approve     Medication Therapy Plan:        Comments:     Note composed:4:46 PM 05/22/2025

## 2025-05-22 NOTE — TELEPHONE ENCOUNTER
No care due was identified.  [facility] Embedded Care Due Messages. Reference number: [id number].   5/22/2025 4:45:54 PM CDT

## 2025-06-30 ENCOUNTER — RESULTS FOLLOW-UP (OUTPATIENT)
Dept: FAMILY MEDICINE | Facility: CLINIC | Age: 75
End: 2025-06-30

## 2025-06-30 ENCOUNTER — LAB VISIT (OUTPATIENT)
Dept: LAB | Facility: HOSPITAL | Age: 75
End: 2025-06-30
Attending: FAMILY MEDICINE
Payer: MEDICARE

## 2025-06-30 ENCOUNTER — OFFICE VISIT (OUTPATIENT)
Dept: FAMILY MEDICINE | Facility: CLINIC | Age: 75
End: 2025-06-30
Payer: MEDICARE

## 2025-06-30 VITALS
HEART RATE: 60 BPM | TEMPERATURE: 98 F | DIASTOLIC BLOOD PRESSURE: 80 MMHG | OXYGEN SATURATION: 97 % | SYSTOLIC BLOOD PRESSURE: 108 MMHG | BODY MASS INDEX: 28.74 KG/M2 | WEIGHT: 178.81 LBS | HEIGHT: 66 IN

## 2025-06-30 DIAGNOSIS — M85.80 OSTEOPENIA, UNSPECIFIED LOCATION: ICD-10-CM

## 2025-06-30 DIAGNOSIS — M79.671 RIGHT FOOT PAIN: ICD-10-CM

## 2025-06-30 DIAGNOSIS — M85.88 OTHER SPECIFIED DISORDERS OF BONE DENSITY AND STRUCTURE, OTHER SITE: ICD-10-CM

## 2025-06-30 DIAGNOSIS — I70.0 AORTIC ATHEROSCLEROSIS: ICD-10-CM

## 2025-06-30 DIAGNOSIS — Z79.899 OTHER LONG TERM (CURRENT) DRUG THERAPY: ICD-10-CM

## 2025-06-30 DIAGNOSIS — S86.912A STRAIN OF LEFT KNEE AND LEG, INITIAL ENCOUNTER: ICD-10-CM

## 2025-06-30 DIAGNOSIS — F41.9 ANXIETY: ICD-10-CM

## 2025-06-30 DIAGNOSIS — I70.0 AORTIC ATHEROSCLEROSIS: Primary | ICD-10-CM

## 2025-06-30 LAB
ABSOLUTE EOSINOPHIL (OHS): 0.08 K/UL
ABSOLUTE MONOCYTE (OHS): 0.52 K/UL (ref 0.3–1)
ABSOLUTE NEUTROPHIL COUNT (OHS): 3.21 K/UL (ref 1.8–7.7)
ALBUMIN SERPL BCP-MCNC: 3.8 G/DL (ref 3.5–5.2)
ALP SERPL-CCNC: 83 UNIT/L (ref 40–150)
ALT SERPL W/O P-5'-P-CCNC: 39 UNIT/L (ref 10–44)
ANION GAP (OHS): 6 MMOL/L (ref 8–16)
AST SERPL-CCNC: 32 UNIT/L (ref 11–45)
BASOPHILS # BLD AUTO: 0.05 K/UL
BASOPHILS NFR BLD AUTO: 0.9 %
BILIRUB SERPL-MCNC: 0.7 MG/DL (ref 0.1–1)
BUN SERPL-MCNC: 11 MG/DL (ref 8–23)
CALCIUM SERPL-MCNC: 9.3 MG/DL (ref 8.7–10.5)
CHLORIDE SERPL-SCNC: 109 MMOL/L (ref 95–110)
CHOLEST SERPL-MCNC: 146 MG/DL (ref 120–199)
CHOLEST/HDLC SERPL: 1.9 {RATIO} (ref 2–5)
CO2 SERPL-SCNC: 23 MMOL/L (ref 23–29)
CREAT SERPL-MCNC: 0.8 MG/DL (ref 0.5–1.4)
EAG (OHS): 117 MG/DL (ref 68–131)
ERYTHROCYTE [DISTWIDTH] IN BLOOD BY AUTOMATED COUNT: 13.4 % (ref 11.5–14.5)
GFR SERPLBLD CREATININE-BSD FMLA CKD-EPI: >60 ML/MIN/1.73/M2
GLUCOSE SERPL-MCNC: 89 MG/DL (ref 70–110)
HBA1C MFR BLD: 5.7 % (ref 4–5.6)
HCT VFR BLD AUTO: 41.9 % (ref 37–48.5)
HDLC SERPL-MCNC: 75 MG/DL (ref 40–75)
HDLC SERPL: 51.4 % (ref 20–50)
HGB BLD-MCNC: 13.8 GM/DL (ref 12–16)
IMM GRANULOCYTES # BLD AUTO: 0.02 K/UL (ref 0–0.04)
IMM GRANULOCYTES NFR BLD AUTO: 0.4 % (ref 0–0.5)
LDLC SERPL CALC-MCNC: 61 MG/DL (ref 63–159)
LYMPHOCYTES # BLD AUTO: 1.62 K/UL (ref 1–4.8)
MCH RBC QN AUTO: 31.7 PG (ref 27–31)
MCHC RBC AUTO-ENTMCNC: 32.9 G/DL (ref 32–36)
MCV RBC AUTO: 96 FL (ref 82–98)
NONHDLC SERPL-MCNC: 71 MG/DL
NUCLEATED RBC (/100WBC) (OHS): 0 /100 WBC
PLATELET # BLD AUTO: 168 K/UL (ref 150–450)
PMV BLD AUTO: 10.6 FL (ref 9.2–12.9)
POTASSIUM SERPL-SCNC: 4.2 MMOL/L (ref 3.5–5.1)
PROT SERPL-MCNC: 7.8 GM/DL (ref 6–8.4)
RBC # BLD AUTO: 4.35 M/UL (ref 4–5.4)
RELATIVE EOSINOPHIL (OHS): 1.5 %
RELATIVE LYMPHOCYTE (OHS): 29.5 % (ref 18–48)
RELATIVE MONOCYTE (OHS): 9.5 % (ref 4–15)
RELATIVE NEUTROPHIL (OHS): 58.2 % (ref 38–73)
SODIUM SERPL-SCNC: 138 MMOL/L (ref 136–145)
TRIGL SERPL-MCNC: 50 MG/DL (ref 30–150)
WBC # BLD AUTO: 5.5 K/UL (ref 3.9–12.7)

## 2025-06-30 PROCEDURE — G2211 COMPLEX E/M VISIT ADD ON: HCPCS | Mod: S$GLB,,, | Performed by: FAMILY MEDICINE

## 2025-06-30 PROCEDURE — 99214 OFFICE O/P EST MOD 30 MIN: CPT | Mod: S$GLB,,, | Performed by: FAMILY MEDICINE

## 2025-06-30 PROCEDURE — 83036 HEMOGLOBIN GLYCOSYLATED A1C: CPT

## 2025-06-30 PROCEDURE — 3288F FALL RISK ASSESSMENT DOCD: CPT | Mod: CPTII,S$GLB,, | Performed by: FAMILY MEDICINE

## 2025-06-30 PROCEDURE — 36415 COLL VENOUS BLD VENIPUNCTURE: CPT

## 2025-06-30 PROCEDURE — 1101F PT FALLS ASSESS-DOCD LE1/YR: CPT | Mod: CPTII,S$GLB,, | Performed by: FAMILY MEDICINE

## 2025-06-30 PROCEDURE — 84478 ASSAY OF TRIGLYCERIDES: CPT

## 2025-06-30 PROCEDURE — 1159F MED LIST DOCD IN RCRD: CPT | Mod: CPTII,S$GLB,, | Performed by: FAMILY MEDICINE

## 2025-06-30 PROCEDURE — 85025 COMPLETE CBC W/AUTO DIFF WBC: CPT

## 2025-06-30 PROCEDURE — 3079F DIAST BP 80-89 MM HG: CPT | Mod: CPTII,S$GLB,, | Performed by: FAMILY MEDICINE

## 2025-06-30 PROCEDURE — 1125F AMNT PAIN NOTED PAIN PRSNT: CPT | Mod: CPTII,S$GLB,, | Performed by: FAMILY MEDICINE

## 2025-06-30 PROCEDURE — 3074F SYST BP LT 130 MM HG: CPT | Mod: CPTII,S$GLB,, | Performed by: FAMILY MEDICINE

## 2025-06-30 PROCEDURE — 82040 ASSAY OF SERUM ALBUMIN: CPT

## 2025-06-30 PROCEDURE — 99999 PR PBB SHADOW E&M-EST. PATIENT-LVL IV: CPT | Mod: PBBFAC,,, | Performed by: FAMILY MEDICINE

## 2025-06-30 RX ORDER — MELOXICAM 7.5 MG/1
TABLET ORAL
Qty: 30 TABLET | Refills: 3 | Status: SHIPPED | OUTPATIENT
Start: 2025-06-30

## 2025-06-30 RX ORDER — ALPRAZOLAM 0.5 MG/1
0.5 TABLET ORAL DAILY PRN
Qty: 30 TABLET | Refills: 0 | Status: SHIPPED | OUTPATIENT
Start: 2025-06-30

## 2025-06-30 NOTE — PROGRESS NOTES
(Portions of this note were dictated using voice recognition software and may contain dictation related errors in spelling/grammar/syntax not found on text review)    CC:   Chief Complaint   Patient presents with    Follow-up    Hyperlipidemia    Leg Pain       HPI: 75 y.o. female     Aortic atherosclerosis noted on prior imaging currently on Crestor 10 mg daily    Had some prior issues with chest discomfort for which she narrowed down to a homemade chocolate drink  with some dark chocolate that she figured  could have exacerbated some of her symptoms.   Prior during workup for initial symptoms she was ordered a stress echo test but insurance company denied it.  She did end up having a cardiac CTA and calcium score is 3 which put her in the 3rd percentile.  No intervention other than her statin.    History of osteopenia but last DEXA in 2023 was normal      Anxiety, on occasional alprazolam,  .        Left knee osteoarthritis, status post TKA in August 2018.  Overall doing well but does occasionally use meloxicam.  Last left knee x-ray noted 2024, stable hardware, no new lucencies or periprosthetic fracture.  Last ortho visit was October of 2024    Right foot pain for the last week.  Does exercises at the gym.  Feels like she probably flared up some arthritis.  She has known arthritis in the right foot, has a bunion as well.  Use a Spenco arch supports.  Will use an OTC pain cream which helps.  Last couple of days she has had to use Tylenol in addition which also helps.  No specific injury.    Exercises regularly at the gym     Does admit to over indulge in sweets         Past Medical History:   Diagnosis Date    Anxiety     Aortic atherosclerosis     Noted on imaging    Cataract     Chronic constipation     DDD (degenerative disc disease), lumbar     Fibroid tumor     Glaucoma     Hyperplastic colon polyp     Osteoarthritis of left knee     Osteopenia     Pes planus        Past Surgical History:   Procedure  Laterality Date    BREAST CYST EXCISION      Breast reduction  2004    COLONOSCOPY N/A 10/10/2018    Procedure: COLONOSCOPY;  Surgeon: Cristal Whiteside MD;  Location: [facility] ENDO;  Service: Endoscopy;  Laterality: N/A;    COLONOSCOPY N/A 12/05/2023    Procedure: COLONOSCOPY;  Surgeon: Fernandez Little MD;  Location: [facility] ENDO;  Service: Endoscopy;  Laterality: N/A;    PERCUTANEOUS CRYOTHERAPY OF PERIPHERAL NERVE USING LIQUID NITROUS OXIDE IN CLOSED NEEDLE DEVICE Left 08/23/2018    Procedure: CRYOTHERAPY, NERVE, PERIPHERAL, PERCUTANEOUS, USING LIQUID NITROUS OXIDE IN CLOSED NEEDLE DEVICE;  Surgeon: FRANK Null;  Location: [facility] OR;  Service: Orthopedics;  Laterality: Left;    TOTAL KNEE ARTHROPLASTY Left     TOTAL REDUCTION MAMMOPLASTY      TUBAL LIGATION         Family History   Problem Relation Name Age of Onset    Diabetes Mother      No Known Problems Father      Diabetes Sister [place]         x2    Breast cancer Sister [place]     No Known Problems Sister Gracy     No Known Problems Sister Quita Tung     Hypertension Brother Laurent     Seizures Brother Sean     Allergies Brother Dequan     No Known Problems Brother Ashok     No Known Problems Brother Johan     Stroke Brother Louis     Depression Daughter Cherie     Anxiety disorder Daughter Cherie     No Known Problems Daughter Jinny     Anxiety disorder Son      Breast cancer Maternal Aunt      No Known Problems Maternal Uncle      No Known Problems Paternal Aunt      No Known Problems Paternal Uncle      No Known Problems Maternal Grandmother      No Known Problems Maternal Grandfather      No Known Problems Paternal Grandmother      No Known Problems Paternal Grandfather      Colon cancer Cousin      Amblyopia Neg Hx      Blindness Neg Hx      Cancer Neg Hx      Cataracts Neg Hx      Glaucoma Neg Hx      Macular degeneration Neg Hx      Retinal detachment Neg Hx      Strabismus Neg Hx      Thyroid disease Neg Hx      Melanoma Neg Hx    "      Social History     Tobacco Use    Smoking status: Never    Smokeless tobacco: Never   Substance Use Topics    Alcohol use: No     Comment: yearly on occasions    Drug use: No       Lab Results   Component Value Date    WBC 7.39 12/10/2024    HGB 13.9 12/10/2024    HCT 42.2 12/10/2024    MCV 96 12/10/2024     12/10/2024    CHOL 135 12/10/2024    TRIG 43 12/10/2024    HDL 72 12/10/2024    ALT 21 12/10/2024    AST 21 12/10/2024    BILITOT 0.8 12/10/2024    ALKPHOS 86 12/10/2024     12/10/2024    K 4.3 12/10/2024     12/10/2024    CREATININE 0.8 12/10/2024    ESTGFRAFRICA >60 12/13/2021    EGFRNONAA >60 12/13/2021    EGFRNORACEVR >60 12/10/2024    CALCIUM 9.6 12/10/2024    ALBUMIN 3.8 12/10/2024    BUN 12 12/10/2024    CO2 23 12/10/2024    TSH 0.796 12/10/2024    INR 1.0 07/30/2018    HGBA1C 5.4 12/10/2024    LDLCALC 54.4 (L) 12/10/2024    GLU 79 12/10/2024    [id number] 42 12/10/2024                 Vital signs reviewed  Vitals:    06/30/25 0810   BP: 108/80   BP Location: Left arm   Patient Position: Sitting   Pulse: 60   Temp: 97.7 °F (36.5 °C)   TempSrc: Oral   SpO2: 97%   Weight: 81.1 kg (178 lb 12.7 oz)   Height: 5' 6" (1.676 m)           Wt Readings from Last 4 Encounters:   06/30/25 81.1 kg (178 lb 12.7 oz)   12/13/24 75.8 kg (167 lb 1.7 oz)   10/25/24 77.3 kg (170 lb 8.4 oz)   10/25/24 78 kg (171 lb 15.3 oz)       PE:   APPEARANCE: Well nourished, well developed, in no acute distress.    HEAD: Normocephalic, atraumatic.  CHEST: Good inspiratory effort. Lungs clear to auscultation with no wheezes or crackles.  CARDIOVASCULAR: Normal S1, S2. No rubs, murmurs, or gallops.  ABDOMEN: Bowel sounds normal. Not distended. Soft. No tenderness or masses. No organomegaly.  EXTREMITIES: No edema .  Right foot:  Some dorsal deformity noted chronic likely from osteoarthritis.  Hallux valgus noted.  No signs of foot infection/cellulitis.      IMPRESSION  1. Aortic atherosclerosis    2. Anxiety    3. " Other long term (current) drug therapy    4. Osteopenia, unspecified location    5. Other specified disorders of bone density and structure, other site    6. Strain of left knee and leg, initial encounter                PLAN   Continue crestor 10 mg      Xanax for as needed use .  She manage his stress by exercise and eating healthfully.  She can notify me if anxiety significantly worsening needing further change in therapy     She occasionally takes meloxicam for occasional pains but not often     Foot pain: Continue conservative care, foot and ankle exercises described.  Continue NSAID topical as above, occasional oral, occasional Tylenol if needed.  Notify for any worsening     Continue with regular exercise, plans on slowly increasing her weight resistance to help with muscle mass maintenance.    Osteopenia history, normal DEXA last check.  Will update in August of 2025.  Continues on calcium and vitamin-D supplementation along with weight-bearing exercise     Orders Placed This Encounter   Procedures    DXA Bone Density Axial Skeleton 1 or more sites    CBC Auto Differential    Comprehensive Metabolic Panel    Lipid Panel    Hemoglobin A1C     Medications Ordered This Encounter   Medications    ALPRAZolam (XANAX) 0.5 MG tablet     Sig: Take 1 tablet (0.5 mg total) by mouth daily as needed.     Dispense:  30 tablet     Refill:  0     This request is for a new prescription for a controlled substance as required by Federal/State law..    meloxicam (MOBIC) 7.5 MG tablet     Sig: TAKE 1 TABLET BY MOUTH EVERY DAY AS NEEDED     Dispense:  30 tablet     Refill:  3         SCREENINGS    Immunizations:  tdap 2025  Flu: up-to-date  Pneumococcal vaccination: [id number] 2018. PVX 2019  COVID  booster up-to-date  Zoster up-to-date    Age/Gender Appropriate screenings  Mmg 12/2024,    dexa 08/21/2023: normal, update in August of 2025  Colonoscopy 12/05/2023: Normal

## 2025-08-21 ENCOUNTER — HOSPITAL ENCOUNTER (OUTPATIENT)
Dept: RADIOLOGY | Facility: HOSPITAL | Age: 75
Discharge: HOME | End: 2025-08-21
Attending: FAMILY MEDICINE
Payer: MEDICARE

## 2025-08-21 DIAGNOSIS — M85.88 OTHER SPECIFIED DISORDERS OF BONE DENSITY AND STRUCTURE, OTHER SITE: ICD-10-CM

## 2025-08-21 DIAGNOSIS — M85.80 OSTEOPENIA, UNSPECIFIED LOCATION: ICD-10-CM

## 2025-08-21 PROCEDURE — 77080 DXA BONE DENSITY AXIAL: CPT | Mod: 26,,, | Performed by: RADIOLOGY

## 2025-08-21 PROCEDURE — 77080 DXA BONE DENSITY AXIAL: CPT | Mod: TC

## 2025-08-26 ENCOUNTER — CLINICAL SUPPORT (OUTPATIENT)
Dept: OPHTHALMOLOGY | Facility: CLINIC | Age: 75
End: 2025-08-26
Payer: MEDICARE

## 2025-08-26 ENCOUNTER — OFFICE VISIT (OUTPATIENT)
Dept: OPHTHALMOLOGY | Facility: CLINIC | Age: 75
End: 2025-08-26
Payer: MEDICARE

## 2025-08-26 DIAGNOSIS — H25.13 NUCLEAR SCLEROTIC CATARACT OF BOTH EYES: ICD-10-CM

## 2025-08-26 DIAGNOSIS — H52.4 HYPEROPIA WITH PRESBYOPIA OF BOTH EYES: ICD-10-CM

## 2025-08-26 DIAGNOSIS — H40.023 OPEN ANGLE WITH BORDERLINE FINDINGS AND HIGH GLAUCOMA RISK IN BOTH EYES: Primary | ICD-10-CM

## 2025-08-26 DIAGNOSIS — H52.03 HYPEROPIA WITH PRESBYOPIA OF BOTH EYES: ICD-10-CM

## 2025-08-26 PROCEDURE — 1126F AMNT PAIN NOTED NONE PRSNT: CPT | Mod: CPTII,S$GLB,, | Performed by: OPHTHALMOLOGY

## 2025-08-26 PROCEDURE — 92083 EXTENDED VISUAL FIELD XM: CPT | Mod: S$GLB,,, | Performed by: OPHTHALMOLOGY

## 2025-08-26 PROCEDURE — 3288F FALL RISK ASSESSMENT DOCD: CPT | Mod: CPTII,S$GLB,, | Performed by: OPHTHALMOLOGY

## 2025-08-26 PROCEDURE — 99999 PR PBB SHADOW E&M-EST. PATIENT-LVL III: CPT | Mod: PBBFAC,,, | Performed by: OPHTHALMOLOGY

## 2025-08-26 PROCEDURE — 1160F RVW MEDS BY RX/DR IN RCRD: CPT | Mod: CPTII,S$GLB,, | Performed by: OPHTHALMOLOGY

## 2025-08-26 PROCEDURE — 92020 GONIOSCOPY: CPT | Mod: S$GLB,,, | Performed by: OPHTHALMOLOGY

## 2025-08-26 PROCEDURE — 99214 OFFICE O/P EST MOD 30 MIN: CPT | Mod: S$GLB,,, | Performed by: OPHTHALMOLOGY

## 2025-08-26 PROCEDURE — 1101F PT FALLS ASSESS-DOCD LE1/YR: CPT | Mod: CPTII,S$GLB,, | Performed by: OPHTHALMOLOGY

## 2025-08-26 PROCEDURE — 92133 CPTRZD OPH DX IMG PST SGM ON: CPT | Mod: S$GLB,,, | Performed by: OPHTHALMOLOGY

## 2025-08-26 PROCEDURE — 1159F MED LIST DOCD IN RCRD: CPT | Mod: CPTII,S$GLB,, | Performed by: OPHTHALMOLOGY

## (undated) DEVICE — PAD PREP 50/CA

## (undated) DEVICE — CARD UNIV KNEE NAVGTN SW-SCL L

## (undated) DEVICE — SEE MEDLINE ITEM 157125

## (undated) DEVICE — SUT VICRYL 1 OB 36 CTX

## (undated) DEVICE — DURAPREP SURG SCRUB 26ML

## (undated) DEVICE — DRAPE TIBURON ORTHOPEDIC SPLIT

## (undated) DEVICE — Device

## (undated) DEVICE — PIN PINABALL HEADLESS
Type: IMPLANTABLE DEVICE | Site: KNEE | Status: NON-FUNCTIONAL
Removed: 2018-08-27

## (undated) DEVICE — PULSAVAC ZIMMER

## (undated) DEVICE — SUT CTD VICRYL 2.0

## (undated) DEVICE — GAUZE SPONGE 4X4 12PLY

## (undated) DEVICE — COVER OVERHEAD SURG LT BLUE

## (undated) DEVICE — GLOVE 8 PROTEXIS PI BLUE

## (undated) DEVICE — ADAPTER DUAL IRRIGATION

## (undated) DEVICE — MANIFOLD 4 PORT

## (undated) DEVICE — SEE MEDLINE ITEM 153151

## (undated) DEVICE — HOOD T-5 TEAR AWAY STERILE

## (undated) DEVICE — NOZZLE BNE INJ CEM FEM POST 65

## (undated) DEVICE — ALCOHOL 70% ISOP W/GREEN 16OZ

## (undated) DEVICE — ADHESIVE DERMABOND ADVANCED

## (undated) DEVICE — ELECTRODE REM PLYHSV RETURN 9

## (undated) DEVICE — CONTAINER SPECIMEN STR 3 0Z

## (undated) DEVICE — SOL IRR NACL .9% 3000ML

## (undated) DEVICE — BATTERY INSTRUMENT

## (undated) DEVICE — DRESSING COVER AQUACEL AG SURG

## (undated) DEVICE — NDL ECLIPSE SAFETY 18GX1-1/2IN

## (undated) DEVICE — DRAPE CASSETTE 20 X 40

## (undated) DEVICE — BLADE RMR PATELLA 35MM

## (undated) DEVICE — KIT MX BNE CEM REVOLTN W/BRKWY

## (undated) DEVICE — DRAPE INCISE IOBAN 2 23X23IN

## (undated) DEVICE — SUT STRATAFIX PDS 1 CTX 18IN

## (undated) DEVICE — SYR 10CC LUER LOCK

## (undated) DEVICE — GLOVE 7.5 PROTEXIS PI ORTHO PF

## (undated) DEVICE — SCRUB 10% POVIDONE IODINE 4OZ

## (undated) DEVICE — CLOSURE SKIN STERI STRIP 1/2X4

## (undated) DEVICE — SYR ONLY LUER LOCK 20CC